# Patient Record
Sex: FEMALE | Race: WHITE | Employment: FULL TIME | URBAN - METROPOLITAN AREA
[De-identification: names, ages, dates, MRNs, and addresses within clinical notes are randomized per-mention and may not be internally consistent; named-entity substitution may affect disease eponyms.]

---

## 2017-02-16 ENCOUNTER — TELEPHONE (OUTPATIENT)
Dept: ADMINISTRATIVE | Age: 67
End: 2017-02-16

## 2017-02-16 DIAGNOSIS — R73.9 HYPERGLYCEMIA: ICD-10-CM

## 2017-02-16 DIAGNOSIS — E78.5 HYPERLIPIDEMIA, UNSPECIFIED HYPERLIPIDEMIA TYPE: ICD-10-CM

## 2017-02-16 DIAGNOSIS — I10 ESSENTIAL HYPERTENSION: Primary | ICD-10-CM

## 2017-02-16 RX ORDER — LISINOPRIL 40 MG/1
40 TABLET ORAL DAILY
Qty: 30 TABLET | Refills: 0 | Status: SHIPPED | OUTPATIENT
Start: 2017-02-16 | End: 2017-03-08 | Stop reason: SDUPTHER

## 2017-02-16 RX ORDER — HYDROCHLOROTHIAZIDE 12.5 MG/1
12.5 TABLET ORAL DAILY
Qty: 30 TABLET | Refills: 0 | Status: SHIPPED | OUTPATIENT
Start: 2017-02-16 | End: 2017-03-08 | Stop reason: SDUPTHER

## 2017-03-08 ENCOUNTER — OFFICE VISIT (OUTPATIENT)
Dept: FAMILY MEDICINE CLINIC | Age: 67
End: 2017-03-08

## 2017-03-08 VITALS
HEART RATE: 102 BPM | WEIGHT: 175 LBS | DIASTOLIC BLOOD PRESSURE: 78 MMHG | OXYGEN SATURATION: 97 % | BODY MASS INDEX: 28.12 KG/M2 | SYSTOLIC BLOOD PRESSURE: 104 MMHG | HEIGHT: 66 IN

## 2017-03-08 DIAGNOSIS — R73.9 HYPERGLYCEMIA: ICD-10-CM

## 2017-03-08 DIAGNOSIS — I10 ESSENTIAL HYPERTENSION: Primary | ICD-10-CM

## 2017-03-08 DIAGNOSIS — L57.0 AK (ACTINIC KERATOSIS): ICD-10-CM

## 2017-03-08 PROCEDURE — G0009 ADMIN PNEUMOCOCCAL VACCINE: HCPCS | Performed by: FAMILY MEDICINE

## 2017-03-08 PROCEDURE — 17003 DESTRUCT PREMALG LES 2-14: CPT | Performed by: FAMILY MEDICINE

## 2017-03-08 PROCEDURE — G8420 CALC BMI NORM PARAMETERS: HCPCS | Performed by: FAMILY MEDICINE

## 2017-03-08 PROCEDURE — G8399 PT W/DXA RESULTS DOCUMENT: HCPCS | Performed by: FAMILY MEDICINE

## 2017-03-08 PROCEDURE — 90732 PPSV23 VACC 2 YRS+ SUBQ/IM: CPT | Performed by: FAMILY MEDICINE

## 2017-03-08 PROCEDURE — G8484 FLU IMMUNIZE NO ADMIN: HCPCS | Performed by: FAMILY MEDICINE

## 2017-03-08 PROCEDURE — 3014F SCREEN MAMMO DOC REV: CPT | Performed by: FAMILY MEDICINE

## 2017-03-08 PROCEDURE — 1090F PRES/ABSN URINE INCON ASSESS: CPT | Performed by: FAMILY MEDICINE

## 2017-03-08 PROCEDURE — 3017F COLORECTAL CA SCREEN DOC REV: CPT | Performed by: FAMILY MEDICINE

## 2017-03-08 PROCEDURE — 1123F ACP DISCUSS/DSCN MKR DOCD: CPT | Performed by: FAMILY MEDICINE

## 2017-03-08 PROCEDURE — 17000 DESTRUCT PREMALG LESION: CPT | Performed by: FAMILY MEDICINE

## 2017-03-08 PROCEDURE — 99213 OFFICE O/P EST LOW 20 MIN: CPT | Performed by: FAMILY MEDICINE

## 2017-03-08 PROCEDURE — 4040F PNEUMOC VAC/ADMIN/RCVD: CPT | Performed by: FAMILY MEDICINE

## 2017-03-08 PROCEDURE — 1036F TOBACCO NON-USER: CPT | Performed by: FAMILY MEDICINE

## 2017-03-08 PROCEDURE — G8427 DOCREV CUR MEDS BY ELIG CLIN: HCPCS | Performed by: FAMILY MEDICINE

## 2017-03-08 RX ORDER — LISINOPRIL 40 MG/1
40 TABLET ORAL DAILY
Qty: 90 TABLET | Refills: 3 | Status: SHIPPED | OUTPATIENT
Start: 2017-03-08 | End: 2018-02-22 | Stop reason: SDUPTHER

## 2017-03-08 RX ORDER — HYDROCHLOROTHIAZIDE 12.5 MG/1
12.5 TABLET ORAL DAILY
Qty: 90 TABLET | Refills: 3 | Status: SHIPPED | OUTPATIENT
Start: 2017-03-08 | End: 2018-02-22 | Stop reason: SDUPTHER

## 2017-03-14 ENCOUNTER — OFFICE VISIT (OUTPATIENT)
Dept: FAMILY MEDICINE CLINIC | Age: 67
End: 2017-03-14

## 2017-03-14 VITALS
SYSTOLIC BLOOD PRESSURE: 110 MMHG | DIASTOLIC BLOOD PRESSURE: 82 MMHG | BODY MASS INDEX: 28.4 KG/M2 | HEART RATE: 82 BPM | TEMPERATURE: 97.2 F | WEIGHT: 174.6 LBS | OXYGEN SATURATION: 97 %

## 2017-03-14 DIAGNOSIS — J20.9 ACUTE BRONCHITIS, UNSPECIFIED ORGANISM: Primary | ICD-10-CM

## 2017-03-14 PROCEDURE — 4040F PNEUMOC VAC/ADMIN/RCVD: CPT | Performed by: FAMILY MEDICINE

## 2017-03-14 PROCEDURE — G8399 PT W/DXA RESULTS DOCUMENT: HCPCS | Performed by: FAMILY MEDICINE

## 2017-03-14 PROCEDURE — 1090F PRES/ABSN URINE INCON ASSESS: CPT | Performed by: FAMILY MEDICINE

## 2017-03-14 PROCEDURE — 1036F TOBACCO NON-USER: CPT | Performed by: FAMILY MEDICINE

## 2017-03-14 PROCEDURE — 99213 OFFICE O/P EST LOW 20 MIN: CPT | Performed by: FAMILY MEDICINE

## 2017-03-14 PROCEDURE — 3014F SCREEN MAMMO DOC REV: CPT | Performed by: FAMILY MEDICINE

## 2017-03-14 PROCEDURE — G8484 FLU IMMUNIZE NO ADMIN: HCPCS | Performed by: FAMILY MEDICINE

## 2017-03-14 PROCEDURE — G8420 CALC BMI NORM PARAMETERS: HCPCS | Performed by: FAMILY MEDICINE

## 2017-03-14 PROCEDURE — 1123F ACP DISCUSS/DSCN MKR DOCD: CPT | Performed by: FAMILY MEDICINE

## 2017-03-14 PROCEDURE — G8428 CUR MEDS NOT DOCUMENT: HCPCS | Performed by: FAMILY MEDICINE

## 2017-03-14 PROCEDURE — 3017F COLORECTAL CA SCREEN DOC REV: CPT | Performed by: FAMILY MEDICINE

## 2017-03-14 RX ORDER — CEFDINIR 300 MG/1
300 CAPSULE ORAL 2 TIMES DAILY
Qty: 20 CAPSULE | Refills: 0 | Status: SHIPPED | OUTPATIENT
Start: 2017-03-14 | End: 2017-03-24

## 2017-03-14 RX ORDER — BENZONATATE 200 MG/1
200 CAPSULE ORAL 3 TIMES DAILY PRN
Qty: 20 CAPSULE | Refills: 0 | Status: SHIPPED | OUTPATIENT
Start: 2017-03-14 | End: 2017-03-21

## 2017-03-14 ASSESSMENT — ENCOUNTER SYMPTOMS: COUGH: 1

## 2017-03-30 RX ORDER — HYDROCHLOROTHIAZIDE 12.5 MG/1
TABLET ORAL
Qty: 30 TABLET | Refills: 0 | OUTPATIENT
Start: 2017-03-30

## 2017-03-30 RX ORDER — LISINOPRIL 40 MG/1
TABLET ORAL
Qty: 30 TABLET | Refills: 0 | OUTPATIENT
Start: 2017-03-30

## 2017-05-02 ENCOUNTER — OFFICE VISIT (OUTPATIENT)
Dept: FAMILY MEDICINE CLINIC | Age: 67
End: 2017-05-02

## 2017-05-02 VITALS
DIASTOLIC BLOOD PRESSURE: 80 MMHG | WEIGHT: 177 LBS | BODY MASS INDEX: 28.79 KG/M2 | TEMPERATURE: 99.4 F | SYSTOLIC BLOOD PRESSURE: 118 MMHG

## 2017-05-02 DIAGNOSIS — R10.30 LOWER ABDOMINAL PAIN: Primary | ICD-10-CM

## 2017-05-02 LAB
BACTERIA URINE, POC: 0
BILIRUBIN URINE: 0 MG/DL
BLOOD, URINE: POSITIVE
CASTS URINE, POC: 0
CLARITY: CLEAR
COLOR: YELLOW
CRYSTALS URINE, POC: 0
EPI CELLS URINE, POC: 0
GLUCOSE URINE: 0
KETONES, URINE: NEGATIVE
LEUKOCYTE EST, POC: NORMAL
NITRITE, URINE: NEGATIVE
PH UA: 5.5 (ref 4.5–8)
PROTEIN UA: NEGATIVE
RBC URINE, POC: 0
SPECIFIC GRAVITY UA: 1.02 (ref 1–1.03)
UROBILINOGEN, URINE: NORMAL
WBC URINE, POC: NORMAL
YEAST URINE, POC: 0

## 2017-05-02 PROCEDURE — G8427 DOCREV CUR MEDS BY ELIG CLIN: HCPCS | Performed by: PHYSICIAN ASSISTANT

## 2017-05-02 PROCEDURE — G8399 PT W/DXA RESULTS DOCUMENT: HCPCS | Performed by: PHYSICIAN ASSISTANT

## 2017-05-02 PROCEDURE — 1036F TOBACCO NON-USER: CPT | Performed by: PHYSICIAN ASSISTANT

## 2017-05-02 PROCEDURE — 99213 OFFICE O/P EST LOW 20 MIN: CPT | Performed by: PHYSICIAN ASSISTANT

## 2017-05-02 PROCEDURE — 1090F PRES/ABSN URINE INCON ASSESS: CPT | Performed by: PHYSICIAN ASSISTANT

## 2017-05-02 PROCEDURE — 3014F SCREEN MAMMO DOC REV: CPT | Performed by: PHYSICIAN ASSISTANT

## 2017-05-02 PROCEDURE — 4040F PNEUMOC VAC/ADMIN/RCVD: CPT | Performed by: PHYSICIAN ASSISTANT

## 2017-05-02 PROCEDURE — 81000 URINALYSIS NONAUTO W/SCOPE: CPT | Performed by: PHYSICIAN ASSISTANT

## 2017-05-02 PROCEDURE — 1123F ACP DISCUSS/DSCN MKR DOCD: CPT | Performed by: PHYSICIAN ASSISTANT

## 2017-05-02 PROCEDURE — 3017F COLORECTAL CA SCREEN DOC REV: CPT | Performed by: PHYSICIAN ASSISTANT

## 2017-05-02 PROCEDURE — G8420 CALC BMI NORM PARAMETERS: HCPCS | Performed by: PHYSICIAN ASSISTANT

## 2017-05-02 RX ORDER — CIPROFLOXACIN 500 MG/1
500 TABLET, FILM COATED ORAL 2 TIMES DAILY
Qty: 10 TABLET | Refills: 0 | Status: SHIPPED | OUTPATIENT
Start: 2017-05-02 | End: 2017-05-05 | Stop reason: SDUPTHER

## 2017-05-02 ASSESSMENT — ENCOUNTER SYMPTOMS
BACK PAIN: 0
CONSTIPATION: 0
NAUSEA: 0
VOMITING: 0
SHORTNESS OF BREATH: 0
DIARRHEA: 0
ABDOMINAL PAIN: 1

## 2017-05-05 DIAGNOSIS — R10.30 LOWER ABDOMINAL PAIN: ICD-10-CM

## 2017-05-05 LAB
ORGANISM: ABNORMAL
URINE CULTURE, ROUTINE: ABNORMAL

## 2017-05-05 RX ORDER — CIPROFLOXACIN 500 MG/1
500 TABLET, FILM COATED ORAL 2 TIMES DAILY
Qty: 10 TABLET | Refills: 0 | Status: SHIPPED | OUTPATIENT
Start: 2017-05-05 | End: 2017-05-10

## 2017-05-09 ENCOUNTER — TELEPHONE (OUTPATIENT)
Dept: FAMILY MEDICINE CLINIC | Age: 67
End: 2017-05-09

## 2017-05-11 ENCOUNTER — TELEPHONE (OUTPATIENT)
Dept: FAMILY MEDICINE CLINIC | Age: 67
End: 2017-05-11

## 2017-05-11 DIAGNOSIS — Z13.9 SCREENING: ICD-10-CM

## 2017-05-11 DIAGNOSIS — R10.30 LOWER ABDOMINAL PAIN: ICD-10-CM

## 2017-05-11 DIAGNOSIS — R50.81 FEVER IN OTHER DISEASES: Primary | ICD-10-CM

## 2017-05-11 DIAGNOSIS — R19.7 DIARRHEA, UNSPECIFIED TYPE: ICD-10-CM

## 2017-05-12 ENCOUNTER — HOSPITAL ENCOUNTER (OUTPATIENT)
Dept: CT IMAGING | Age: 67
Discharge: HOME | End: 2017-05-12
Attending: PHYSICIAN ASSISTANT | Admitting: PHYSICIAN ASSISTANT

## 2017-05-12 DIAGNOSIS — R19.7 DIARRHEA, UNSPECIFIED TYPE: ICD-10-CM

## 2017-05-12 DIAGNOSIS — R50.9 FEVER: ICD-10-CM

## 2017-05-12 DIAGNOSIS — R10.30 LOWER ABDOMINAL PAIN: ICD-10-CM

## 2017-05-12 DIAGNOSIS — R50.81 FEVER IN OTHER DISEASES: ICD-10-CM

## 2017-05-12 LAB
ANION GAP SERPL CALCULATED.3IONS-SCNC: 14 MMOL/L (ref 3–16)
BUN BLDV-MCNC: 22 MG/DL (ref 7–20)
CALCIUM SERPL-MCNC: 9.4 MG/DL (ref 8.3–10.6)
CHLORIDE BLD-SCNC: 100 MMOL/L (ref 99–110)
CO2: 25 MMOL/L (ref 21–32)
CREAT SERPL-MCNC: 1.2 MG/DL (ref 0.6–1.2)
GFR AFRICAN AMERICAN: 54
GFR NON-AFRICAN AMERICAN: 45
GLUCOSE BLD-MCNC: 104 MG/DL (ref 70–99)
POTASSIUM SERPL-SCNC: 4.4 MMOL/L (ref 3.5–5.1)
SODIUM BLD-SCNC: 139 MMOL/L (ref 136–145)

## 2017-05-22 ENCOUNTER — OFFICE VISIT (OUTPATIENT)
Dept: FAMILY MEDICINE CLINIC | Age: 67
End: 2017-05-22

## 2017-05-22 VITALS
HEART RATE: 93 BPM | WEIGHT: 172 LBS | DIASTOLIC BLOOD PRESSURE: 70 MMHG | SYSTOLIC BLOOD PRESSURE: 120 MMHG | BODY MASS INDEX: 27.76 KG/M2 | OXYGEN SATURATION: 99 %

## 2017-05-22 DIAGNOSIS — K57.20 DIVERTICULITIS OF LARGE INTESTINE WITH ABSCESS WITHOUT BLEEDING: Primary | ICD-10-CM

## 2017-05-22 DIAGNOSIS — L20.84 INTRINSIC ECZEMA: ICD-10-CM

## 2017-05-22 DIAGNOSIS — E78.5 HYPERLIPIDEMIA, UNSPECIFIED HYPERLIPIDEMIA TYPE: Primary | ICD-10-CM

## 2017-05-22 DIAGNOSIS — B37.31 MONILIAL VAGINITIS: ICD-10-CM

## 2017-05-22 DIAGNOSIS — E03.9 HYPOTHYROIDISM, UNSPECIFIED TYPE: ICD-10-CM

## 2017-05-22 DIAGNOSIS — I10 ESSENTIAL HYPERTENSION: ICD-10-CM

## 2017-05-22 PROCEDURE — 99213 OFFICE O/P EST LOW 20 MIN: CPT | Performed by: FAMILY MEDICINE

## 2017-05-22 RX ORDER — FLUCONAZOLE 150 MG/1
150 TABLET ORAL ONCE
Qty: 1 TABLET | Refills: 0 | Status: SHIPPED | OUTPATIENT
Start: 2017-05-22 | End: 2017-05-22

## 2017-06-22 ENCOUNTER — HOSPITAL ENCOUNTER (OUTPATIENT)
Dept: CT IMAGING | Age: 67
Discharge: HOME | End: 2017-06-22
Attending: FAMILY MEDICINE | Admitting: FAMILY MEDICINE

## 2017-06-22 DIAGNOSIS — K57.20 DIVERTICULITIS OF LARGE INTESTINE WITH ABSCESS WITHOUT BLEEDING: ICD-10-CM

## 2017-06-22 DIAGNOSIS — K57.20 DIVERTICULITIS OF LARGE INTESTINE WITH PERFORATION AND ABSCESS WITHOUT BLEEDING: ICD-10-CM

## 2017-06-26 ENCOUNTER — NURSE ONLY (OUTPATIENT)
Dept: FAMILY MEDICINE CLINIC | Age: 67
End: 2017-06-26

## 2017-06-26 DIAGNOSIS — N39.0 URINARY TRACT INFECTION WITHOUT HEMATURIA, SITE UNSPECIFIED: Primary | ICD-10-CM

## 2017-06-26 LAB
BACTERIA URINE, POC: NEGATIVE
BILIRUBIN URINE: 0 MG/DL
BLOOD, URINE: NEGATIVE
CASTS URINE, POC: NEGATIVE
CLARITY: CLEAR
COLOR: YELLOW
CRYSTALS URINE, POC: POSITIVE
EPI CELLS URINE, POC: NEGATIVE
GLUCOSE URINE: NEGATIVE
KETONES, URINE: NEGATIVE
LEUKOCYTE EST, POC: NEGATIVE
NITRITE, URINE: NEGATIVE
PH UA: 5 (ref 4.5–8)
PROTEIN UA: NEGATIVE
RBC URINE, POC: NEGATIVE
SPECIFIC GRAVITY UA: 1.02 (ref 1–1.03)
UROBILINOGEN, URINE: NORMAL
WBC URINE, POC: NEGATIVE
YEAST URINE, POC: NEGATIVE

## 2017-06-26 PROCEDURE — 81000 URINALYSIS NONAUTO W/SCOPE: CPT | Performed by: FAMILY MEDICINE

## 2017-09-11 ENCOUNTER — OFFICE VISIT (OUTPATIENT)
Dept: FAMILY MEDICINE CLINIC | Age: 67
End: 2017-09-11

## 2017-09-11 VITALS
SYSTOLIC BLOOD PRESSURE: 110 MMHG | DIASTOLIC BLOOD PRESSURE: 80 MMHG | WEIGHT: 173 LBS | BODY MASS INDEX: 27.92 KG/M2 | TEMPERATURE: 98 F

## 2017-09-11 DIAGNOSIS — L30.9 ECZEMA, UNSPECIFIED TYPE: Primary | ICD-10-CM

## 2017-09-11 PROCEDURE — 3017F COLORECTAL CA SCREEN DOC REV: CPT | Performed by: PHYSICIAN ASSISTANT

## 2017-09-11 PROCEDURE — 1090F PRES/ABSN URINE INCON ASSESS: CPT | Performed by: PHYSICIAN ASSISTANT

## 2017-09-11 PROCEDURE — 1036F TOBACCO NON-USER: CPT | Performed by: PHYSICIAN ASSISTANT

## 2017-09-11 PROCEDURE — G8419 CALC BMI OUT NRM PARAM NOF/U: HCPCS | Performed by: PHYSICIAN ASSISTANT

## 2017-09-11 PROCEDURE — 1123F ACP DISCUSS/DSCN MKR DOCD: CPT | Performed by: PHYSICIAN ASSISTANT

## 2017-09-11 PROCEDURE — 4040F PNEUMOC VAC/ADMIN/RCVD: CPT | Performed by: PHYSICIAN ASSISTANT

## 2017-09-11 PROCEDURE — G8399 PT W/DXA RESULTS DOCUMENT: HCPCS | Performed by: PHYSICIAN ASSISTANT

## 2017-09-11 PROCEDURE — G8427 DOCREV CUR MEDS BY ELIG CLIN: HCPCS | Performed by: PHYSICIAN ASSISTANT

## 2017-09-11 PROCEDURE — 99213 OFFICE O/P EST LOW 20 MIN: CPT | Performed by: PHYSICIAN ASSISTANT

## 2017-09-11 PROCEDURE — 3014F SCREEN MAMMO DOC REV: CPT | Performed by: PHYSICIAN ASSISTANT

## 2017-09-11 RX ORDER — PREDNISONE 10 MG/1
TABLET ORAL
Qty: 45 TABLET | Refills: 0 | Status: SHIPPED | OUTPATIENT
Start: 2017-09-11 | End: 2017-10-09

## 2017-09-11 ASSESSMENT — ENCOUNTER SYMPTOMS
TROUBLE SWALLOWING: 0
SORE THROAT: 0
WHEEZING: 0
SHORTNESS OF BREATH: 0

## 2017-09-13 ENCOUNTER — TELEPHONE (OUTPATIENT)
Dept: FAMILY MEDICINE CLINIC | Age: 67
End: 2017-09-13

## 2017-10-09 ENCOUNTER — OFFICE VISIT (OUTPATIENT)
Dept: FAMILY MEDICINE CLINIC | Age: 67
End: 2017-10-09

## 2017-10-09 VITALS
TEMPERATURE: 97.9 F | WEIGHT: 176 LBS | DIASTOLIC BLOOD PRESSURE: 80 MMHG | BODY MASS INDEX: 28.41 KG/M2 | SYSTOLIC BLOOD PRESSURE: 110 MMHG

## 2017-10-09 DIAGNOSIS — R31.9 URINARY TRACT INFECTION WITH HEMATURIA, SITE UNSPECIFIED: Primary | ICD-10-CM

## 2017-10-09 DIAGNOSIS — R10.32 LLQ ABDOMINAL PAIN: ICD-10-CM

## 2017-10-09 DIAGNOSIS — N39.0 URINARY TRACT INFECTION WITH HEMATURIA, SITE UNSPECIFIED: Primary | ICD-10-CM

## 2017-10-09 LAB
BACTERIA URINE, POC: 0
BILIRUBIN URINE: 0 MG/DL
BLOOD, URINE: POSITIVE
CASTS URINE, POC: 0
CLARITY: CLEAR
COLOR: YELLOW
CRYSTALS URINE, POC: 0
EPI CELLS URINE, POC: 0
GLUCOSE URINE: 0
KETONES, URINE: NEGATIVE
LEUKOCYTE EST, POC: ABNORMAL
NITRITE, URINE: NEGATIVE
PH UA: 5 (ref 4.5–8)
PROTEIN UA: ABNORMAL
RBC URINE, POC: 0
SPECIFIC GRAVITY UA: 1.02 (ref 1–1.03)
UROBILINOGEN, URINE: NORMAL
WBC URINE, POC: ABNORMAL
YEAST URINE, POC: 0

## 2017-10-09 PROCEDURE — G8427 DOCREV CUR MEDS BY ELIG CLIN: HCPCS | Performed by: PHYSICIAN ASSISTANT

## 2017-10-09 PROCEDURE — 3014F SCREEN MAMMO DOC REV: CPT | Performed by: PHYSICIAN ASSISTANT

## 2017-10-09 PROCEDURE — 1036F TOBACCO NON-USER: CPT | Performed by: PHYSICIAN ASSISTANT

## 2017-10-09 PROCEDURE — 4040F PNEUMOC VAC/ADMIN/RCVD: CPT | Performed by: PHYSICIAN ASSISTANT

## 2017-10-09 PROCEDURE — 1090F PRES/ABSN URINE INCON ASSESS: CPT | Performed by: PHYSICIAN ASSISTANT

## 2017-10-09 PROCEDURE — 3017F COLORECTAL CA SCREEN DOC REV: CPT | Performed by: PHYSICIAN ASSISTANT

## 2017-10-09 PROCEDURE — G8417 CALC BMI ABV UP PARAM F/U: HCPCS | Performed by: PHYSICIAN ASSISTANT

## 2017-10-09 PROCEDURE — 99213 OFFICE O/P EST LOW 20 MIN: CPT | Performed by: PHYSICIAN ASSISTANT

## 2017-10-09 PROCEDURE — 81000 URINALYSIS NONAUTO W/SCOPE: CPT | Performed by: PHYSICIAN ASSISTANT

## 2017-10-09 PROCEDURE — G8399 PT W/DXA RESULTS DOCUMENT: HCPCS | Performed by: PHYSICIAN ASSISTANT

## 2017-10-09 PROCEDURE — G8484 FLU IMMUNIZE NO ADMIN: HCPCS | Performed by: PHYSICIAN ASSISTANT

## 2017-10-09 PROCEDURE — 1123F ACP DISCUSS/DSCN MKR DOCD: CPT | Performed by: PHYSICIAN ASSISTANT

## 2017-10-09 RX ORDER — METRONIDAZOLE 500 MG/1
500 TABLET ORAL 3 TIMES DAILY
Qty: 30 TABLET | Refills: 0 | Status: SHIPPED | OUTPATIENT
Start: 2017-10-09 | End: 2017-10-19

## 2017-10-09 RX ORDER — CIPROFLOXACIN 500 MG/1
500 TABLET, FILM COATED ORAL 2 TIMES DAILY
Qty: 20 TABLET | Refills: 0 | Status: SHIPPED | OUTPATIENT
Start: 2017-10-09 | End: 2017-10-19

## 2017-10-09 ASSESSMENT — ENCOUNTER SYMPTOMS
DIARRHEA: 0
VOMITING: 0
CONSTIPATION: 0
ABDOMINAL PAIN: 1
BACK PAIN: 0
SHORTNESS OF BREATH: 0
NAUSEA: 0

## 2017-10-09 NOTE — PROGRESS NOTES
Subjective:      Patient ID: Miller Chandler is a 75 y.o. female. HPI  Patient is here today with a 1 week history of lower abdominal pain and urine odor. She is not sure if it is a UTI or her diverticulitis flaring up. She has had a low grade fever intermittently. She has had no n/v/d. No black or bloody stools. She had diverticulitis back in May and had and abscess, it did resolve on a repeat ct scan. Review of Systems   Constitutional: Negative for appetite change, chills and fever. Respiratory: Negative for shortness of breath. Cardiovascular: Negative for chest pain. Gastrointestinal: Positive for abdominal pain. Negative for constipation, diarrhea, nausea and vomiting. Genitourinary: Negative for difficulty urinating, dysuria, frequency, hematuria and urgency. Urine odor   Musculoskeletal: Negative for back pain. Objective:   Physical Exam   Constitutional: She is oriented to person, place, and time. Vital signs are normal. She appears well-developed and well-nourished. She is cooperative. Cardiovascular: Normal rate, regular rhythm and normal heart sounds. Pulmonary/Chest: Effort normal and breath sounds normal. No respiratory distress. She has no decreased breath sounds. Abdominal: Soft. Normal appearance and bowel sounds are normal. She exhibits no distension and no mass. There is no hepatosplenomegaly. There is tenderness in the suprapubic area and left lower quadrant. There is no rigidity, no rebound, no guarding and no CVA tenderness. No hernia. Neurological: She is alert and oriented to person, place, and time. Assessment:      Sadi Foss was seen today for urinary tract infection. Diagnoses and all orders for this visit:    Urinary tract infection with hematuria, site unspecified  -     POC URINE with Microscopic  -     URINE CULTURE  -     ciprofloxacin (CIPRO) 500 MG tablet;  Take 1 tablet by mouth 2 times daily for 10 days    LLQ abdominal pain  -

## 2017-10-11 LAB — URINE CULTURE, ROUTINE: NORMAL

## 2017-10-16 ENCOUNTER — TELEPHONE (OUTPATIENT)
Dept: FAMILY MEDICINE CLINIC | Age: 67
End: 2017-10-16

## 2017-11-20 ENCOUNTER — TELEPHONE (OUTPATIENT)
Dept: FAMILY MEDICINE CLINIC | Age: 67
End: 2017-11-20

## 2017-11-20 RX ORDER — PREDNISONE 10 MG/1
TABLET ORAL
Qty: 21 TABLET | Refills: 0 | Status: SHIPPED | OUTPATIENT
Start: 2017-11-20 | End: 2018-01-15 | Stop reason: SDUPTHER

## 2018-01-15 ENCOUNTER — TELEPHONE (OUTPATIENT)
Dept: FAMILY MEDICINE CLINIC | Age: 68
End: 2018-01-15

## 2018-01-16 RX ORDER — PREDNISONE 10 MG/1
TABLET ORAL
Qty: 21 TABLET | Refills: 0 | Status: SHIPPED | OUTPATIENT
Start: 2018-01-16 | End: 2018-08-27

## 2018-02-14 ENCOUNTER — TELEPHONE (OUTPATIENT)
Dept: DERMATOLOGY | Age: 68
End: 2018-02-14

## 2018-02-22 RX ORDER — LISINOPRIL 40 MG/1
40 TABLET ORAL DAILY
Qty: 90 TABLET | Refills: 0 | Status: SHIPPED | OUTPATIENT
Start: 2018-02-22 | End: 2018-04-17 | Stop reason: SDUPTHER

## 2018-02-22 RX ORDER — HYDROCHLOROTHIAZIDE 12.5 MG/1
12.5 TABLET ORAL DAILY
Qty: 90 TABLET | Refills: 0 | Status: SHIPPED | OUTPATIENT
Start: 2018-02-22 | End: 2018-04-17 | Stop reason: SDUPTHER

## 2018-03-06 ENCOUNTER — OFFICE VISIT (OUTPATIENT)
Dept: DERMATOLOGY | Age: 68
End: 2018-03-06

## 2018-03-06 DIAGNOSIS — D22.9 MULTIPLE NEVI: Primary | ICD-10-CM

## 2018-03-06 DIAGNOSIS — R21 RASH: ICD-10-CM

## 2018-03-06 DIAGNOSIS — L82.1 SEBORRHEIC KERATOSIS: ICD-10-CM

## 2018-03-06 PROCEDURE — 11100 PR BIOPSY OF SKIN LESION: CPT | Performed by: DERMATOLOGY

## 2018-03-06 PROCEDURE — G8427 DOCREV CUR MEDS BY ELIG CLIN: HCPCS | Performed by: DERMATOLOGY

## 2018-03-06 PROCEDURE — 3014F SCREEN MAMMO DOC REV: CPT | Performed by: DERMATOLOGY

## 2018-03-06 PROCEDURE — G8484 FLU IMMUNIZE NO ADMIN: HCPCS | Performed by: DERMATOLOGY

## 2018-03-06 PROCEDURE — 1123F ACP DISCUSS/DSCN MKR DOCD: CPT | Performed by: DERMATOLOGY

## 2018-03-06 PROCEDURE — 3017F COLORECTAL CA SCREEN DOC REV: CPT | Performed by: DERMATOLOGY

## 2018-03-06 PROCEDURE — 4040F PNEUMOC VAC/ADMIN/RCVD: CPT | Performed by: DERMATOLOGY

## 2018-03-06 PROCEDURE — G8399 PT W/DXA RESULTS DOCUMENT: HCPCS | Performed by: DERMATOLOGY

## 2018-03-06 PROCEDURE — 99203 OFFICE O/P NEW LOW 30 MIN: CPT | Performed by: DERMATOLOGY

## 2018-03-06 PROCEDURE — 1036F TOBACCO NON-USER: CPT | Performed by: DERMATOLOGY

## 2018-03-06 PROCEDURE — 1090F PRES/ABSN URINE INCON ASSESS: CPT | Performed by: DERMATOLOGY

## 2018-03-06 PROCEDURE — G8417 CALC BMI ABV UP PARAM F/U: HCPCS | Performed by: DERMATOLOGY

## 2018-03-06 NOTE — PROGRESS NOTES
veins     Family history of malignant neoplasm of gastrointestinal tract     Hypertension      Past Surgical History:   Procedure Laterality Date    FINGER AMPUTATION      left middle    HYSTERECTOMY      OTHER SURGICAL HISTORY      uterine prolapse       Physical Examination     Gen, well-appearing  The following were examined and determined to be normal: Psych/Neuro, Scalp/hair, Gums/teeth/lips, Neck, Breast/axilla/chest, Abdomen, Back, RUE, LUE, RLE, LLE, Nails/digits and buttocks. The following were examined and determined to be abnormal: Head/face and Conjunctivae/eyelids. Cheeks with well-defined erythematous warm patches with minimal scale (has aquaphor on)  FH and eyelids with ill-defined erythematous patches  Scalp, hands/periungal area normal    trunk and extremities with scattered brown macules and papules   R medial canthal area clear    Has photos of more scaly erythematous edematous patches on the face                Assessment and Plan     1. Benign-appearing nevi, SK's  2. Hx of NMSC, no signs recurrence  - educ re ABCD's of MM   educ sun protection   encouraged skin check yearly (sooner if indicated), self checks    3. R/o dermatitis vs lupus/DM  - Punch biopsy performed after verbal consent obtained. Patient educated regarding risk of bleeding, infection, scar and educated on wound care. Skin cleansed with alcohol pad and site anesthetized with lido + epi. Aluminum chloride applied to site for hemostasis if necessary and sutures placed. Petrolatum ointment and bandage applied. Specimen bottle labeled with patient information and site and specimen sent to [facility].    - TAC oint for now bid to affected areas; ed s/emisuse and risk cataracts/glaucoma with prolonged use around the eyes

## 2018-03-06 NOTE — PATIENT INSTRUCTIONS

## 2018-03-13 ENCOUNTER — NURSE ONLY (OUTPATIENT)
Dept: DERMATOLOGY | Age: 68
End: 2018-03-13

## 2018-03-13 DIAGNOSIS — Z48.02 VISIT FOR SUTURE REMOVAL: Primary | ICD-10-CM

## 2018-03-13 PROCEDURE — 99024 POSTOP FOLLOW-UP VISIT: CPT | Performed by: DERMATOLOGY

## 2018-03-20 ENCOUNTER — TELEPHONE (OUTPATIENT)
Dept: DERMATOLOGY | Age: 68
End: 2018-03-20

## 2018-03-20 NOTE — TELEPHONE ENCOUNTER
Pt calling to get test results from her biopsy with some questions pls call pt back to discuss @ [phone] thank you

## 2018-04-17 RX ORDER — HYDROCHLOROTHIAZIDE 12.5 MG/1
12.5 TABLET ORAL DAILY
Qty: 90 TABLET | Refills: 0 | Status: SHIPPED | OUTPATIENT
Start: 2018-04-17 | End: 2018-06-05 | Stop reason: SDUPTHER

## 2018-04-17 RX ORDER — LISINOPRIL 40 MG/1
40 TABLET ORAL DAILY
Qty: 90 TABLET | Refills: 0 | Status: SHIPPED | OUTPATIENT
Start: 2018-04-17 | End: 2018-06-05 | Stop reason: SDUPTHER

## 2018-06-05 RX ORDER — HYDROCHLOROTHIAZIDE 12.5 MG/1
12.5 TABLET ORAL DAILY
Qty: 90 TABLET | Refills: 0 | Status: SHIPPED | OUTPATIENT
Start: 2018-06-05 | End: 2018-07-25 | Stop reason: SDUPTHER

## 2018-06-05 RX ORDER — LISINOPRIL 40 MG/1
40 TABLET ORAL DAILY
Qty: 90 TABLET | Refills: 0 | Status: SHIPPED | OUTPATIENT
Start: 2018-06-05 | End: 2018-07-25 | Stop reason: SDUPTHER

## 2018-07-25 ENCOUNTER — TELEPHONE (OUTPATIENT)
Dept: FAMILY MEDICINE CLINIC | Age: 68
End: 2018-07-25

## 2018-07-25 RX ORDER — LISINOPRIL 40 MG/1
40 TABLET ORAL DAILY
Qty: 30 TABLET | Refills: 0 | Status: SHIPPED | OUTPATIENT
Start: 2018-07-25 | End: 2018-08-21 | Stop reason: SDUPTHER

## 2018-07-25 RX ORDER — HYDROCHLOROTHIAZIDE 12.5 MG/1
12.5 TABLET ORAL DAILY
Qty: 30 TABLET | Refills: 0 | Status: SHIPPED | OUTPATIENT
Start: 2018-07-25 | End: 2018-08-21 | Stop reason: SDUPTHER

## 2018-07-25 NOTE — TELEPHONE ENCOUNTER
lisinopril (PRINIVIL;ZESTRIL) 40 MG tablet 90 tablet 0 6/5/2018     Sig - Route: TAKE 1 TABLET BY MOUTH  DAILY - Oral      hydrochlorothiazide (HYDRODIURIL) 12.5 MG tablet 90 tablet 0 6/5/2018     Sig - Route: TAKE 1 TABLET BY MOUTH  DAILY - Oral      [facility] in chart if giving 90 day supply    [facility] on state route if sending enough until appt    Pt has appt 8/27    Please call and advise pt which pharmacy we are sending to

## 2018-08-21 RX ORDER — LISINOPRIL 40 MG/1
TABLET ORAL
Qty: 30 TABLET | Refills: 1 | Status: SHIPPED | OUTPATIENT
Start: 2018-08-21 | End: 2018-08-27 | Stop reason: SDUPTHER

## 2018-08-21 RX ORDER — HYDROCHLOROTHIAZIDE 12.5 MG/1
TABLET ORAL
Qty: 30 TABLET | Refills: 1 | Status: SHIPPED | OUTPATIENT
Start: 2018-08-21 | End: 2018-08-27 | Stop reason: SDUPTHER

## 2018-08-27 ENCOUNTER — OFFICE VISIT (OUTPATIENT)
Dept: FAMILY MEDICINE CLINIC | Age: 68
End: 2018-08-27

## 2018-08-27 VITALS
OXYGEN SATURATION: 98 % | HEART RATE: 104 BPM | DIASTOLIC BLOOD PRESSURE: 78 MMHG | WEIGHT: 176 LBS | HEIGHT: 66 IN | SYSTOLIC BLOOD PRESSURE: 108 MMHG | BODY MASS INDEX: 28.28 KG/M2

## 2018-08-27 DIAGNOSIS — Z89.029: ICD-10-CM

## 2018-08-27 DIAGNOSIS — I83.93 VARICOSE VEINS OF BOTH LOWER EXTREMITIES: ICD-10-CM

## 2018-08-27 DIAGNOSIS — R73.9 HYPERGLYCEMIA: ICD-10-CM

## 2018-08-27 DIAGNOSIS — I10 ESSENTIAL HYPERTENSION: Primary | ICD-10-CM

## 2018-08-27 PROCEDURE — 3017F COLORECTAL CA SCREEN DOC REV: CPT | Performed by: FAMILY MEDICINE

## 2018-08-27 PROCEDURE — 1036F TOBACCO NON-USER: CPT | Performed by: FAMILY MEDICINE

## 2018-08-27 PROCEDURE — 1090F PRES/ABSN URINE INCON ASSESS: CPT | Performed by: FAMILY MEDICINE

## 2018-08-27 PROCEDURE — 1101F PT FALLS ASSESS-DOCD LE1/YR: CPT | Performed by: FAMILY MEDICINE

## 2018-08-27 PROCEDURE — G8427 DOCREV CUR MEDS BY ELIG CLIN: HCPCS | Performed by: FAMILY MEDICINE

## 2018-08-27 PROCEDURE — 4040F PNEUMOC VAC/ADMIN/RCVD: CPT | Performed by: FAMILY MEDICINE

## 2018-08-27 PROCEDURE — 1123F ACP DISCUSS/DSCN MKR DOCD: CPT | Performed by: FAMILY MEDICINE

## 2018-08-27 PROCEDURE — 99213 OFFICE O/P EST LOW 20 MIN: CPT | Performed by: FAMILY MEDICINE

## 2018-08-27 PROCEDURE — G8417 CALC BMI ABV UP PARAM F/U: HCPCS | Performed by: FAMILY MEDICINE

## 2018-08-27 PROCEDURE — G8399 PT W/DXA RESULTS DOCUMENT: HCPCS | Performed by: FAMILY MEDICINE

## 2018-08-27 RX ORDER — HYDROCHLOROTHIAZIDE 12.5 MG/1
12.5 TABLET ORAL DAILY
Qty: 90 TABLET | Refills: 3 | Status: SHIPPED | OUTPATIENT
Start: 2018-08-27 | End: 2018-10-31 | Stop reason: SINTOL

## 2018-08-27 RX ORDER — LISINOPRIL 40 MG/1
40 TABLET ORAL DAILY
Qty: 90 TABLET | Refills: 3 | Status: SHIPPED | OUTPATIENT
Start: 2018-08-27 | End: 2019-08-19 | Stop reason: SDUPTHER

## 2018-08-27 ASSESSMENT — PATIENT HEALTH QUESTIONNAIRE - PHQ9
2. FEELING DOWN, DEPRESSED OR HOPELESS: 0
1. LITTLE INTEREST OR PLEASURE IN DOING THINGS: 0
SUM OF ALL RESPONSES TO PHQ QUESTIONS 1-9: 0
SUM OF ALL RESPONSES TO PHQ QUESTIONS 1-9: 0
SUM OF ALL RESPONSES TO PHQ9 QUESTIONS 1 & 2: 0

## 2018-08-27 ASSESSMENT — ENCOUNTER SYMPTOMS
COUGH: 0
SHORTNESS OF BREATH: 0
ABDOMINAL PAIN: 0
BACK PAIN: 0

## 2018-08-27 NOTE — PROGRESS NOTES
Subjective:      Patient ID: Karri Sosa is a 75 y.o. female. HPI   PATIENT HERE FOR F/U OF HYPERTENSION, also has varicose veins    Patient has a strong family history of colon cancer but is getting colonoscopies on a regular basis. She has varicose veins in her lower extremities as does her brother. Patient denies any pain or bleeding. Place tennis couple times a week. She is a nonsmoker  She has had a previous distal finger amputation in the past and has had no issues with this. Patient has a history of hypertension  Current Outpatient Prescriptions on File Prior to Visit   Medication Sig Dispense Refill    triamcinolone (KENALOG) 0.1 % ointment Apply to affected area BID PRN flares 30 g 1     No current facility-administered medications on file prior to visit. Review of Systems   Constitutional: Negative for fatigue and unexpected weight change. HENT: Negative for hearing loss and nosebleeds. Eyes: Negative for visual disturbance. Respiratory: Negative for cough and shortness of breath. Cardiovascular: Negative for chest pain, palpitations and leg swelling. Gastrointestinal: Negative for abdominal pain. Musculoskeletal: Negative for back pain. Neurological: Negative for dizziness and headaches.        Patient Active Problem List   Diagnosis    Essential hypertension    Varicose veins    Family history of malignant neoplasm of gastrointestinal tract    Asymptomatic varicose veins    Hyperlipidemia    Accessory skin tags    Hyperglycemia    Sigmoid diverticulitis    Intrinsic eczema       Outpatient Prescriptions Marked as Taking for the 8/27/18 encounter (Office Visit) with Radha Painter MD   Medication Sig Dispense Refill    Calcium Carbonate-Vitamin D (CALCIUM-D) 600-400 MG-UNIT TABS Take by mouth 2 times daily      zoster recombinant adjuvanted vaccine (SHINGRIX) 50 MCG SUSR injection Inject 0.5 mLs into the muscle once for 1 dose 0.5 mL 1    Sarah Russell MD   3. Hyperglycemia  HEMOGLOBIN A1C   4. History of amputation of finger, unspecified laterality         Plan:     Patient's hypertension is controlled. , She has had a mildly elevated blood sugar in the past. I suggested with her large varicose veins and some evidence of venous insufficiency that she get a consult from a vascular surgeon. He worked get a flu vaccine this fall. Suggested that she have the and basic metabolic panel performed. Lipids normal the past. She is getting her mammograms at least every 2 years. In colonoscopies are up-to-date.

## 2018-09-04 ENCOUNTER — OFFICE VISIT (OUTPATIENT)
Dept: FAMILY MEDICINE CLINIC | Age: 68
End: 2018-09-04

## 2018-09-04 VITALS — WEIGHT: 177 LBS | HEIGHT: 66 IN | OXYGEN SATURATION: 98 % | HEART RATE: 96 BPM | BODY MASS INDEX: 28.45 KG/M2

## 2018-09-04 DIAGNOSIS — I80.01 THROMBOPHLEBITIS OF SUPERFICIAL VEINS OF RIGHT LOWER EXTREMITY: Primary | ICD-10-CM

## 2018-09-04 DIAGNOSIS — I83.93 VARICOSE VEINS OF BOTH LOWER EXTREMITIES: ICD-10-CM

## 2018-09-04 PROCEDURE — 4040F PNEUMOC VAC/ADMIN/RCVD: CPT | Performed by: FAMILY MEDICINE

## 2018-09-04 PROCEDURE — G8399 PT W/DXA RESULTS DOCUMENT: HCPCS | Performed by: FAMILY MEDICINE

## 2018-09-04 PROCEDURE — G8417 CALC BMI ABV UP PARAM F/U: HCPCS | Performed by: FAMILY MEDICINE

## 2018-09-04 PROCEDURE — 99213 OFFICE O/P EST LOW 20 MIN: CPT | Performed by: FAMILY MEDICINE

## 2018-09-04 PROCEDURE — 1101F PT FALLS ASSESS-DOCD LE1/YR: CPT | Performed by: FAMILY MEDICINE

## 2018-09-04 PROCEDURE — G8427 DOCREV CUR MEDS BY ELIG CLIN: HCPCS | Performed by: FAMILY MEDICINE

## 2018-09-04 PROCEDURE — 3017F COLORECTAL CA SCREEN DOC REV: CPT | Performed by: FAMILY MEDICINE

## 2018-09-04 PROCEDURE — 1036F TOBACCO NON-USER: CPT | Performed by: FAMILY MEDICINE

## 2018-09-04 PROCEDURE — 1123F ACP DISCUSS/DSCN MKR DOCD: CPT | Performed by: FAMILY MEDICINE

## 2018-09-04 PROCEDURE — 1090F PRES/ABSN URINE INCON ASSESS: CPT | Performed by: FAMILY MEDICINE

## 2018-09-04 RX ORDER — RANITIDINE 150 MG/1
150 TABLET ORAL 2 TIMES DAILY
Qty: 60 TABLET | Refills: 3 | Status: ON HOLD | OUTPATIENT
Start: 2018-09-04 | End: 2018-10-15

## 2018-09-04 RX ORDER — CEFDINIR 300 MG/1
300 CAPSULE ORAL 2 TIMES DAILY
Qty: 20 CAPSULE | Refills: 0 | Status: SHIPPED | OUTPATIENT
Start: 2018-09-04 | End: 2018-09-14

## 2018-09-04 RX ORDER — NAPROXEN 250 MG/1
250 TABLET ORAL 2 TIMES DAILY WITH MEALS
Qty: 60 TABLET | Refills: 3 | Status: SHIPPED | OUTPATIENT
Start: 2018-09-04 | End: 2018-10-15

## 2018-09-04 NOTE — PROGRESS NOTES
SUBJECTIVE:    Brandon Nelson is a 75 y.o. female who presents for a follow up visit. Chief Complaint   Patient presents with    Leg Swelling     patient here today for leg swelling, was in office on 8/27/18, referred for vericose veins and swelling, was advised to come in office today to have them looked at         Leg Pain    The incident occurred 12 to 24 hours ago. The incident occurred at home. There was no injury mechanism. The pain is present in the right leg. The quality of the pain is described as burning. The pain is moderate. The pain has been constant since onset. Pertinent negatives include no inability to bear weight, loss of motion, loss of sensation, numbness or tingling. The symptoms are aggravated by palpation and weight bearing. She has tried NSAIDs for the symptoms. The treatment provided no relief. Patient was last seen on 8/27/18. At that time large varicose veins were noted in both lower extremities right greater than left. She was referred for further evaluation and treatment. She has not been to that appointment yet. She was concerned that she may have a clot. Patient's medications, allergies, past medical, surgical, social and family histories were reviewed and updated as appropriate. Past Medical History:   Diagnosis Date    Asymptomatic varicose veins     Family history of malignant neoplasm of gastrointestinal tract     Hypertension      Past Surgical History:   Procedure Laterality Date    FINGER AMPUTATION      left middle    HYSTERECTOMY      OTHER SURGICAL HISTORY      uterine prolapse     Family History   Problem Relation Age of Onset    Cancer Mother         lung.  colon, skin    High Blood Pressure Father         cerebral aneurysm    Cancer Maternal Grandmother     Cancer Maternal Grandfather     High Cholesterol Paternal Grandmother     Stroke Paternal Grandmother     High Cholesterol Paternal Zenaida Dinning Stroke Paternal Grandfather Social History     Social History    Marital status:      Spouse name: N/A    Number of children: N/A    Years of education: N/A     Occupational History    Not on file. Social History Main Topics    Smoking status: Never Smoker    Smokeless tobacco: Never Used    Alcohol use 0.0 oz/week      Comment: occ    Drug use: No    Sexual activity: Yes     Partners: Male     Other Topics Concern    Not on file     Social History Narrative    No narrative on file     Allergies   Allergen Reactions    Bactrim Rash     Current Outpatient Prescriptions   Medication Sig Dispense Refill    naproxen (NAPROSYN) 250 MG tablet Take 1 tablet by mouth 2 times daily (with meals) 60 tablet 3    cefdinir (OMNICEF) 300 MG capsule Take 1 capsule by mouth 2 times daily for 10 days 20 capsule 0    ranitidine (ZANTAC) 150 MG tablet Take 1 tablet by mouth 2 times daily 60 tablet 3    Compression Stockings MISC by Does not apply route 1 each 0    Calcium Carbonate-Vitamin D (CALCIUM-D) 600-400 MG-UNIT TABS Take by mouth 2 times daily      hydrochlorothiazide (HYDRODIURIL) 12.5 MG tablet Take 1 tablet by mouth daily 90 tablet 3    lisinopril (PRINIVIL;ZESTRIL) 40 MG tablet Take 1 tablet by mouth daily 90 tablet 3    triamcinolone (KENALOG) 0.1 % ointment Apply to affected area BID PRN flares 30 g 1     No current facility-administered medications for this visit. Review of Systems   Constitutional: Negative. HENT: Negative. Cardiovascular: Positive for leg swelling (with associated varicose veins. ). Genitourinary: Negative. Musculoskeletal: Positive for myalgias. Neurological: Negative. Negative for tingling and numbness. Psychiatric/Behavioral: Negative. OBJECTIVE:    Pulse 96   Ht 5' 5.75\" (1.67 m)   Wt 177 lb (80.3 kg)   SpO2 98%   BMI 28.79 kg/m²    Physical Exam   Constitutional: She is oriented to person, place, and time. She appears well-developed and well-nourished. HENT:   Head: Normocephalic and atraumatic. Right Ear: External ear normal.   Left Ear: External ear normal.   Mouth/Throat: Oropharynx is clear and moist.   Eyes: Pupils are equal, round, and reactive to light. Conjunctivae are normal.   Neck: Normal range of motion. Neck supple. No tracheal deviation present. No thyromegaly present. Cardiovascular: Normal rate, regular rhythm, normal heart sounds and intact distal pulses. Exam reveals no gallop and no friction rub. No murmur heard. Pulmonary/Chest: Effort normal and breath sounds normal. No respiratory distress. She has no wheezes. She has no rales. She exhibits no tenderness. Abdominal: Soft. Bowel sounds are normal. She exhibits no distension and no mass. There is no tenderness. There is no rebound and no guarding. Musculoskeletal: Normal range of motion. She exhibits no deformity. Right lower leg: She exhibits tenderness and edema. Legs:  Lymphadenopathy:     She has no cervical adenopathy. Neurological: She is alert and oriented to person, place, and time. She has normal reflexes. No cranial nerve deficit. Coordination normal.   Skin: Skin is warm and dry. No rash noted. No erythema. Psychiatric: She has a normal mood and affect. Her behavior is normal. Judgment and thought content normal.       ASSESSMENT/PLAN:    Elmo Jacob was seen today for leg swelling. Diagnoses and all orders for this visit:    Thrombophlebitis of superficial veins of right lower extremity  -     naproxen (NAPROSYN) 250 MG tablet; Take 1 tablet by mouth 2 times daily (with meals)  -     cefdinir (OMNICEF) 300 MG capsule; Take 1 capsule by mouth 2 times daily for 10 days  -     ranitidine (ZANTAC) 150 MG tablet; Take 1 tablet by mouth 2 times daily    Varicose veins of both lower extremities  -     Compression Stockings MISC; by Does not apply route    Encouraged to keep appt with vascular surgeon.     Return in about 2 weeks (around 9/18/2018) for with

## 2018-09-14 ENCOUNTER — OFFICE VISIT (OUTPATIENT)
Dept: VASCULAR SURGERY | Age: 68
End: 2018-09-14

## 2018-09-14 ENCOUNTER — PROCEDURE VISIT (OUTPATIENT)
Dept: VASCULAR SURGERY | Age: 68
End: 2018-09-14

## 2018-09-14 VITALS
HEIGHT: 66 IN | WEIGHT: 177 LBS | HEART RATE: 87 BPM | BODY MASS INDEX: 28.45 KG/M2 | SYSTOLIC BLOOD PRESSURE: 126 MMHG | DIASTOLIC BLOOD PRESSURE: 97 MMHG

## 2018-09-14 DIAGNOSIS — M79.604 PAIN OF RIGHT LOWER EXTREMITY: Primary | ICD-10-CM

## 2018-09-14 DIAGNOSIS — I83.93 VARICOSE VEINS OF BOTH LOWER EXTREMITIES: Primary | ICD-10-CM

## 2018-09-14 DIAGNOSIS — I80.01 THROMBOPHLEBITIS OF SUPERFICIAL VEINS OF RIGHT LOWER EXTREMITY: ICD-10-CM

## 2018-09-14 PROCEDURE — G8428 CUR MEDS NOT DOCUMENT: HCPCS | Performed by: SURGERY

## 2018-09-14 PROCEDURE — 99204 OFFICE O/P NEW MOD 45 MIN: CPT | Performed by: SURGERY

## 2018-09-14 PROCEDURE — 1101F PT FALLS ASSESS-DOCD LE1/YR: CPT | Performed by: SURGERY

## 2018-09-14 PROCEDURE — 1090F PRES/ABSN URINE INCON ASSESS: CPT | Performed by: SURGERY

## 2018-09-14 PROCEDURE — 1123F ACP DISCUSS/DSCN MKR DOCD: CPT | Performed by: SURGERY

## 2018-09-14 PROCEDURE — G8399 PT W/DXA RESULTS DOCUMENT: HCPCS | Performed by: SURGERY

## 2018-09-14 PROCEDURE — 4040F PNEUMOC VAC/ADMIN/RCVD: CPT | Performed by: SURGERY

## 2018-09-14 PROCEDURE — 93971 EXTREMITY STUDY: CPT | Performed by: SURGERY

## 2018-09-14 PROCEDURE — 3017F COLORECTAL CA SCREEN DOC REV: CPT | Performed by: SURGERY

## 2018-09-14 PROCEDURE — G8417 CALC BMI ABV UP PARAM F/U: HCPCS | Performed by: SURGERY

## 2018-09-14 PROCEDURE — 1036F TOBACCO NON-USER: CPT | Performed by: SURGERY

## 2018-09-14 ASSESSMENT — ENCOUNTER SYMPTOMS
GASTROINTESTINAL NEGATIVE: 1
EYES NEGATIVE: 1
RESPIRATORY NEGATIVE: 1
ALLERGIC/IMMUNOLOGIC NEGATIVE: 1

## 2018-09-14 NOTE — PROGRESS NOTES
Coordination normal.   Skin: Skin is warm and dry. Hemosiderin discoloration B gaitor regions   Nursing note and vitals reviewed. Hard mass - mild tenderness without erythema medial R calf    R size  L size   +++  Spider  telangiectasias +++      Reticular veins     Calf/thigh >10 mm Varicose   veins Calf/thigh >10 mm     Venous scan today + SVT - no axial or deep extension  Assessment:      1) Symptomatic varicose veins R leg with SVT  2) Asymptomatic varicose veins L leg      Plan:      Begin TH 20/30 mmHg compression stocking. Warm compresses R calf BID. Activity unrestricted. F/U 4-8 weeks. Will plan further w/u (VRS) and treatment after SVT clinically resolved. Pt and  understand and agree to this approach.         Molina Patton MA

## 2018-10-01 ENCOUNTER — OFFICE VISIT (OUTPATIENT)
Dept: FAMILY MEDICINE CLINIC | Age: 68
End: 2018-10-01
Payer: MEDICARE

## 2018-10-01 VITALS
BODY MASS INDEX: 28.57 KG/M2 | TEMPERATURE: 99 F | HEART RATE: 89 BPM | SYSTOLIC BLOOD PRESSURE: 112 MMHG | OXYGEN SATURATION: 97 % | DIASTOLIC BLOOD PRESSURE: 70 MMHG | WEIGHT: 177 LBS

## 2018-10-01 DIAGNOSIS — R31.9 HEMATURIA, UNSPECIFIED TYPE: ICD-10-CM

## 2018-10-01 DIAGNOSIS — K57.92 DIVERTICULITIS: Primary | ICD-10-CM

## 2018-10-01 DIAGNOSIS — R10.9 ABDOMINAL PAIN, UNSPECIFIED ABDOMINAL LOCATION: Primary | ICD-10-CM

## 2018-10-01 LAB
BACTERIA URINE, POC: 0
BILIRUBIN URINE: 0 MG/DL
BLOOD, URINE: POSITIVE
CASTS URINE, POC: 0
CLARITY: ABNORMAL
COLOR: YELLOW
CRYSTALS URINE, POC: 0
EPI CELLS URINE, POC: 0
GLUCOSE URINE: NEGATIVE
KETONES, URINE: NEGATIVE
LEUKOCYTE EST, POC: ABNORMAL
NITRITE, URINE: NEGATIVE
PH UA: 5.5 (ref 4.5–8)
PROTEIN UA: NEGATIVE
RBC URINE, POC: ABNORMAL
SPECIFIC GRAVITY UA: 1.02 (ref 1–1.03)
UROBILINOGEN, URINE: NORMAL
WBC URINE, POC: 0
YEAST URINE, POC: 0

## 2018-10-01 PROCEDURE — 3017F COLORECTAL CA SCREEN DOC REV: CPT | Performed by: PHYSICIAN ASSISTANT

## 2018-10-01 PROCEDURE — 99213 OFFICE O/P EST LOW 20 MIN: CPT | Performed by: PHYSICIAN ASSISTANT

## 2018-10-01 PROCEDURE — 81000 URINALYSIS NONAUTO W/SCOPE: CPT | Performed by: PHYSICIAN ASSISTANT

## 2018-10-01 PROCEDURE — G8484 FLU IMMUNIZE NO ADMIN: HCPCS | Performed by: PHYSICIAN ASSISTANT

## 2018-10-01 PROCEDURE — 1101F PT FALLS ASSESS-DOCD LE1/YR: CPT | Performed by: PHYSICIAN ASSISTANT

## 2018-10-01 PROCEDURE — 1036F TOBACCO NON-USER: CPT | Performed by: PHYSICIAN ASSISTANT

## 2018-10-01 PROCEDURE — 1123F ACP DISCUSS/DSCN MKR DOCD: CPT | Performed by: PHYSICIAN ASSISTANT

## 2018-10-01 PROCEDURE — G8427 DOCREV CUR MEDS BY ELIG CLIN: HCPCS | Performed by: PHYSICIAN ASSISTANT

## 2018-10-01 PROCEDURE — G8399 PT W/DXA RESULTS DOCUMENT: HCPCS | Performed by: PHYSICIAN ASSISTANT

## 2018-10-01 PROCEDURE — 4040F PNEUMOC VAC/ADMIN/RCVD: CPT | Performed by: PHYSICIAN ASSISTANT

## 2018-10-01 PROCEDURE — 1090F PRES/ABSN URINE INCON ASSESS: CPT | Performed by: PHYSICIAN ASSISTANT

## 2018-10-01 PROCEDURE — G8417 CALC BMI ABV UP PARAM F/U: HCPCS | Performed by: PHYSICIAN ASSISTANT

## 2018-10-01 RX ORDER — CIPROFLOXACIN 500 MG/1
500 TABLET, FILM COATED ORAL 2 TIMES DAILY
Qty: 20 TABLET | Refills: 0 | Status: SHIPPED | OUTPATIENT
Start: 2018-10-01 | End: 2018-10-11

## 2018-10-01 RX ORDER — METRONIDAZOLE 500 MG/1
500 TABLET ORAL 3 TIMES DAILY
Qty: 30 TABLET | Refills: 0 | Status: SHIPPED | OUTPATIENT
Start: 2018-10-01 | End: 2018-10-11

## 2018-10-01 ASSESSMENT — ENCOUNTER SYMPTOMS
BACK PAIN: 0
VOMITING: 0
CONSTIPATION: 0
ABDOMINAL PAIN: 1
DIARRHEA: 0
NAUSEA: 0
SHORTNESS OF BREATH: 0

## 2018-10-01 NOTE — PROGRESS NOTES
Subjective:      Patient ID: Renetta Sung is a 70 y.o. female. HPI  Patient is here today with a 2-3 history of lower abdominal pain. She started with a fever yesterday, up to 100. No bowel/bladder complaints. Last time she had this, it was a diverticulitis flare up. No chills. Appetite is ok but not completely normal. Pain level is 6/10 on average. No radiating pain. Review of Systems   Constitutional: Negative for appetite change, chills and fever. Respiratory: Negative for shortness of breath. Cardiovascular: Negative for chest pain. Gastrointestinal: Positive for abdominal pain. Negative for constipation, diarrhea, nausea and vomiting. Genitourinary: Negative for difficulty urinating, dysuria, frequency, hematuria and urgency. Musculoskeletal: Negative for back pain. Objective:   Physical Exam   Constitutional: She is oriented to person, place, and time. Vital signs are normal. She appears well-developed and well-nourished. She is cooperative. Cardiovascular: Normal rate, regular rhythm and normal heart sounds. Pulmonary/Chest: Effort normal and breath sounds normal. No respiratory distress. She has no decreased breath sounds. Abdominal: Soft. Normal appearance and bowel sounds are normal. She exhibits no distension and no mass. There is no hepatosplenomegaly. There is tenderness in the right lower quadrant, suprapubic area and left lower quadrant. There is no rigidity, no rebound, no guarding and no CVA tenderness. No hernia. Neurological: She is alert and oriented to person, place, and time. Assessment:       Isabella Montes was seen today for abdominal pain. Diagnoses and all orders for this visit:    Diverticulitis  -     ciprofloxacin (CIPRO) 500 MG tablet; Take 1 tablet by mouth 2 times daily for 10 days  -     metroNIDAZOLE (FLAGYL) 500 MG tablet;  Take 1 tablet by mouth 3 times daily for 10 days    Hematuria, unspecified type               Plan:          UA had

## 2018-10-15 ENCOUNTER — HOSPITAL ENCOUNTER (INPATIENT)
Age: 68
LOS: 1 days | Discharge: HOME | DRG: 694 | End: 2018-10-16
Attending: EMERGENCY MEDICINE | Admitting: INTERNAL MEDICINE
Payer: MEDICARE

## 2018-10-15 ENCOUNTER — APPOINTMENT (OUTPATIENT)
Dept: CT IMAGING | Age: 68
DRG: 694 | End: 2018-10-15
Payer: MEDICARE

## 2018-10-15 DIAGNOSIS — N20.0 NEPHROLITHIASIS: ICD-10-CM

## 2018-10-15 DIAGNOSIS — N17.9 AKI (ACUTE KIDNEY INJURY) (HCC): Primary | ICD-10-CM

## 2018-10-15 DIAGNOSIS — Q62.11 HYDRONEPHROSIS WITH URETEROPELVIC JUNCTION (UPJ) OBSTRUCTION: ICD-10-CM

## 2018-10-15 PROBLEM — R01.1 HEART MURMUR: Status: ACTIVE | Noted: 2018-10-15

## 2018-10-15 PROBLEM — N13.2 HYDRONEPHROSIS WITH RENAL AND URETERAL CALCULOUS OBSTRUCTION: Status: ACTIVE | Noted: 2018-10-15

## 2018-10-15 LAB
A/G RATIO: 1.7 (ref 1.1–2.2)
ALBUMIN SERPL-MCNC: 4.5 G/DL (ref 3.4–5)
ALP BLD-CCNC: 68 U/L (ref 40–129)
ALT SERPL-CCNC: 21 U/L (ref 10–40)
ANION GAP SERPL CALCULATED.3IONS-SCNC: 14 MMOL/L (ref 3–16)
ANION GAP SERPL CALCULATED.3IONS-SCNC: 17 MMOL/L (ref 3–16)
AST SERPL-CCNC: 25 U/L (ref 15–37)
BACTERIA: ABNORMAL /HPF
BASOPHILS ABSOLUTE: 0.2 K/UL (ref 0–0.2)
BASOPHILS RELATIVE PERCENT: 2.1 %
BILIRUB SERPL-MCNC: 0.4 MG/DL (ref 0–1)
BILIRUBIN URINE: NEGATIVE
BLOOD, URINE: ABNORMAL
BUN BLDV-MCNC: 20 MG/DL (ref 7–20)
BUN BLDV-MCNC: 23 MG/DL (ref 7–20)
CALCIUM SERPL-MCNC: 8.5 MG/DL (ref 8.3–10.6)
CALCIUM SERPL-MCNC: 8.9 MG/DL (ref 8.3–10.6)
CHLORIDE BLD-SCNC: 104 MMOL/L (ref 99–110)
CHLORIDE BLD-SCNC: 106 MMOL/L (ref 99–110)
CLARITY: CLEAR
CO2: 20 MMOL/L (ref 21–32)
CO2: 22 MMOL/L (ref 21–32)
COLOR: YELLOW
CREAT SERPL-MCNC: 1.4 MG/DL (ref 0.6–1.2)
CREAT SERPL-MCNC: 1.4 MG/DL (ref 0.6–1.2)
EOSINOPHILS ABSOLUTE: 0.1 K/UL (ref 0–0.6)
EOSINOPHILS RELATIVE PERCENT: 0.9 %
EPITHELIAL CELLS, UA: ABNORMAL /HPF
GFR AFRICAN AMERICAN: 45
GFR AFRICAN AMERICAN: 45
GFR NON-AFRICAN AMERICAN: 37
GFR NON-AFRICAN AMERICAN: 37
GLOBULIN: 2.7 G/DL
GLUCOSE BLD-MCNC: 119 MG/DL (ref 70–99)
GLUCOSE BLD-MCNC: 128 MG/DL (ref 70–99)
GLUCOSE URINE: NEGATIVE MG/DL
HCT VFR BLD CALC: 37.5 % (ref 36–48)
HCT VFR BLD CALC: 41.2 % (ref 36–48)
HEMOGLOBIN: 12.6 G/DL (ref 12–16)
HEMOGLOBIN: 13.9 G/DL (ref 12–16)
KETONES, URINE: NEGATIVE MG/DL
LEUKOCYTE ESTERASE, URINE: NEGATIVE
LIPASE: 46 U/L (ref 13–60)
LV EF: 63 %
LVEF MODALITY: NORMAL
LYMPHOCYTES ABSOLUTE: 2.1 K/UL (ref 1–5.1)
LYMPHOCYTES RELATIVE PERCENT: 18.1 %
MAGNESIUM: 2.1 MG/DL (ref 1.8–2.4)
MCH RBC QN AUTO: 30.7 PG (ref 26–34)
MCH RBC QN AUTO: 31.1 PG (ref 26–34)
MCHC RBC AUTO-ENTMCNC: 33.7 G/DL (ref 31–36)
MCHC RBC AUTO-ENTMCNC: 33.7 G/DL (ref 31–36)
MCV RBC AUTO: 91 FL (ref 80–100)
MCV RBC AUTO: 92.4 FL (ref 80–100)
MICROSCOPIC EXAMINATION: YES
MONOCYTES ABSOLUTE: 0.4 K/UL (ref 0–1.3)
MONOCYTES RELATIVE PERCENT: 3.5 %
NEUTROPHILS ABSOLUTE: 8.6 K/UL (ref 1.7–7.7)
NEUTROPHILS RELATIVE PERCENT: 75.4 %
NITRITE, URINE: NEGATIVE
PDW BLD-RTO: 12.9 % (ref 12.4–15.4)
PDW BLD-RTO: 13.1 % (ref 12.4–15.4)
PH UA: 5.5
PLATELET # BLD: 199 K/UL (ref 135–450)
PLATELET # BLD: 221 K/UL (ref 135–450)
PMV BLD AUTO: 9.4 FL (ref 5–10.5)
PMV BLD AUTO: 9.5 FL (ref 5–10.5)
POTASSIUM REFLEX MAGNESIUM: 3.5 MMOL/L (ref 3.5–5.1)
POTASSIUM REFLEX MAGNESIUM: 3.9 MMOL/L (ref 3.5–5.1)
PROTEIN UA: 30 MG/DL
RBC # BLD: 4.06 M/UL (ref 4–5.2)
RBC # BLD: 4.53 M/UL (ref 4–5.2)
RBC UA: ABNORMAL /HPF (ref 0–2)
RENAL EPITHELIAL, UA: ABNORMAL /HPF
SODIUM BLD-SCNC: 141 MMOL/L (ref 136–145)
SODIUM BLD-SCNC: 142 MMOL/L (ref 136–145)
SPECIFIC GRAVITY UA: >=1.03
TOTAL PROTEIN: 7.2 G/DL (ref 6.4–8.2)
URINE TYPE: ABNORMAL
UROBILINOGEN, URINE: 0.2 E.U./DL
WBC # BLD: 11.5 K/UL (ref 4–11)
WBC # BLD: 9.5 K/UL (ref 4–11)
WBC UA: ABNORMAL /HPF (ref 0–5)

## 2018-10-15 PROCEDURE — 83690 ASSAY OF LIPASE: CPT

## 2018-10-15 PROCEDURE — 90686 IIV4 VACC NO PRSV 0.5 ML IM: CPT | Performed by: FAMILY MEDICINE

## 2018-10-15 PROCEDURE — 6360000002 HC RX W HCPCS: Performed by: FAMILY MEDICINE

## 2018-10-15 PROCEDURE — 6360000002 HC RX W HCPCS: Performed by: EMERGENCY MEDICINE

## 2018-10-15 PROCEDURE — 93306 TTE W/DOPPLER COMPLETE: CPT

## 2018-10-15 PROCEDURE — 81001 URINALYSIS AUTO W/SCOPE: CPT

## 2018-10-15 PROCEDURE — 80053 COMPREHEN METABOLIC PANEL: CPT

## 2018-10-15 PROCEDURE — 85025 COMPLETE CBC W/AUTO DIFF WBC: CPT

## 2018-10-15 PROCEDURE — 36415 COLL VENOUS BLD VENIPUNCTURE: CPT

## 2018-10-15 PROCEDURE — 99285 EMERGENCY DEPT VISIT HI MDM: CPT

## 2018-10-15 PROCEDURE — 2580000003 HC RX 258: Performed by: FAMILY MEDICINE

## 2018-10-15 PROCEDURE — 82365 CALCULUS SPECTROSCOPY: CPT

## 2018-10-15 PROCEDURE — G0008 ADMIN INFLUENZA VIRUS VAC: HCPCS | Performed by: FAMILY MEDICINE

## 2018-10-15 PROCEDURE — 85027 COMPLETE CBC AUTOMATED: CPT

## 2018-10-15 PROCEDURE — 1200000000 HC SEMI PRIVATE

## 2018-10-15 PROCEDURE — 96375 TX/PRO/DX INJ NEW DRUG ADDON: CPT

## 2018-10-15 PROCEDURE — 6370000000 HC RX 637 (ALT 250 FOR IP): Performed by: FAMILY MEDICINE

## 2018-10-15 PROCEDURE — 96374 THER/PROPH/DIAG INJ IV PUSH: CPT

## 2018-10-15 PROCEDURE — 74176 CT ABD & PELVIS W/O CONTRAST: CPT

## 2018-10-15 PROCEDURE — 87086 URINE CULTURE/COLONY COUNT: CPT

## 2018-10-15 PROCEDURE — 83735 ASSAY OF MAGNESIUM: CPT

## 2018-10-15 RX ORDER — FAMOTIDINE 20 MG/1
20 TABLET, FILM COATED ORAL DAILY
Status: DISCONTINUED | OUTPATIENT
Start: 2018-10-15 | End: 2018-10-16 | Stop reason: HOSPADM

## 2018-10-15 RX ORDER — MAGNESIUM SULFATE 1 G/100ML
1 INJECTION INTRAVENOUS PRN
Status: DISCONTINUED | OUTPATIENT
Start: 2018-10-15 | End: 2018-10-16 | Stop reason: HOSPADM

## 2018-10-15 RX ORDER — SODIUM CHLORIDE 0.9 % (FLUSH) 0.9 %
10 SYRINGE (ML) INJECTION PRN
Status: DISCONTINUED | OUTPATIENT
Start: 2018-10-15 | End: 2018-10-16 | Stop reason: HOSPADM

## 2018-10-15 RX ORDER — POTASSIUM CHLORIDE 20 MEQ/1
40 TABLET, EXTENDED RELEASE ORAL PRN
Status: DISCONTINUED | OUTPATIENT
Start: 2018-10-15 | End: 2018-10-16 | Stop reason: HOSPADM

## 2018-10-15 RX ORDER — ONDANSETRON 2 MG/ML
8 INJECTION INTRAMUSCULAR; INTRAVENOUS ONCE
Status: COMPLETED | OUTPATIENT
Start: 2018-10-15 | End: 2018-10-15

## 2018-10-15 RX ORDER — ACETAMINOPHEN 325 MG/1
650 TABLET ORAL EVERY 4 HOURS PRN
Status: DISCONTINUED | OUTPATIENT
Start: 2018-10-15 | End: 2018-10-16 | Stop reason: HOSPADM

## 2018-10-15 RX ORDER — POTASSIUM CHLORIDE 7.45 MG/ML
10 INJECTION INTRAVENOUS PRN
Status: DISCONTINUED | OUTPATIENT
Start: 2018-10-15 | End: 2018-10-16 | Stop reason: HOSPADM

## 2018-10-15 RX ORDER — SODIUM CHLORIDE 9 MG/ML
INJECTION, SOLUTION INTRAVENOUS CONTINUOUS
Status: DISCONTINUED | OUTPATIENT
Start: 2018-10-15 | End: 2018-10-16 | Stop reason: HOSPADM

## 2018-10-15 RX ORDER — HYDRALAZINE HYDROCHLORIDE 20 MG/ML
10 INJECTION INTRAMUSCULAR; INTRAVENOUS EVERY 6 HOURS PRN
Status: DISCONTINUED | OUTPATIENT
Start: 2018-10-15 | End: 2018-10-16 | Stop reason: HOSPADM

## 2018-10-15 RX ORDER — SODIUM CHLORIDE 0.9 % (FLUSH) 0.9 %
10 SYRINGE (ML) INJECTION EVERY 12 HOURS SCHEDULED
Status: DISCONTINUED | OUTPATIENT
Start: 2018-10-15 | End: 2018-10-16 | Stop reason: HOSPADM

## 2018-10-15 RX ORDER — KETOROLAC TROMETHAMINE 30 MG/ML
30 INJECTION, SOLUTION INTRAMUSCULAR; INTRAVENOUS ONCE
Status: COMPLETED | OUTPATIENT
Start: 2018-10-15 | End: 2018-10-15

## 2018-10-15 RX ORDER — ONDANSETRON 2 MG/ML
4 INJECTION INTRAMUSCULAR; INTRAVENOUS EVERY 6 HOURS PRN
Status: DISCONTINUED | OUTPATIENT
Start: 2018-10-15 | End: 2018-10-16 | Stop reason: HOSPADM

## 2018-10-15 RX ADMIN — KETOROLAC TROMETHAMINE 30 MG: 30 INJECTION, SOLUTION INTRAMUSCULAR at 00:36

## 2018-10-15 RX ADMIN — SODIUM CHLORIDE: 900 INJECTION, SOLUTION INTRAVENOUS at 03:36

## 2018-10-15 RX ADMIN — ONDANSETRON 8 MG: 2 INJECTION INTRAMUSCULAR; INTRAVENOUS at 00:36

## 2018-10-15 RX ADMIN — SODIUM CHLORIDE: 900 INJECTION, SOLUTION INTRAVENOUS at 19:19

## 2018-10-15 RX ADMIN — INFLUENZA A VIRUS A/MICHIGAN/45/2015 X-275 (H1N1) ANTIGEN (FORMALDEHYDE INACTIVATED), INFLUENZA A VIRUS A/SINGAPORE/INFIMH-16-0019/2016 IVR-186 (H3N2) ANTIGEN (FORMALDEHYDE INACTIVATED), INFLUENZA B VIRUS B/PHUKET/3073/2013 ANTIGEN (FORMALDEHYDE INACTIVATED), AND INFLUENZA B VIRUS B/MARYLAND/15/2016 BX-69A ANTIGEN (FORMALDEHYDE INACTIVATED) 0.5 ML: 15; 15; 15; 15 INJECTION, SUSPENSION INTRAMUSCULAR at 08:26

## 2018-10-15 RX ADMIN — FAMOTIDINE 20 MG: 20 TABLET, FILM COATED ORAL at 08:26

## 2018-10-15 ASSESSMENT — PAIN SCALES - GENERAL
PAINLEVEL_OUTOF10: 0
PAINLEVEL_OUTOF10: 0
PAINLEVEL_OUTOF10: 8
PAINLEVEL_OUTOF10: 8
PAINLEVEL_OUTOF10: 0

## 2018-10-15 ASSESSMENT — ENCOUNTER SYMPTOMS
DIARRHEA: 0
EYES NEGATIVE: 1
COUGH: 0
NAUSEA: 1
ABDOMINAL PAIN: 1
BACK PAIN: 1
CONSTIPATION: 0
SHORTNESS OF BREATH: 0

## 2018-10-15 ASSESSMENT — PAIN DESCRIPTION - DESCRIPTORS: DESCRIPTORS: ACHING

## 2018-10-15 ASSESSMENT — PAIN DESCRIPTION - ORIENTATION: ORIENTATION: RIGHT

## 2018-10-15 ASSESSMENT — PAIN DESCRIPTION - LOCATION: LOCATION: ABDOMEN;FLANK

## 2018-10-15 ASSESSMENT — PAIN DESCRIPTION - PAIN TYPE: TYPE: ACUTE PAIN

## 2018-10-15 NOTE — CONSULTS
Urology Attending Consult Note      Reason for Consultation: Rt distal stone     History: 71 yo F with no prior h/o stones, presents with abd pain radiating wot Rt flank with associated gross hematuria and N/V. CT shows a Rt 5mm UVJ stone with moderate hydro. Cr @ 1.4 and WBC @ 11.5 on admission. UA with large blood, 6-10 WBC. Culture pending. Afebrile     Family History, Social History, Review of Systems:  Reviewed and agreed to as per chart    Vitals:  /75   Pulse 71   Temp 98.5 °F (36.9 °C) (Oral)   Resp 20   Ht 5' 6.14\" (1.68 m)   Wt 177 lb 0.5 oz (80.3 kg)   SpO2 96%   BMI 28.45 kg/m²   Temp  Av.1 °F (36.7 °C)  Min: 97.5 °F (36.4 °C)  Max: 98.5 °F (36.9 °C)  No intake or output data in the 24 hours ending 10/15/18 0919      Physical:   Well developed, well nourished in no acute distress   Mood indicates no abnormalities. Pt doesnt appear depressed   Orientated to time and place   Neck is supple, trachea is midline   Respiratory effort is normal   Cardiovascular show no extremity swelling   Abdomen no masses or hernias are palpated, there is no tenderness. Liver and Spleen appear normal.   Skin show no abnormal lesions   Lymph nodes are not palpated in the inguinal, neck, or axillary area.     Female :    External Genitalia show no irritation or erythema   Urethral Meatus appears to be normal in size and location   Urethra is normal with no tenderness or masses   Bladder is non tender   Voiding clear urine       Labs:  WBC:    Lab Results   Component Value Date    WBC 9.5 10/15/2018     Hemoglobin/Hematocrit:    Lab Results   Component Value Date    HGB 12.6 10/15/2018    HCT 37.5 10/15/2018     BMP:    Lab Results   Component Value Date     10/15/2018    K 3.9 10/15/2018     10/15/2018    CO2 22 10/15/2018    BUN 23 10/15/2018    LABALBU 4.5 10/15/2018    CREATININE 1.4 10/15/2018    CALCIUM 8.5 10/15/2018    GFRAA 45 10/15/2018    LABGLOM 37 10/15/2018

## 2018-10-15 NOTE — ED PROVIDER NOTES
ordered, CT scan imaging ordered, IV fluids, Toradol, and Zofran 8 mg. Labs returned notable for creatinine of 1.4 (baseline 1), WBC 11.5, UA with large blood and negative nitrites/leukoesterase. CT with 5 mm right UVJ stone with moderate hydronephrosis. On reassessment we discussed the results of her labs and CT imaging. At that time she reported her nausea and pain had significantly improved. Discussed patient with urology who asked patient be made n.p.o. for potential intervention and stated they would round on her in the morning. Discussed patient with the hospitalist who accepted her for admission. Clinical Impression     1. Hydronephrosis with ureteropelvic junction (UPJ) obstruction    2. Right flank pain    3. Nausea    4. Nephrolithiasis    5. IBRAHIMA (acute kidney injury) ([address]      Disposition/Plan     DISPOSITION    At this time the patient has been admitted to hospitalist medicine with urology consult for further evaluation and management. Thepatient will continue to be monitored here in the emergency department until which time she is moved to her new treatment location. Gloria Leahy MD, PGY-4   Emergency Medicine        Maria Manriquez MD  Resident  10/15/18 2509

## 2018-10-15 NOTE — H&P
[facility]    10/15/2018 3:17 AM    Patient Information:  Abdullahi Crain is a 70 y.o. female [id number]  PCP:  Ton Toure MD (Tel: [phone] )    Chief complaint:    Chief Complaint   Patient presents with    Abdominal Pain    Hematuria        History of Present Illness:  Semaj Lui is a 70 y.o. female who presents with abdominal pain and hematuria. Patient reports it started earlier this evening. It has progressed and continued to worsen. She is finally got some relief after pain medicine in the emergency room. She had some nausea and some vomiting as well. The pain seems to be centered along the right abdomen and flank. She denies fever or chills. REVIEW OF SYSTEMS:   Constitutional: Negative for fever,chills or night sweats  ENT: Negative for rhinorrhea, epistaxis, hoarseness, sore throat. Respiratory: Negative for shortness of breath,wheezing  Cardiovascular: Negative for chest pain, palpitations   Gastrointestinal: Per HPI  Genitourinary: Negative for polyuria, dysuria: Positive hematuria  Hematologic/Lymphatic: Negative for bleeding tendency, easy bruising  Musculoskeletal: Negative for myalgias and arthralgias  Neurologic: Negative for confusion,dysarthria. Skin: Negative for itching,rash  Psychiatric: Negative for depression,anxiety, agitation. Endocrine: Negative for polydipsia,polyuria,heat /cold intolerance. Past Medical History:   has a past medical history of Asymptomatic varicose veins; Family history of malignant neoplasm of gastrointestinal tract; and Hypertension. Past Surgical History:   has a past surgical history that includes Hysterectomy; Finger amputation; and other surgical history. Medications:  No current facility-administered medications on file prior to encounter.       Current Outpatient Prescriptions on File Prior to Encounter   Medication Sig 10/15/2018    MPV 9.4 10/15/2018     BMP:    Lab Results   Component Value Date     10/15/2018    K 3.5 10/15/2018     10/15/2018    CO2 20 10/15/2018    BUN 20 10/15/2018    CREATININE 1.4 10/15/2018    CALCIUM 8.9 10/15/2018    GFRAA 45 10/15/2018    LABGLOM 37 10/15/2018    GLUCOSE 128 10/15/2018         Imaging: CT showed 5 mm right UVJ stone with moderate hydronephrosis         Assessment/Plan:   Principal Problem:    Hydronephrosis with renal and ureteral calculous obstruction--patient has an obstructing stone. She has never had kidney stones before however she does take HydroChlorothiazide as one of her blood pressure medications. This is an old medicine and not a new addition. I will hold that in addition to holding her ACE inhibitor because of her acute kidney injury. We'll give her IV hydration. We will plan to strain her urine. Consult to urology for evaluation as she may require stone extraction with stenting. Active Problems:    Essential hypertension--hold her ACE inhibitor and hydrochlorothiazide at this time. We'll cover her with p.r.n. IV hydralazine at the moment. Once her renal function returns to normal as it is likely related to obstruction she will be able to resume her blood pressure medications. We may consider stopping her hydrochlorothiazide in light of the kidney stone. IBRAHIMA (acute kidney injury) (HCC)--hold hydrochlorothiazide and ACE inhibitor. We'll give IV hydration. The acute kidney injury is likely related to ureteral obstruction    Heart murmur--patient's denies that she has ever been told she had a murmur. Patient denies any chest pain shortness of breath or palpitations. In light of her likely needing surgical intervention we'll plan to check an echo to rule out significant aortic stenosis preoperatively. Admit as inpatient.  I anticipate hospitalization spanning more than two midnights for investigation and treatment of the above medically

## 2018-10-16 VITALS
WEIGHT: 177.03 LBS | RESPIRATION RATE: 16 BRPM | DIASTOLIC BLOOD PRESSURE: 80 MMHG | OXYGEN SATURATION: 93 % | HEIGHT: 66 IN | TEMPERATURE: 98.1 F | BODY MASS INDEX: 28.45 KG/M2 | HEART RATE: 75 BPM | SYSTOLIC BLOOD PRESSURE: 137 MMHG

## 2018-10-16 LAB
ANION GAP SERPL CALCULATED.3IONS-SCNC: 14 MMOL/L (ref 3–16)
BUN BLDV-MCNC: 13 MG/DL (ref 7–20)
CALCIUM SERPL-MCNC: 7.9 MG/DL (ref 8.3–10.6)
CHLORIDE BLD-SCNC: 108 MMOL/L (ref 99–110)
CO2: 18 MMOL/L (ref 21–32)
CREAT SERPL-MCNC: 0.8 MG/DL (ref 0.6–1.2)
GFR AFRICAN AMERICAN: >60
GFR NON-AFRICAN AMERICAN: >60
GLUCOSE BLD-MCNC: 135 MG/DL (ref 70–99)
POTASSIUM REFLEX MAGNESIUM: 3.7 MMOL/L (ref 3.5–5.1)
SODIUM BLD-SCNC: 140 MMOL/L (ref 136–145)
URINE CULTURE, ROUTINE: NORMAL

## 2018-10-16 PROCEDURE — 36415 COLL VENOUS BLD VENIPUNCTURE: CPT

## 2018-10-16 PROCEDURE — 2580000003 HC RX 258: Performed by: FAMILY MEDICINE

## 2018-10-16 PROCEDURE — 6370000000 HC RX 637 (ALT 250 FOR IP): Performed by: FAMILY MEDICINE

## 2018-10-16 PROCEDURE — 80048 BASIC METABOLIC PNL TOTAL CA: CPT

## 2018-10-16 RX ADMIN — SODIUM CHLORIDE: 900 INJECTION, SOLUTION INTRAVENOUS at 02:49

## 2018-10-16 RX ADMIN — Medication 10 ML: at 09:48

## 2018-10-16 RX ADMIN — SODIUM CHLORIDE: 900 INJECTION, SOLUTION INTRAVENOUS at 10:58

## 2018-10-16 RX ADMIN — FAMOTIDINE 20 MG: 20 TABLET, FILM COATED ORAL at 09:48

## 2018-10-16 ASSESSMENT — PAIN SCALES - GENERAL
PAINLEVEL_OUTOF10: 0
PAINLEVEL_OUTOF10: 0

## 2018-10-17 ENCOUNTER — TELEPHONE (OUTPATIENT)
Dept: FAMILY MEDICINE CLINIC | Age: 68
End: 2018-10-17

## 2018-10-17 LAB
CALCULI COMPOSITION: NORMAL
MASS: 31 MG
STONE DESCRIPTION: NORMAL
STONE NUMBER: 1
STONE SIZE: NORMAL MM

## 2018-10-23 ENCOUNTER — OFFICE VISIT (OUTPATIENT)
Dept: FAMILY MEDICINE CLINIC | Age: 68
End: 2018-10-23
Payer: MEDICARE

## 2018-10-23 VITALS
SYSTOLIC BLOOD PRESSURE: 130 MMHG | WEIGHT: 177 LBS | OXYGEN SATURATION: 98 % | DIASTOLIC BLOOD PRESSURE: 80 MMHG | HEART RATE: 71 BPM | BODY MASS INDEX: 28.45 KG/M2

## 2018-10-23 DIAGNOSIS — N13.30 HYDRONEPHROSIS OF RIGHT KIDNEY: ICD-10-CM

## 2018-10-23 DIAGNOSIS — N20.0 KIDNEY STONE: Primary | ICD-10-CM

## 2018-10-23 PROCEDURE — G8417 CALC BMI ABV UP PARAM F/U: HCPCS | Performed by: PHYSICIAN ASSISTANT

## 2018-10-23 PROCEDURE — G8482 FLU IMMUNIZE ORDER/ADMIN: HCPCS | Performed by: PHYSICIAN ASSISTANT

## 2018-10-23 PROCEDURE — 1123F ACP DISCUSS/DSCN MKR DOCD: CPT | Performed by: PHYSICIAN ASSISTANT

## 2018-10-23 PROCEDURE — 4040F PNEUMOC VAC/ADMIN/RCVD: CPT | Performed by: PHYSICIAN ASSISTANT

## 2018-10-23 PROCEDURE — G8399 PT W/DXA RESULTS DOCUMENT: HCPCS | Performed by: PHYSICIAN ASSISTANT

## 2018-10-23 PROCEDURE — 1036F TOBACCO NON-USER: CPT | Performed by: PHYSICIAN ASSISTANT

## 2018-10-23 PROCEDURE — 1090F PRES/ABSN URINE INCON ASSESS: CPT | Performed by: PHYSICIAN ASSISTANT

## 2018-10-23 PROCEDURE — 1101F PT FALLS ASSESS-DOCD LE1/YR: CPT | Performed by: PHYSICIAN ASSISTANT

## 2018-10-23 PROCEDURE — G8427 DOCREV CUR MEDS BY ELIG CLIN: HCPCS | Performed by: PHYSICIAN ASSISTANT

## 2018-10-23 PROCEDURE — 1111F DSCHRG MED/CURRENT MED MERGE: CPT | Performed by: PHYSICIAN ASSISTANT

## 2018-10-23 PROCEDURE — 99213 OFFICE O/P EST LOW 20 MIN: CPT | Performed by: PHYSICIAN ASSISTANT

## 2018-10-23 PROCEDURE — 3017F COLORECTAL CA SCREEN DOC REV: CPT | Performed by: PHYSICIAN ASSISTANT

## 2018-10-23 ASSESSMENT — ENCOUNTER SYMPTOMS
NAUSEA: 0
ABDOMINAL PAIN: 0
CONSTIPATION: 0
DIARRHEA: 0
SHORTNESS OF BREATH: 0
BACK PAIN: 0
VOMITING: 0

## 2018-10-23 NOTE — PATIENT INSTRUCTIONS
Billdarlene Franklin was seen today for follow-up from hospital.    Diagnoses and all orders for this visit:    Kidney stone  -     DC DISCHARGE MEDS RECONCILED W/ CURRENT OUTPATIENT MED LIST    Hydronephrosis of right kidney  -     DC DISCHARGE MEDS RECONCILED W/ CURRENT OUTPATIENT MED LIST       Call with any concerns, f/u with urology as directed.

## 2018-10-25 ENCOUNTER — HOSPITAL ENCOUNTER (OUTPATIENT)
Dept: ULTRASOUND IMAGING | Age: 68
Discharge: HOME | End: 2018-10-25
Payer: MEDICARE

## 2018-10-25 DIAGNOSIS — N17.9 AKI (ACUTE KIDNEY INJURY) (HCC): Primary | ICD-10-CM

## 2018-10-25 DIAGNOSIS — N20.1 CALCULUS OF URETER: ICD-10-CM

## 2018-10-25 PROCEDURE — 76770 US EXAM ABDO BACK WALL COMP: CPT

## 2018-10-26 ENCOUNTER — OFFICE VISIT (OUTPATIENT)
Dept: VASCULAR SURGERY | Age: 68
End: 2018-10-26
Payer: MEDICARE

## 2018-10-26 VITALS
DIASTOLIC BLOOD PRESSURE: 86 MMHG | SYSTOLIC BLOOD PRESSURE: 130 MMHG | BODY MASS INDEX: 28.45 KG/M2 | HEIGHT: 66 IN | HEART RATE: 76 BPM | WEIGHT: 177 LBS

## 2018-10-26 DIAGNOSIS — I80.01 THROMBOPHLEBITIS OF SUPERFICIAL VEINS OF RIGHT LOWER EXTREMITY: Primary | ICD-10-CM

## 2018-10-26 DIAGNOSIS — I83.93 ASYMPTOMATIC VARICOSE VEINS OF BOTH LOWER EXTREMITIES: ICD-10-CM

## 2018-10-26 PROCEDURE — 1123F ACP DISCUSS/DSCN MKR DOCD: CPT | Performed by: SURGERY

## 2018-10-26 PROCEDURE — G8427 DOCREV CUR MEDS BY ELIG CLIN: HCPCS | Performed by: SURGERY

## 2018-10-26 PROCEDURE — 1090F PRES/ABSN URINE INCON ASSESS: CPT | Performed by: SURGERY

## 2018-10-26 PROCEDURE — 1111F DSCHRG MED/CURRENT MED MERGE: CPT | Performed by: SURGERY

## 2018-10-26 PROCEDURE — G8482 FLU IMMUNIZE ORDER/ADMIN: HCPCS | Performed by: SURGERY

## 2018-10-26 PROCEDURE — 1101F PT FALLS ASSESS-DOCD LE1/YR: CPT | Performed by: SURGERY

## 2018-10-26 PROCEDURE — 1036F TOBACCO NON-USER: CPT | Performed by: SURGERY

## 2018-10-26 PROCEDURE — G8399 PT W/DXA RESULTS DOCUMENT: HCPCS | Performed by: SURGERY

## 2018-10-26 PROCEDURE — 4040F PNEUMOC VAC/ADMIN/RCVD: CPT | Performed by: SURGERY

## 2018-10-26 PROCEDURE — 99212 OFFICE O/P EST SF 10 MIN: CPT | Performed by: SURGERY

## 2018-10-26 PROCEDURE — G8417 CALC BMI ABV UP PARAM F/U: HCPCS | Performed by: SURGERY

## 2018-10-26 PROCEDURE — 3017F COLORECTAL CA SCREEN DOC REV: CPT | Performed by: SURGERY

## 2018-10-30 DIAGNOSIS — N17.9 AKI (ACUTE KIDNEY INJURY) (HCC): ICD-10-CM

## 2018-10-30 DIAGNOSIS — R73.9 HYPERGLYCEMIA: ICD-10-CM

## 2018-10-30 LAB — URIC ACID, SERUM: 6.8 MG/DL (ref 2.6–6)

## 2018-10-31 DIAGNOSIS — E79.0 ELEVATED BLOOD URIC ACID LEVEL: Primary | ICD-10-CM

## 2018-10-31 LAB
ESTIMATED AVERAGE GLUCOSE: 128.4 MG/DL
HBA1C MFR BLD: 6.1 %

## 2018-11-01 ENCOUNTER — NURSE ONLY (OUTPATIENT)
Dept: FAMILY MEDICINE CLINIC | Age: 68
End: 2018-11-01
Payer: MEDICARE

## 2018-11-01 DIAGNOSIS — R31.9 HEMATURIA, UNSPECIFIED TYPE: Primary | ICD-10-CM

## 2018-11-01 LAB
BACTERIA URINE, POC: 0
BILIRUBIN URINE: 0 MG/DL
BLOOD, URINE: POSITIVE
CASTS URINE, POC: 0
CLARITY: CLEAR
COLOR: YELLOW
CRYSTALS URINE, POC: 0
EPI CELLS URINE, POC: 0
GLUCOSE URINE: 0
KETONES, URINE: NEGATIVE
LEUKOCYTE EST, POC: ABNORMAL
NITRITE, URINE: NEGATIVE
PH UA: 5 (ref 4.5–8)
PROTEIN UA: POSITIVE
RBC URINE, POC: ABNORMAL
SPECIFIC GRAVITY UA: 1.02 (ref 1–1.03)
UROBILINOGEN, URINE: NORMAL
WBC URINE, POC: ABNORMAL
YEAST URINE, POC: 0

## 2018-11-01 PROCEDURE — 81000 URINALYSIS NONAUTO W/SCOPE: CPT | Performed by: PHYSICIAN ASSISTANT

## 2019-03-11 ENCOUNTER — OFFICE VISIT (OUTPATIENT)
Dept: DERMATOLOGY | Age: 69
End: 2019-03-11
Payer: MEDICARE

## 2019-03-11 DIAGNOSIS — D22.9 MULTIPLE NEVI: Primary | ICD-10-CM

## 2019-03-11 DIAGNOSIS — Z85.828 HISTORY OF NONMELANOMA SKIN CANCER: ICD-10-CM

## 2019-03-11 DIAGNOSIS — L82.1 SEBORRHEIC KERATOSIS: ICD-10-CM

## 2019-03-11 DIAGNOSIS — L30.9 DERMATITIS: ICD-10-CM

## 2019-03-11 PROCEDURE — 99214 OFFICE O/P EST MOD 30 MIN: CPT | Performed by: DERMATOLOGY

## 2019-03-11 PROCEDURE — G8399 PT W/DXA RESULTS DOCUMENT: HCPCS | Performed by: DERMATOLOGY

## 2019-03-11 PROCEDURE — 1123F ACP DISCUSS/DSCN MKR DOCD: CPT | Performed by: DERMATOLOGY

## 2019-03-11 PROCEDURE — 4040F PNEUMOC VAC/ADMIN/RCVD: CPT | Performed by: DERMATOLOGY

## 2019-03-11 PROCEDURE — 1036F TOBACCO NON-USER: CPT | Performed by: DERMATOLOGY

## 2019-03-11 PROCEDURE — 1090F PRES/ABSN URINE INCON ASSESS: CPT | Performed by: DERMATOLOGY

## 2019-03-11 PROCEDURE — G8427 DOCREV CUR MEDS BY ELIG CLIN: HCPCS | Performed by: DERMATOLOGY

## 2019-03-11 PROCEDURE — 3017F COLORECTAL CA SCREEN DOC REV: CPT | Performed by: DERMATOLOGY

## 2019-03-11 PROCEDURE — 1101F PT FALLS ASSESS-DOCD LE1/YR: CPT | Performed by: DERMATOLOGY

## 2019-03-11 PROCEDURE — G8482 FLU IMMUNIZE ORDER/ADMIN: HCPCS | Performed by: DERMATOLOGY

## 2019-03-11 PROCEDURE — G8417 CALC BMI ABV UP PARAM F/U: HCPCS | Performed by: DERMATOLOGY

## 2019-04-26 ENCOUNTER — TELEPHONE (OUTPATIENT)
Dept: DERMATOLOGY | Age: 69
End: 2019-04-26

## 2019-05-06 ENCOUNTER — NURSE ONLY (OUTPATIENT)
Dept: DERMATOLOGY | Age: 69
End: 2019-05-06

## 2019-05-06 DIAGNOSIS — E66.9 OBESITY (BMI 30-39.9): Primary | ICD-10-CM

## 2019-05-06 NOTE — PROGRESS NOTES
Patient here today for application of 36 Trutest Allergy Patch Testing. Patient advise no showering until next appointment on Wednesday 05/08/19.

## 2019-05-08 ENCOUNTER — NURSE ONLY (OUTPATIENT)
Dept: DERMATOLOGY | Age: 69
End: 2019-05-08

## 2019-05-08 DIAGNOSIS — Z01.82 ENCOUNTER FOR ALLERGY TESTING: Primary | ICD-10-CM

## 2019-05-08 PROCEDURE — 99024 POSTOP FOLLOW-UP VISIT: CPT | Performed by: DERMATOLOGY

## 2019-05-08 NOTE — PROGRESS NOTES
Patch testing was performed on [facility] using standard technique. Tests were read after 48 hours. Interpretation: 14-Epoxy resin +1, 18-Quartermiun-15 +1, 20-Formaldehyde +1, 27-Tixocortol +1 and 28-Gold Sodium thiosulfate +1? Test Results       Test Administration Information        Patient advised to limit showering and exercise until appointment on Monday 5/13/2019 with Dr. Rosa Elena Prescott. Patient given handouts on all positive reactions.

## 2019-05-13 ENCOUNTER — OFFICE VISIT (OUTPATIENT)
Dept: DERMATOLOGY | Age: 69
End: 2019-05-13
Payer: MEDICARE

## 2019-05-13 DIAGNOSIS — L30.9 DERMATITIS: Primary | ICD-10-CM

## 2019-05-13 PROCEDURE — 99213 OFFICE O/P EST LOW 20 MIN: CPT | Performed by: DERMATOLOGY

## 2019-05-13 PROCEDURE — G8417 CALC BMI ABV UP PARAM F/U: HCPCS | Performed by: DERMATOLOGY

## 2019-05-13 PROCEDURE — 3017F COLORECTAL CA SCREEN DOC REV: CPT | Performed by: DERMATOLOGY

## 2019-05-13 PROCEDURE — G8399 PT W/DXA RESULTS DOCUMENT: HCPCS | Performed by: DERMATOLOGY

## 2019-05-13 PROCEDURE — 4040F PNEUMOC VAC/ADMIN/RCVD: CPT | Performed by: DERMATOLOGY

## 2019-05-13 PROCEDURE — 1123F ACP DISCUSS/DSCN MKR DOCD: CPT | Performed by: DERMATOLOGY

## 2019-05-13 PROCEDURE — G8427 DOCREV CUR MEDS BY ELIG CLIN: HCPCS | Performed by: DERMATOLOGY

## 2019-05-13 PROCEDURE — 1036F TOBACCO NON-USER: CPT | Performed by: DERMATOLOGY

## 2019-05-13 PROCEDURE — 1090F PRES/ABSN URINE INCON ASSESS: CPT | Performed by: DERMATOLOGY

## 2019-05-13 RX ORDER — DESOXIMETASONE 0.5 MG/G
CREAM TOPICAL
Qty: 60 G | Refills: 1 | Status: SHIPPED | OUTPATIENT
Start: 2019-05-13 | End: 2019-05-28

## 2019-05-15 ENCOUNTER — TELEPHONE (OUTPATIENT)
Dept: DERMATOLOGY | Age: 69
End: 2019-05-15

## 2019-05-15 NOTE — TELEPHONE ENCOUNTER
Pt c/b [phone]  Pt states:   - medication desoximetasone 0.05% cream cost after insurance $110.00   - is asking for an alternate  Wes Sewell0 [phone]  Please call to discuss thanks

## 2019-05-28 ENCOUNTER — OFFICE VISIT (OUTPATIENT)
Dept: FAMILY MEDICINE CLINIC | Age: 69
End: 2019-05-28
Payer: MEDICARE

## 2019-05-28 VITALS
TEMPERATURE: 97.8 F | OXYGEN SATURATION: 98 % | BODY MASS INDEX: 28.08 KG/M2 | HEART RATE: 72 BPM | SYSTOLIC BLOOD PRESSURE: 148 MMHG | WEIGHT: 174 LBS | DIASTOLIC BLOOD PRESSURE: 104 MMHG

## 2019-05-28 DIAGNOSIS — I10 ESSENTIAL HYPERTENSION: Primary | ICD-10-CM

## 2019-05-28 DIAGNOSIS — R73.9 HYPERGLYCEMIA: ICD-10-CM

## 2019-05-28 DIAGNOSIS — E78.5 HYPERLIPIDEMIA, UNSPECIFIED HYPERLIPIDEMIA TYPE: ICD-10-CM

## 2019-05-28 PROCEDURE — G8427 DOCREV CUR MEDS BY ELIG CLIN: HCPCS | Performed by: FAMILY MEDICINE

## 2019-05-28 PROCEDURE — G8399 PT W/DXA RESULTS DOCUMENT: HCPCS | Performed by: FAMILY MEDICINE

## 2019-05-28 PROCEDURE — 4040F PNEUMOC VAC/ADMIN/RCVD: CPT | Performed by: FAMILY MEDICINE

## 2019-05-28 PROCEDURE — 1036F TOBACCO NON-USER: CPT | Performed by: FAMILY MEDICINE

## 2019-05-28 PROCEDURE — 1090F PRES/ABSN URINE INCON ASSESS: CPT | Performed by: FAMILY MEDICINE

## 2019-05-28 PROCEDURE — 3017F COLORECTAL CA SCREEN DOC REV: CPT | Performed by: FAMILY MEDICINE

## 2019-05-28 PROCEDURE — 1123F ACP DISCUSS/DSCN MKR DOCD: CPT | Performed by: FAMILY MEDICINE

## 2019-05-28 PROCEDURE — 99213 OFFICE O/P EST LOW 20 MIN: CPT | Performed by: FAMILY MEDICINE

## 2019-05-28 PROCEDURE — G8417 CALC BMI ABV UP PARAM F/U: HCPCS | Performed by: FAMILY MEDICINE

## 2019-05-28 RX ORDER — AMLODIPINE BESYLATE 5 MG/1
5 TABLET ORAL DAILY
Qty: 30 TABLET | Refills: 5 | Status: SHIPPED | OUTPATIENT
Start: 2019-05-28 | End: 2019-08-19 | Stop reason: SDUPTHER

## 2019-05-28 ASSESSMENT — ENCOUNTER SYMPTOMS
SHORTNESS OF BREATH: 0
FOREIGN BODY SENSATION: 0
DOUBLE VISION: 0
EYE ITCHING: 0
PHOTOPHOBIA: 0
NAUSEA: 0
EYE REDNESS: 1

## 2019-05-28 NOTE — PROGRESS NOTES
SUBJECTIVE:    Danika Smith is a 70 y.o. female who presents for a follow up visit. Chief Complaint   Patient presents with    Hypertension     B/P has been elevated, highest has been around 157/103. Has had a headache in the back of her head for the past 3 weeks. Also, has blood shot eye, right. Hypertension   This is a chronic problem. The current episode started more than 1 year ago. The problem has been gradually worsening since onset. The problem is controlled. Associated symptoms include headaches and malaise/fatigue. Pertinent negatives include no chest pain, neck pain, palpitations, peripheral edema or shortness of breath. Risk factors for coronary artery disease include sedentary lifestyle. Past treatments include ACE inhibitors. The current treatment provides moderate improvement. Compliance problems include exercise. Eye Problem    The right eye is affected. This is a new problem. The current episode started in the past 7 days. The problem occurs constantly. The problem has been unchanged. The injury mechanism was contact lenses. The patient is experiencing no pain. There is no known exposure to pink eye. She wears contacts. Associated symptoms include eye redness. Pertinent negatives include no double vision, fever, foreign body sensation, itching, nausea or photophobia. She has tried nothing for the symptoms. Patient's medications, allergies, past medical,surgical, social and family histories were reviewed and updated as appropriate. Past Medical History:   Diagnosis Date    Asymptomatic varicose veins     Family history of malignant neoplasm of gastrointestinal tract     Hypertension      Past Surgical History:   Procedure Laterality Date    FINGER AMPUTATION      left middle    HYSTERECTOMY      OTHER SURGICAL HISTORY      uterine prolapse     Family History   Problem Relation Age of Onset    Cancer Mother         lung.  colon, skin    High Blood Pressure Father cerebral aneurysm    Cancer Maternal Grandmother     Cancer Maternal Grandfather     High Cholesterol Paternal Grandmother     Stroke Paternal Grandmother     High Cholesterol Paternal Grandfather     Stroke Paternal Grandfather      Social History     Socioeconomic History    Marital status:      Spouse name: Not on file    Number of children: Not on file    Years of education: Not on file    Highest education level: Not on file   Occupational History    Not on file   Social Needs    Financial resource strain: Not on file    Food insecurity:     Worry: Not on file     Inability: Not on file    Transportation needs:     Medical: Not on file     Non-medical: Not on file   Tobacco Use    Smoking status: Never Smoker    Smokeless tobacco: Never Used   Substance and Sexual Activity    Alcohol use:  Yes     Alcohol/week: 0.0 oz     Comment: occ    Drug use: No    Sexual activity: Yes     Partners: Male   Lifestyle    Physical activity:     Days per week: Not on file     Minutes per session: Not on file    Stress: Not on file   Relationships    Social connections:     Talks on phone: Not on file     Gets together: Not on file     Attends Rastafari service: Not on file     Active member of club or organization: Not on file     Attends meetings of clubs or organizations: Not on file     Relationship status: Not on file    Intimate partner violence:     Fear of current or ex partner: Not on file     Emotionally abused: Not on file     Physically abused: Not on file     Forced sexual activity: Not on file   Other Topics Concern    Not on file   Social History Narrative    Not on file     Allergies   Allergen Reactions    Bactrim Rash     Current Outpatient Medications   Medication Sig Dispense Refill    amLODIPine (NORVASC) 5 MG tablet Take 1 tablet by mouth daily 30 tablet 5    Compression Stockings MISC by Does not apply route 1 each 0    Calcium Carbonate-Vitamin D (CALCIUM-D) 600-400 MG-UNIT TABS Take by mouth 2 times daily      lisinopril (PRINIVIL;ZESTRIL) 40 MG tablet Take 1 tablet by mouth daily 90 tablet 3    triamcinolone (KENALOG) 0.1 % ointment Apply to affected area BID PRN flares 30 g 1     No current facility-administered medications for this visit. Review of Systems   Constitutional: Positive for malaise/fatigue. Negative for fever. Eyes: Positive for redness. Negative for double vision, photophobia and itching. Respiratory: Negative for shortness of breath. Cardiovascular: Negative for chest pain and palpitations. Gastrointestinal: Negative for nausea. Musculoskeletal: Negative for neck pain. Neurological: Positive for headaches. OBJECTIVE:    BP (!) 148/104   Pulse 72   Temp 97.8 °F (36.6 °C)   Wt 174 lb (78.9 kg)   SpO2 98%   BMI 28.08 kg/m²    Physical Exam   Constitutional: She is oriented to person, place, and time. She appears well-developed and well-nourished. HENT:   Head: Normocephalic and atraumatic. Right Ear: External ear normal.   Left Ear: External ear normal.   Nose: Nose normal.   Mouth/Throat: Oropharynx is clear and moist.   Eyes: Conjunctivae and EOM are normal. Right eye exhibits no discharge. Neck: Normal range of motion. Neck supple. No JVD present. No tracheal deviation present. No thyromegaly present. Cardiovascular: Normal rate, regular rhythm and normal heart sounds. Pulmonary/Chest: Effort normal and breath sounds normal. No respiratory distress. She has no rales. Lymphadenopathy:     She has no cervical adenopathy. Neurological: She is alert and oriented to person, place, and time. Skin: Skin is warm and dry. Psychiatric: She has a normal mood and affect. ASSESSMENT/PLAN:    Glenys Quesada was seen today for hypertension. Diagnoses and all orders for this visit:    Essential hypertension  -     amLODIPine (NORVASC) 5 MG tablet;  Take 1 tablet by mouth daily    Hyperlipidemia, unspecified hyperlipidemia type  -     Comprehensive Metabolic Panel, Fasting; Future  -     CBC Auto Differential; Future  -     Lipid, Fasting; Future  -     TSH with Reflex; Future    Hyperglycemia  -     Hemoglobin A1C; Future        Return in about 3 weeks (around 6/18/2019). Please note portions of this note were completed with a voicerecognition program.  Efforts were made to edit the dictations but occasionally words are mis-transcribed.

## 2019-06-26 ENCOUNTER — OFFICE VISIT (OUTPATIENT)
Dept: FAMILY MEDICINE CLINIC | Age: 69
End: 2019-06-26
Payer: MEDICARE

## 2019-06-26 VITALS
HEART RATE: 96 BPM | SYSTOLIC BLOOD PRESSURE: 120 MMHG | WEIGHT: 175 LBS | OXYGEN SATURATION: 98 % | DIASTOLIC BLOOD PRESSURE: 80 MMHG | BODY MASS INDEX: 28.25 KG/M2

## 2019-06-26 DIAGNOSIS — E78.2 MIXED HYPERLIPIDEMIA: Primary | ICD-10-CM

## 2019-06-26 DIAGNOSIS — R73.9 HYPERGLYCEMIA: ICD-10-CM

## 2019-06-26 DIAGNOSIS — I10 ESSENTIAL HYPERTENSION: ICD-10-CM

## 2019-06-26 PROCEDURE — G8417 CALC BMI ABV UP PARAM F/U: HCPCS | Performed by: FAMILY MEDICINE

## 2019-06-26 PROCEDURE — G8427 DOCREV CUR MEDS BY ELIG CLIN: HCPCS | Performed by: FAMILY MEDICINE

## 2019-06-26 PROCEDURE — G8399 PT W/DXA RESULTS DOCUMENT: HCPCS | Performed by: FAMILY MEDICINE

## 2019-06-26 PROCEDURE — 99214 OFFICE O/P EST MOD 30 MIN: CPT | Performed by: FAMILY MEDICINE

## 2019-06-26 PROCEDURE — 1036F TOBACCO NON-USER: CPT | Performed by: FAMILY MEDICINE

## 2019-06-26 PROCEDURE — 3017F COLORECTAL CA SCREEN DOC REV: CPT | Performed by: FAMILY MEDICINE

## 2019-06-26 PROCEDURE — 1123F ACP DISCUSS/DSCN MKR DOCD: CPT | Performed by: FAMILY MEDICINE

## 2019-06-26 PROCEDURE — 4040F PNEUMOC VAC/ADMIN/RCVD: CPT | Performed by: FAMILY MEDICINE

## 2019-06-26 PROCEDURE — 1090F PRES/ABSN URINE INCON ASSESS: CPT | Performed by: FAMILY MEDICINE

## 2019-06-26 ASSESSMENT — ENCOUNTER SYMPTOMS
CONSTIPATION: 0
DIARRHEA: 0
RHINORRHEA: 0
SHORTNESS OF BREATH: 0
CHEST TIGHTNESS: 0

## 2019-06-26 NOTE — PROGRESS NOTES
OTHER SURGICAL HISTORY      uterine prolapse     Family History   Problem Relation Age of Onset    Cancer Mother         lung. colon, skin    High Blood Pressure Father         cerebral aneurysm    Cancer Maternal Grandmother     Cancer Maternal Grandfather     High Cholesterol Paternal Grandmother     Stroke Paternal Grandmother     High Cholesterol Paternal Grandfather     Stroke Paternal Grandfather      Social History     Socioeconomic History    Marital status:      Spouse name: Not on file    Number of children: Not on file    Years of education: Not on file    Highest education level: Not on file   Occupational History    Not on file   Social Needs    Financial resource strain: Not on file    Food insecurity:     Worry: Not on file     Inability: Not on file    Transportation needs:     Medical: Not on file     Non-medical: Not on file   Tobacco Use    Smoking status: Never Smoker    Smokeless tobacco: Never Used   Substance and Sexual Activity    Alcohol use:  Yes     Alcohol/week: 0.0 oz     Comment: occ    Drug use: No    Sexual activity: Yes     Partners: Male   Lifestyle    Physical activity:     Days per week: Not on file     Minutes per session: Not on file    Stress: Not on file   Relationships    Social connections:     Talks on phone: Not on file     Gets together: Not on file     Attends Taoist service: Not on file     Active member of club or organization: Not on file     Attends meetings of clubs or organizations: Not on file     Relationship status: Not on file    Intimate partner violence:     Fear of current or ex partner: Not on file     Emotionally abused: Not on file     Physically abused: Not on file     Forced sexual activity: Not on file   Other Topics Concern    Not on file   Social History Narrative    Not on file     Allergies   Allergen Reactions    Bactrim Rash     Current Outpatient Medications   Medication Sig Dispense Refill    amLODIPine (NORVASC) 5 MG tablet Take 1 tablet by mouth daily 30 tablet 5    Compression Stockings MISC by Does not apply route 1 each 0    Calcium Carbonate-Vitamin D (CALCIUM-D) 600-400 MG-UNIT TABS Take by mouth 2 times daily      lisinopril (PRINIVIL;ZESTRIL) 40 MG tablet Take 1 tablet by mouth daily 90 tablet 3    triamcinolone (KENALOG) 0.1 % ointment Apply to affected area BID PRN flares 30 g 1     No current facility-administered medications for this visit. Review of Systems   Constitutional: Negative for activity change, fatigue and unexpected weight change. HENT: Negative for congestion, postnasal drip and rhinorrhea. Respiratory: Negative for chest tightness and shortness of breath. Cardiovascular: Negative for chest pain. Gastrointestinal: Negative for constipation and diarrhea. Genitourinary: Negative for difficulty urinating. Musculoskeletal: Negative for arthralgias. Neurological: Positive for headaches. Negative for dizziness. Psychiatric/Behavioral: Negative for dysphoric mood and sleep disturbance. OBJECTIVE:    /80   Pulse 96   Wt 175 lb (79.4 kg)   SpO2 98%   BMI 28.25 kg/m²    Physical Exam   Constitutional: She is oriented to person, place, and time. She appears well-developed and well-nourished. HENT:   Head: Normocephalic and atraumatic. Right Ear: External ear normal.   Left Ear: External ear normal.   Mouth/Throat: Oropharynx is clear and moist.   Eyes: Conjunctivae and EOM are normal.   Neck: Normal range of motion. Neck supple. No JVD present. No tracheal deviation present. No thyromegaly present. Cardiovascular: Normal rate, regular rhythm and normal heart sounds. Pulmonary/Chest: Effort normal and breath sounds normal. No respiratory distress. She has no rales. Lymphadenopathy:     She has no cervical adenopathy. Neurological: She is alert and oriented to person, place, and time. Skin: Skin is warm and dry.    Psychiatric: She has a normal mood and affect. ASSESSMENT/PLAN:    Noah García was seen today for follow-up. Diagnoses and all orders for this visit:    Mixed hyperlipidemia  -     Comprehensive Metabolic Panel, Fasting; Future  -     CBC Auto Differential; Future  -     Lipid, Fasting; Future    Hyperglycemia    Essential hypertension    Encouraged exercise and low carbohydrate diet. Recommended omega-3 fish oil 2 capsules once daily. Return in about 6 months (around 12/26/2019). Please note portions of this note were completed with a voicerecognition program.  Efforts were made to edit the dictations but occasionally words are mis-transcribed.

## 2019-08-19 DIAGNOSIS — I10 ESSENTIAL HYPERTENSION: ICD-10-CM

## 2019-08-19 RX ORDER — AMLODIPINE BESYLATE 5 MG/1
5 TABLET ORAL DAILY
Qty: 90 TABLET | Refills: 1 | Status: SHIPPED | OUTPATIENT
Start: 2019-08-19 | End: 2019-11-05 | Stop reason: SDUPTHER

## 2019-08-19 RX ORDER — LISINOPRIL 40 MG/1
40 TABLET ORAL DAILY
Qty: 90 TABLET | Refills: 1 | Status: SHIPPED | OUTPATIENT
Start: 2019-08-19 | End: 2019-11-05 | Stop reason: SDUPTHER

## 2019-11-05 DIAGNOSIS — I10 ESSENTIAL HYPERTENSION: ICD-10-CM

## 2019-11-05 RX ORDER — AMLODIPINE BESYLATE 5 MG/1
5 TABLET ORAL DAILY
Qty: 90 TABLET | Refills: 1 | Status: SHIPPED | OUTPATIENT
Start: 2019-11-05 | End: 2019-12-31 | Stop reason: SDUPTHER

## 2019-11-05 RX ORDER — LISINOPRIL 40 MG/1
40 TABLET ORAL DAILY
Qty: 90 TABLET | Refills: 1 | Status: SHIPPED | OUTPATIENT
Start: 2019-11-05 | End: 2019-12-31 | Stop reason: SDUPTHER

## 2019-12-31 ENCOUNTER — OFFICE VISIT (OUTPATIENT)
Dept: FAMILY MEDICINE CLINIC | Age: 69
End: 2019-12-31
Payer: MEDICARE

## 2019-12-31 VITALS
HEART RATE: 73 BPM | WEIGHT: 172 LBS | BODY MASS INDEX: 27.76 KG/M2 | SYSTOLIC BLOOD PRESSURE: 128 MMHG | DIASTOLIC BLOOD PRESSURE: 80 MMHG | OXYGEN SATURATION: 98 %

## 2019-12-31 DIAGNOSIS — E78.2 MIXED HYPERLIPIDEMIA: ICD-10-CM

## 2019-12-31 DIAGNOSIS — I10 ESSENTIAL HYPERTENSION: ICD-10-CM

## 2019-12-31 DIAGNOSIS — G44.86 CERVICOGENIC HEADACHE: Primary | ICD-10-CM

## 2019-12-31 PROCEDURE — G8399 PT W/DXA RESULTS DOCUMENT: HCPCS | Performed by: FAMILY MEDICINE

## 2019-12-31 PROCEDURE — G8417 CALC BMI ABV UP PARAM F/U: HCPCS | Performed by: FAMILY MEDICINE

## 2019-12-31 PROCEDURE — 1090F PRES/ABSN URINE INCON ASSESS: CPT | Performed by: FAMILY MEDICINE

## 2019-12-31 PROCEDURE — 3017F COLORECTAL CA SCREEN DOC REV: CPT | Performed by: FAMILY MEDICINE

## 2019-12-31 PROCEDURE — 99214 OFFICE O/P EST MOD 30 MIN: CPT | Performed by: FAMILY MEDICINE

## 2019-12-31 PROCEDURE — G8482 FLU IMMUNIZE ORDER/ADMIN: HCPCS | Performed by: FAMILY MEDICINE

## 2019-12-31 PROCEDURE — 1123F ACP DISCUSS/DSCN MKR DOCD: CPT | Performed by: FAMILY MEDICINE

## 2019-12-31 PROCEDURE — G8427 DOCREV CUR MEDS BY ELIG CLIN: HCPCS | Performed by: FAMILY MEDICINE

## 2019-12-31 PROCEDURE — 1036F TOBACCO NON-USER: CPT | Performed by: FAMILY MEDICINE

## 2019-12-31 PROCEDURE — 4040F PNEUMOC VAC/ADMIN/RCVD: CPT | Performed by: FAMILY MEDICINE

## 2019-12-31 RX ORDER — AMLODIPINE BESYLATE 5 MG/1
5 TABLET ORAL DAILY
Qty: 90 TABLET | Refills: 1 | Status: SHIPPED | OUTPATIENT
Start: 2019-12-31 | End: 2020-05-26

## 2019-12-31 RX ORDER — LISINOPRIL 40 MG/1
40 TABLET ORAL DAILY
Qty: 90 TABLET | Refills: 1 | Status: SHIPPED | OUTPATIENT
Start: 2019-12-31 | End: 2020-05-26

## 2019-12-31 RX ORDER — CHLORAL HYDRATE 500 MG
1000 CAPSULE ORAL 2 TIMES DAILY
COMMUNITY

## 2020-01-14 ENCOUNTER — HOSPITAL ENCOUNTER (OUTPATIENT)
Dept: MRI IMAGING | Age: 70
Discharge: HOME | End: 2020-01-14
Payer: MEDICARE

## 2020-01-14 PROCEDURE — A9579 GAD-BASE MR CONTRAST NOS,1ML: HCPCS | Performed by: FAMILY MEDICINE

## 2020-01-14 PROCEDURE — 70553 MRI BRAIN STEM W/O & W/DYE: CPT

## 2020-01-14 PROCEDURE — 6360000004 HC RX CONTRAST MEDICATION: Performed by: FAMILY MEDICINE

## 2020-01-14 RX ADMIN — GADOTERIDOL 16 ML: 279.3 INJECTION, SOLUTION INTRAVENOUS at 13:42

## 2020-01-17 ENCOUNTER — TELEPHONE (OUTPATIENT)
Dept: FAMILY MEDICINE CLINIC | Age: 70
End: 2020-01-17

## 2020-01-17 NOTE — TELEPHONE ENCOUNTER
Pt. Is calling to speak with Amilcar Quintero regarding a referral for [facility] , they need a diagnosis before seeing her and she doesn't have one.  Please advise

## 2020-01-21 ENCOUNTER — TELEPHONE (OUTPATIENT)
Dept: ADMINISTRATIVE | Age: 70
End: 2020-01-21

## 2020-02-06 ENCOUNTER — HOSPITAL ENCOUNTER (OUTPATIENT)
Dept: CT IMAGING | Age: 70
Discharge: HOME | End: 2020-02-06
Payer: MEDICARE

## 2020-02-06 PROCEDURE — 70450 CT HEAD/BRAIN W/O DYE: CPT

## 2020-03-02 ENCOUNTER — PATIENT MESSAGE (OUTPATIENT)
Dept: FAMILY MEDICINE CLINIC | Age: 70
End: 2020-03-02

## 2020-03-02 NOTE — TELEPHONE ENCOUNTER
From: Lan Meza  To: Mary Kate Bennett MD  Sent: 3/2/2020 11:28 AM EST  Subject: Non-Urgent Medical Question    On 2020, I received the first of two shots for the Shingles vaccine, ([facility] (1-pk) kit, at [facility], [address]. I will get the second shot in two months. Please update my medical records. If you need proof of this, please let me know what you need. Thanks,  Gunjan Juarez.  Starla Sierra  : [phone]  [address] [place] [address]

## 2020-03-12 ENCOUNTER — OFFICE VISIT (OUTPATIENT)
Dept: DERMATOLOGY | Age: 70
End: 2020-03-12
Payer: MEDICARE

## 2020-03-12 PROCEDURE — 4040F PNEUMOC VAC/ADMIN/RCVD: CPT | Performed by: DERMATOLOGY

## 2020-03-12 PROCEDURE — 1036F TOBACCO NON-USER: CPT | Performed by: DERMATOLOGY

## 2020-03-12 PROCEDURE — G8482 FLU IMMUNIZE ORDER/ADMIN: HCPCS | Performed by: DERMATOLOGY

## 2020-03-12 PROCEDURE — 99214 OFFICE O/P EST MOD 30 MIN: CPT | Performed by: DERMATOLOGY

## 2020-03-12 PROCEDURE — 11102 TANGNTL BX SKIN SINGLE LES: CPT | Performed by: DERMATOLOGY

## 2020-03-12 PROCEDURE — G8417 CALC BMI ABV UP PARAM F/U: HCPCS | Performed by: DERMATOLOGY

## 2020-03-12 PROCEDURE — G8399 PT W/DXA RESULTS DOCUMENT: HCPCS | Performed by: DERMATOLOGY

## 2020-03-12 PROCEDURE — 1123F ACP DISCUSS/DSCN MKR DOCD: CPT | Performed by: DERMATOLOGY

## 2020-03-12 PROCEDURE — G8427 DOCREV CUR MEDS BY ELIG CLIN: HCPCS | Performed by: DERMATOLOGY

## 2020-03-12 PROCEDURE — 3017F COLORECTAL CA SCREEN DOC REV: CPT | Performed by: DERMATOLOGY

## 2020-03-12 PROCEDURE — 1090F PRES/ABSN URINE INCON ASSESS: CPT | Performed by: DERMATOLOGY

## 2020-03-12 NOTE — PROGRESS NOTES
care.   Skin cleansed with alcohol pad and site anesthetized with lido + epi. Aluminum chloride applied to site for hemostasis. Petrolatum ointment and bandage applied. Specimen bottle labeled with patient information and site and specimen sent to [facility]. 4. Facial dermatitis - unclear etiology, better currently, ?  Contact component but TRUE test +'s don't necessarily clinically correlate since TAC has helped  - OK to resume TAC oint intermittently prn worsening flares; ed se/misuse  - consider elidel/protopic/eucrisa if needing more consistent steroid use

## 2020-03-18 LAB — DERMATOLOGY PATHOLOGY REPORT: NORMAL

## 2020-05-04 ENCOUNTER — HOSPITAL ENCOUNTER (OUTPATIENT)
Dept: MRI IMAGING | Age: 70
Discharge: HOME | End: 2020-05-04
Payer: MEDICARE

## 2020-05-04 PROCEDURE — 70553 MRI BRAIN STEM W/O & W/DYE: CPT

## 2020-05-04 PROCEDURE — 6360000004 HC RX CONTRAST MEDICATION: Performed by: NEUROLOGICAL SURGERY

## 2020-05-04 PROCEDURE — A9579 GAD-BASE MR CONTRAST NOS,1ML: HCPCS | Performed by: NEUROLOGICAL SURGERY

## 2020-05-04 RX ADMIN — GADOTERIDOL 15 ML: 279.3 INJECTION, SOLUTION INTRAVENOUS at 13:59

## 2020-05-26 RX ORDER — AMLODIPINE BESYLATE 5 MG/1
5 TABLET ORAL DAILY
Qty: 90 TABLET | Refills: 1 | Status: SHIPPED | OUTPATIENT
Start: 2020-05-26 | End: 2020-06-30 | Stop reason: SDUPTHER

## 2020-05-26 RX ORDER — LISINOPRIL 40 MG/1
40 TABLET ORAL DAILY
Qty: 90 TABLET | Refills: 1 | Status: SHIPPED | OUTPATIENT
Start: 2020-05-26 | End: 2020-06-30 | Stop reason: SDUPTHER

## 2020-06-30 ENCOUNTER — OFFICE VISIT (OUTPATIENT)
Dept: FAMILY MEDICINE CLINIC | Age: 70
End: 2020-06-30
Payer: MEDICARE

## 2020-06-30 VITALS
DIASTOLIC BLOOD PRESSURE: 80 MMHG | BODY MASS INDEX: 27.76 KG/M2 | SYSTOLIC BLOOD PRESSURE: 130 MMHG | TEMPERATURE: 98.4 F | WEIGHT: 172 LBS

## 2020-06-30 PROCEDURE — 1090F PRES/ABSN URINE INCON ASSESS: CPT | Performed by: FAMILY MEDICINE

## 2020-06-30 PROCEDURE — 1036F TOBACCO NON-USER: CPT | Performed by: FAMILY MEDICINE

## 2020-06-30 PROCEDURE — G8417 CALC BMI ABV UP PARAM F/U: HCPCS | Performed by: FAMILY MEDICINE

## 2020-06-30 PROCEDURE — G8399 PT W/DXA RESULTS DOCUMENT: HCPCS | Performed by: FAMILY MEDICINE

## 2020-06-30 PROCEDURE — 3017F COLORECTAL CA SCREEN DOC REV: CPT | Performed by: FAMILY MEDICINE

## 2020-06-30 PROCEDURE — 4040F PNEUMOC VAC/ADMIN/RCVD: CPT | Performed by: FAMILY MEDICINE

## 2020-06-30 PROCEDURE — 99214 OFFICE O/P EST MOD 30 MIN: CPT | Performed by: FAMILY MEDICINE

## 2020-06-30 PROCEDURE — 1123F ACP DISCUSS/DSCN MKR DOCD: CPT | Performed by: FAMILY MEDICINE

## 2020-06-30 PROCEDURE — G8427 DOCREV CUR MEDS BY ELIG CLIN: HCPCS | Performed by: FAMILY MEDICINE

## 2020-06-30 RX ORDER — LISINOPRIL 40 MG/1
40 TABLET ORAL DAILY
Qty: 90 TABLET | Refills: 3 | Status: SHIPPED | OUTPATIENT
Start: 2020-06-30 | End: 2021-07-01 | Stop reason: SDUPTHER

## 2020-06-30 RX ORDER — AMLODIPINE BESYLATE 5 MG/1
5 TABLET ORAL DAILY
Qty: 90 TABLET | Refills: 3 | Status: SHIPPED | OUTPATIENT
Start: 2020-06-30 | End: 2021-07-01 | Stop reason: SDUPTHER

## 2020-06-30 ASSESSMENT — ENCOUNTER SYMPTOMS
BLURRED VISION: 0
CHEST TIGHTNESS: 0
DIARRHEA: 0
WHEEZING: 0
CONSTIPATION: 0
BACK PAIN: 0
SHORTNESS OF BREATH: 0
ABDOMINAL PAIN: 0

## 2020-06-30 NOTE — PROGRESS NOTES
updated as appropriate. Past Medical History:   Diagnosis Date    Asymptomatic varicose veins     Family history of malignant neoplasm of gastrointestinal tract     Hyperlipidemia     Hypertension      Past Surgical History:   Procedure Laterality Date    FINGER AMPUTATION      left middle    HYSTERECTOMY      OTHER SURGICAL HISTORY      uterine prolapse     Family History   Problem Relation Age of Onset    Cancer Mother         lung. colon, skin    High Blood Pressure Father         cerebral aneurysm    Cancer Maternal Grandmother     Cancer Maternal Grandfather     High Cholesterol Paternal Grandmother     Stroke Paternal Grandmother     High Cholesterol Paternal Grandfather     Stroke Paternal Grandfather      Social History     Tobacco Use    Smoking status: Never Smoker    Smokeless tobacco: Never Used   Substance Use Topics    Alcohol use: Yes     Alcohol/week: 0.0 standard drinks     Comment: occ      Allergies   Allergen Reactions    Bactrim Rash     Current Outpatient Medications on File Prior to Visit   Medication Sig Dispense Refill    triamcinolone (KENALOG) 0.1 % ointment Apply to affected area BID PRN flares 30 g 1    Omega-3 Fatty Acids (FISH OIL) 1000 MG CAPS Take 1,000 mg by mouth 2 times daily      Compression Stockings MISC by Does not apply route 1 each 0    Calcium Carbonate-Vitamin D (CALCIUM-D) 600-400 MG-UNIT TABS Take by mouth 2 times daily       No current facility-administered medications on file prior to visit. Review of Systems   Constitutional: Negative for activity change and unexpected weight change. Eyes: Negative for blurred vision. Respiratory: Negative for chest tightness, shortness of breath and wheezing. Cardiovascular: Negative for chest pain. Gastrointestinal: Negative for abdominal pain, constipation and diarrhea. Musculoskeletal: Negative for arthralgias and back pain. Neurological: Positive for headaches.

## 2020-12-07 ENCOUNTER — HOSPITAL ENCOUNTER (OUTPATIENT)
Dept: MRI IMAGING | Age: 70
Discharge: HOME | End: 2020-12-07
Payer: MEDICARE

## 2020-12-07 PROCEDURE — 70553 MRI BRAIN STEM W/O & W/DYE: CPT

## 2020-12-07 PROCEDURE — 6360000004 HC RX CONTRAST MEDICATION: Performed by: NEUROLOGICAL SURGERY

## 2020-12-07 PROCEDURE — A9579 GAD-BASE MR CONTRAST NOS,1ML: HCPCS | Performed by: NEUROLOGICAL SURGERY

## 2020-12-07 RX ADMIN — GADOTERIDOL 16 ML: 279.3 INJECTION, SOLUTION INTRAVENOUS at 16:50

## 2020-12-31 ENCOUNTER — OFFICE VISIT (OUTPATIENT)
Dept: FAMILY MEDICINE CLINIC | Age: 70
End: 2020-12-31
Payer: MEDICARE

## 2020-12-31 VITALS
TEMPERATURE: 96.9 F | DIASTOLIC BLOOD PRESSURE: 80 MMHG | BODY MASS INDEX: 27.92 KG/M2 | WEIGHT: 173 LBS | SYSTOLIC BLOOD PRESSURE: 136 MMHG

## 2020-12-31 PROCEDURE — G8417 CALC BMI ABV UP PARAM F/U: HCPCS | Performed by: FAMILY MEDICINE

## 2020-12-31 PROCEDURE — G8510 SCR DEP NEG, NO PLAN REQD: HCPCS | Performed by: FAMILY MEDICINE

## 2020-12-31 PROCEDURE — 3017F COLORECTAL CA SCREEN DOC REV: CPT | Performed by: FAMILY MEDICINE

## 2020-12-31 PROCEDURE — 1036F TOBACCO NON-USER: CPT | Performed by: FAMILY MEDICINE

## 2020-12-31 PROCEDURE — 1123F ACP DISCUSS/DSCN MKR DOCD: CPT | Performed by: FAMILY MEDICINE

## 2020-12-31 PROCEDURE — G8399 PT W/DXA RESULTS DOCUMENT: HCPCS | Performed by: FAMILY MEDICINE

## 2020-12-31 PROCEDURE — 4040F PNEUMOC VAC/ADMIN/RCVD: CPT | Performed by: FAMILY MEDICINE

## 2020-12-31 PROCEDURE — 1090F PRES/ABSN URINE INCON ASSESS: CPT | Performed by: FAMILY MEDICINE

## 2020-12-31 PROCEDURE — G8427 DOCREV CUR MEDS BY ELIG CLIN: HCPCS | Performed by: FAMILY MEDICINE

## 2020-12-31 PROCEDURE — 3288F FALL RISK ASSESSMENT DOCD: CPT | Performed by: FAMILY MEDICINE

## 2020-12-31 PROCEDURE — 99214 OFFICE O/P EST MOD 30 MIN: CPT | Performed by: FAMILY MEDICINE

## 2020-12-31 PROCEDURE — G8484 FLU IMMUNIZE NO ADMIN: HCPCS | Performed by: FAMILY MEDICINE

## 2020-12-31 ASSESSMENT — PATIENT HEALTH QUESTIONNAIRE - PHQ9
2. FEELING DOWN, DEPRESSED OR HOPELESS: 0
SUM OF ALL RESPONSES TO PHQ QUESTIONS 1-9: 0
SUM OF ALL RESPONSES TO PHQ9 QUESTIONS 1 & 2: 0
1. LITTLE INTEREST OR PLEASURE IN DOING THINGS: 0

## 2020-12-31 NOTE — PROGRESS NOTES
This is a chronic problem. The current episode started more than 1 year ago. The problem is controlled. Associated symptoms include headaches. Pertinent negatives include no blurred vision, chest pain, neck pain or shortness of breath. Risk factors for coronary artery disease include dyslipidemia, sedentary lifestyle and post-menopausal state. Past treatments include ACE inhibitors and calcium channel blockers. The current treatment provides significant improvement. Compliance problems include exercise and diet. Patient's medications, allergies, past medical,surgical, social and family histories were reviewed and updated as appropriate. Past Medical History:   Diagnosis Date    Asymptomatic varicose veins     Family history of malignant neoplasm of gastrointestinal tract     Hyperlipidemia     Hypertension      Past Surgical History:   Procedure Laterality Date    FINGER AMPUTATION      left middle    HYSTERECTOMY      OTHER SURGICAL HISTORY      uterine prolapse     Family History   Problem Relation Age of Onset    Cancer Mother         lung. colon, skin    High Blood Pressure Father         cerebral aneurysm    Cancer Maternal Grandmother     Cancer Maternal Grandfather     High Cholesterol Paternal Grandmother     Stroke Paternal Grandmother     High Cholesterol Paternal Grandfather     Stroke Paternal Grandfather      Social History     Tobacco Use    Smoking status: Never Smoker    Smokeless tobacco: Never Used   Substance Use Topics    Alcohol use:  Yes     Alcohol/week: 0.0 standard drinks     Comment: occ      Allergies   Allergen Reactions    Bactrim Rash     Current Outpatient Medications on File Prior to Visit   Medication Sig Dispense Refill    amLODIPine (NORVASC) 5 MG tablet Take 1 tablet by mouth daily 90 tablet 3    lisinopril (PRINIVIL;ZESTRIL) 40 MG tablet Take 1 tablet by mouth daily 90 tablet 3  triamcinolone (KENALOG) 0.1 % ointment Apply to affected area BID PRN flares 30 g 1    Omega-3 Fatty Acids (FISH OIL) 1000 MG CAPS Take 1,000 mg by mouth 2 times daily      Compression Stockings MISC by Does not apply route 1 each 0    Calcium Carbonate-Vitamin D (CALCIUM-D) 600-400 MG-UNIT TABS Take by mouth 2 times daily       No current facility-administered medications on file prior to visit. Review of Systems   Constitutional: Negative for activity change, fatigue and unexpected weight change. HENT: Negative for congestion, postnasal drip and rhinorrhea. Eyes: Negative for blurred vision. Respiratory: Negative for chest tightness and shortness of breath. Cardiovascular: Negative for chest pain. Gastrointestinal: Negative for constipation, diarrhea and nausea. Genitourinary: Negative for difficulty urinating. Musculoskeletal: Negative for neck pain. Neurological: Positive for headaches. Negative for dizziness and loss of balance. Psychiatric/Behavioral: Negative for dysphoric mood and sleep disturbance. The patient is not nervous/anxious. OBJECTIVE:    /80   Temp 96.9 °F (36.1 °C)   Wt 173 lb (78.5 kg)   BMI 27.92 kg/m²    Physical Exam  Constitutional:       Appearance: She is well-developed. HENT:      Head: Normocephalic and atraumatic. Right Ear: Tympanic membrane and external ear normal.      Left Ear: Tympanic membrane and external ear normal.      Nose: Nose normal.      Mouth/Throat:      Mouth: Mucous membranes are moist.      Pharynx: No posterior oropharyngeal erythema. Eyes:      General:         Right eye: No discharge. Conjunctiva/sclera: Conjunctivae normal.   Neck:      Musculoskeletal: Normal range of motion and neck supple. Thyroid: No thyromegaly. Vascular: No JVD. Trachea: No tracheal deviation. Cardiovascular:      Rate and Rhythm: Normal rate and regular rhythm. Heart sounds: Normal heart sounds. Pulmonary:      Effort: Pulmonary effort is normal. No respiratory distress. Breath sounds: Normal breath sounds. No rales. Lymphadenopathy:      Cervical: No cervical adenopathy. Skin:     General: Skin is warm and dry. Neurological:      Mental Status: She is alert and oriented to person, place, and time. Psychiatric:         Mood and Affect: Mood normal.         Behavior: Behavior normal.         ASSESSMENT/PLAN:    Lexus Bagley was seen today for follow-up. Diagnoses and all orders for this visit:    Essential hypertension  In good control on current medication    Mixed hyperlipidemia  With the elevated triglycerides patient will try to be more compliant with the omega-3 fish oil. -     Comprehensive Metabolic Panel, Fasting; Future  -     CBC Auto Differential; Future  -     Lipid, Fasting; Future    Hyperglycemia  Most recent glucose was 103      Return in about 6 months (around 6/30/2021). Please note portions of this note were completed with a voicerecognition program.  Efforts were made to edit the dictations but occasionally words are mis-transcribed.

## 2021-02-12 RX ORDER — CIPROFLOXACIN 250 MG/1
250 TABLET, FILM COATED ORAL 2 TIMES DAILY
Qty: 14 TABLET | Refills: 0 | Status: SHIPPED | OUTPATIENT
Start: 2021-02-12 | End: 2021-02-19

## 2021-02-12 NOTE — TELEPHONE ENCOUNTER
Patient called with concerns that she has a UTI and would like to know if you can call in an anti-biotic for her. She has frequent urination and burning.     She recently tested positive for COVID and can't come in      Please give her a callback    [phone]

## 2021-03-04 ENCOUNTER — NURSE TRIAGE (OUTPATIENT)
Dept: OTHER | Facility: CLINIC | Age: 71
End: 2021-03-04

## 2021-03-04 NOTE — TELEPHONE ENCOUNTER
Answer Assessment - Initial Assessment Questions  1. REASON FOR CALL or QUESTION: \"What is your reason for calling today? \" or \"How can I best help you? \" or \"What question do you have that I can help answer? \"      Pt and spouse had covid in feb wanted to know when the could schedule their covid vaccine. Info given from [facility]: If you were treated for COVID-19 with monoclonal antibodies or convalescent plasma, you should wait 90 days before getting a COVID-19 vaccine. Pt also told if they were no longer in quarantine or having symptoms they could schedule their vaccination (cdc checklist info:deferred until the person has recovered from the acute  illness (if the person had symptoms) and criteria have been met for them to discontinue isolation) . Protocols used: INFORMATION ONLY CALL - NO TRIAGE-ADULT-    Call received on [address]. No triage, info only    Care advice provided. Recommended using local department of health website for testing locations and up to date information. Caller verbalizes understanding, denies any other questions or concerns; instructed to call back for any new or worsening symptoms.

## 2021-03-09 ENCOUNTER — NURSE TRIAGE (OUTPATIENT)
Dept: OTHER | Facility: CLINIC | Age: 71
End: 2021-03-09

## 2021-03-23 ENCOUNTER — OFFICE VISIT (OUTPATIENT)
Dept: DERMATOLOGY | Age: 71
End: 2021-03-23
Payer: MEDICARE

## 2021-03-23 VITALS — TEMPERATURE: 97.9 F

## 2021-03-23 DIAGNOSIS — L82.1 SEBORRHEIC KERATOSIS: ICD-10-CM

## 2021-03-23 DIAGNOSIS — D48.5 NEOPLASM OF UNCERTAIN BEHAVIOR OF SKIN: ICD-10-CM

## 2021-03-23 DIAGNOSIS — Z85.828 HISTORY OF NONMELANOMA SKIN CANCER: ICD-10-CM

## 2021-03-23 DIAGNOSIS — L30.9 HAND DERMATITIS: ICD-10-CM

## 2021-03-23 DIAGNOSIS — L30.9 DERMATITIS: ICD-10-CM

## 2021-03-23 DIAGNOSIS — D22.9 MULTIPLE NEVI: Primary | ICD-10-CM

## 2021-03-23 DIAGNOSIS — L57.0 AK (ACTINIC KERATOSIS): ICD-10-CM

## 2021-03-23 PROCEDURE — 11102 TANGNTL BX SKIN SINGLE LES: CPT | Performed by: DERMATOLOGY

## 2021-03-23 PROCEDURE — G8417 CALC BMI ABV UP PARAM F/U: HCPCS | Performed by: DERMATOLOGY

## 2021-03-23 PROCEDURE — 4040F PNEUMOC VAC/ADMIN/RCVD: CPT | Performed by: DERMATOLOGY

## 2021-03-23 PROCEDURE — G8484 FLU IMMUNIZE NO ADMIN: HCPCS | Performed by: DERMATOLOGY

## 2021-03-23 PROCEDURE — 1036F TOBACCO NON-USER: CPT | Performed by: DERMATOLOGY

## 2021-03-23 PROCEDURE — G8427 DOCREV CUR MEDS BY ELIG CLIN: HCPCS | Performed by: DERMATOLOGY

## 2021-03-23 PROCEDURE — 1090F PRES/ABSN URINE INCON ASSESS: CPT | Performed by: DERMATOLOGY

## 2021-03-23 PROCEDURE — 99214 OFFICE O/P EST MOD 30 MIN: CPT | Performed by: DERMATOLOGY

## 2021-03-23 PROCEDURE — 3017F COLORECTAL CA SCREEN DOC REV: CPT | Performed by: DERMATOLOGY

## 2021-03-23 PROCEDURE — G8399 PT W/DXA RESULTS DOCUMENT: HCPCS | Performed by: DERMATOLOGY

## 2021-03-23 PROCEDURE — 17000 DESTRUCT PREMALG LESION: CPT | Performed by: DERMATOLOGY

## 2021-03-23 PROCEDURE — 1123F ACP DISCUSS/DSCN MKR DOCD: CPT | Performed by: DERMATOLOGY

## 2021-03-23 RX ORDER — NORTRIPTYLINE HYDROCHLORIDE 10 MG/1
CAPSULE ORAL
COMMUNITY
Start: 2020-12-31 | End: 2021-07-01

## 2021-03-23 NOTE — PATIENT INSTRUCTIONS
on top of your wound. WOUND CARE:    You may shower or bathe as usual, but avoid scrubbing the areas that have been frozen.  Cleanse the site twice a day with mild soapy water, and then apply a thin film of white petrolatum (Vaseline©).  You do not need to cover the area, but can if you prefer.  Do NOT allow the site to become dry or crusted, or attempt to dry it out with rubbing alcohol or hydrogen peroxide.  Continue this regimen until the area is pink and healed. Depending on the size and location of your cryosurgery site, healing may take 2 to 4 weeks.  The area may continue to be pink for several weeks, and over the next few months may become darker or lighter than the surrounding skin. This may be a permanent change. Protecting Yourself From the Sun    · Apply an over-the-counter broad spectrum water resistant sunscreen with an SPF of at least 30 to exposed areas of the skin. Dont forget the ears and lips! Remember to reapply sunscreen about every 2 hours and after swimming or sweating. · Wear sun protective clothing. Swim shirts (aka. rash guards) are a great idea and negates the need to reapply sunscreen in those areas.      · Seek the shade whenever possible especially between the hours of 10 am and 4 pm when the suns rays are the strongest.     · Avoid tanning beds  ·

## 2021-03-23 NOTE — PROGRESS NOTES
Former[facility]  Radha Williamson MD  [phone]      August Dave  1950    69 y.o. female     Date of Visit: 3/23/2021    Last seen: 3-2020    Chief Complaint: eczema, moles  Chief Complaint   Patient presents with    Skin Exam     FSe       HX: multi nevi     History of Present Illness:    1. Here for evaluation of multiple asx pigmented lesions on the trunk and extremities, present for many years; no change in size/shape/color of any lesions; no bleeding lesions. 2. Concerning brown lesion on the L shoulder.       3. Hx of NMSC - R medial canthal area - BCC many years ago. No probs with this site and no other new concerns except as noted below. 4. F/u AK - central upper chest - bx 3-2020. No probs since. New lesion on the R temple. Asx.     5, 6. F/u for facial dermatitis - better from baseline but flaring recently on the FH and cheeks - she attributes to mask use. Hands dry and inflamed recently too with washing more. Changed cleansers after patch testing and minimizing makeup. s/p TRUE test patch testing in 2019    Panel 18 (quaternium-15) with +  Panel 21 (formaldehye) with +  Panel 27 (tixocortol-21-pivalate) with ++    *Pruritic erythematous eruption on mainly face + upper chest and upper back - flared initially over summer 2017 and required prednisone in Sept 2017, cleared. Improved but then flared again  Improved bt didn't clear with subsequent prednisone. Doesn't use soap on the face. Uses [employer] products - changed to [employer] products ~ 1 year ago and hasn't used recently. *Bx;d 3-2018 - read as subacute dermatitis w/ eos  *cleared with TAC after bx's and has had intermittent flares since last seen but much milder and clear with TAC  *still unsure of trigger    + family hx of skin cancer - NMSC    Review of Systems:  Gen: Feels well, good sense of health. Skin: No changing moles or lesions. No new rashes.   HEENT: no mouth sores    Past Medical History, Family History, Surgical History, Medications and Allergies reviewed. Outpatient Medications Marked as Taking for the 3/23/21 encounter (Office Visit) with Kg Larson MD   Medication Sig Dispense Refill    nortriptyline (PAMELOR) 10 MG capsule       amLODIPine (NORVASC) 5 MG tablet Take 1 tablet by mouth daily 90 tablet 3    lisinopril (PRINIVIL;ZESTRIL) 40 MG tablet Take 1 tablet by mouth daily 90 tablet 3    triamcinolone (KENALOG) 0.1 % ointment Apply to affected area BID PRN flares 30 g 1    Omega-3 Fatty Acids (FISH OIL) 1000 MG CAPS Take 1,000 mg by mouth 2 times daily      Calcium Carbonate-Vitamin D (CALCIUM-D) 600-400 MG-UNIT TABS Take by mouth 2 times daily       Allergies   Allergen Reactions    Bactrim Rash       Past Medical History:   Diagnosis Date    Asymptomatic varicose veins     Family history of malignant neoplasm of gastrointestinal tract     Hyperlipidemia     Hypertension      Past Surgical History:   Procedure Laterality Date    FINGER AMPUTATION      left middle    HYSTERECTOMY      OTHER SURGICAL HISTORY      uterine prolapse       Physical Examination     Gen, well-appearing  The following were examined and determined to be normal: Psych/Neuro, Scalp/hair, chest, Conjunctivae/eyelids, Gums/teeth/lips, Neck, Abdomen, Back, RUE, LUE, RLE, LLE, Nails/digits and  buttocks. The following were examined and determined to be abnormal:  face    Face clear  Scalp, hands/periungal area normal    trunk and extremities with scattered brown macules and papules   R medial canthal area clear    Central upper chest with scar - clear  R temple with erythematous roughly scaled macule    FH, near brows, cheeks with erythematous edmatous patches/thin plaques  Hands with dry erythematous skin on the dorsum and webspaces            Has photos of more scaly erythematous edematous patches on the face with flares. Assessment and Plan     1.  Benign-appearing nevi, SK's  - educ re si/sx/ABCD's of MM   educ sun protection - OTC sunscreen with SPF 30-50+ recommended and reviewed usage  encouraged skin check yearly (sooner if indicated), self checks    2. L shoulder - r/o lentigo vs LM  - Shave biopsy performed after verbal consent obtained. Patient educated regarding risk of bleeding, infection, scar and educated on wound care. Skin cleansed with alcohol pad and site anesthetized with lido + epi. Aluminum chloride applied to site for hemostasis. Petrolatum ointment and bandage applied. Specimen bottle labeled with patient information and site and specimen sent to dermpath. 3. Hx of NMSC, no signs recurrence  - educ re ABCD's of MM   educ sun protection as above  encouraged skin check yearly (sooner if indicated), self checks    4. AK - central upper chest - bx 3-2020 - clear  *1 new on the R upper temple  - lesion(s)  treated with liquid nitrogen x 2 cycles. Patient educated on risk of blister, hypopigmentation/scar and wound care. 5. Facial dermatitis - unclear etiology, better previously but worsening with recent mask use  - ? Contact component but TRUE test +'s don't necessarily clinically correlate since TAC has helped  - resume TAC oint intermittently prn worsening flares; ed se/misuse and limited use  - consider elidel/protopic/eucrisa if needing more consistent steroid use  - re-eval if no improvement    6.  Hand dermatitis - from washing  - TAC oint bid until clear and prn flares; ed se/misuse

## 2021-03-25 LAB — DERMATOLOGY PATHOLOGY REPORT: NORMAL

## 2021-07-01 ENCOUNTER — OFFICE VISIT (OUTPATIENT)
Dept: FAMILY MEDICINE CLINIC | Age: 71
End: 2021-07-01
Payer: MEDICARE

## 2021-07-01 VITALS
DIASTOLIC BLOOD PRESSURE: 80 MMHG | SYSTOLIC BLOOD PRESSURE: 118 MMHG | TEMPERATURE: 96 F | HEIGHT: 66 IN | BODY MASS INDEX: 27.64 KG/M2 | WEIGHT: 172 LBS

## 2021-07-01 DIAGNOSIS — I10 ESSENTIAL HYPERTENSION: ICD-10-CM

## 2021-07-01 DIAGNOSIS — E78.2 MIXED HYPERLIPIDEMIA: Primary | ICD-10-CM

## 2021-07-01 PROCEDURE — G8417 CALC BMI ABV UP PARAM F/U: HCPCS | Performed by: FAMILY MEDICINE

## 2021-07-01 PROCEDURE — 1090F PRES/ABSN URINE INCON ASSESS: CPT | Performed by: FAMILY MEDICINE

## 2021-07-01 PROCEDURE — 3017F COLORECTAL CA SCREEN DOC REV: CPT | Performed by: FAMILY MEDICINE

## 2021-07-01 PROCEDURE — G8427 DOCREV CUR MEDS BY ELIG CLIN: HCPCS | Performed by: FAMILY MEDICINE

## 2021-07-01 PROCEDURE — 1123F ACP DISCUSS/DSCN MKR DOCD: CPT | Performed by: FAMILY MEDICINE

## 2021-07-01 PROCEDURE — 99214 OFFICE O/P EST MOD 30 MIN: CPT | Performed by: FAMILY MEDICINE

## 2021-07-01 PROCEDURE — 4040F PNEUMOC VAC/ADMIN/RCVD: CPT | Performed by: FAMILY MEDICINE

## 2021-07-01 PROCEDURE — G8399 PT W/DXA RESULTS DOCUMENT: HCPCS | Performed by: FAMILY MEDICINE

## 2021-07-01 PROCEDURE — 1036F TOBACCO NON-USER: CPT | Performed by: FAMILY MEDICINE

## 2021-07-01 RX ORDER — AMLODIPINE BESYLATE 5 MG/1
5 TABLET ORAL DAILY
Qty: 90 TABLET | Refills: 3 | Status: SHIPPED | OUTPATIENT
Start: 2021-07-01 | End: 2022-07-07 | Stop reason: SDUPTHER

## 2021-07-01 RX ORDER — LISINOPRIL 40 MG/1
40 TABLET ORAL DAILY
Qty: 90 TABLET | Refills: 3 | Status: SHIPPED | OUTPATIENT
Start: 2021-07-01 | End: 2022-07-07 | Stop reason: SDUPTHER

## 2021-07-01 ASSESSMENT — ENCOUNTER SYMPTOMS
CONSTIPATION: 0
CHEST TIGHTNESS: 0
WHEEZING: 0
BACK PAIN: 0
DIARRHEA: 0
SHORTNESS OF BREATH: 0
RHINORRHEA: 0

## 2021-07-01 NOTE — PROGRESS NOTES
SUBJECTIVE:    Alwin Cogan is a 69 y.o. female who presents for a follow up visit. Chief Complaint   Patient presents with    Follow-up     Patient is her for a follow up. Discuss lab. C/o right knee pain, \"tweaked\" it playing tennis a couple weeks ago. Hyperlipidemia  This is a chronic problem. The current episode started more than 1 year ago. Lipid results: Total cholesterol 197, triglycerides 147, HDL 42, . Pertinent negatives include no chest pain or shortness of breath. Treatments tried: Omega-3 fish oil. Compliance problems include adherence to diet and adherence to exercise. Risk factors for coronary artery disease include hypertension, a sedentary lifestyle and post-menopausal.   Hypertension  This is a chronic problem. The current episode started more than 1 year ago. The problem is controlled. Associated symptoms include headaches (chronic). Pertinent negatives include no chest pain, palpitations or shortness of breath. Risk factors for coronary artery disease include dyslipidemia, obesity, sedentary lifestyle and post-menopausal state. Past treatments include calcium channel blockers and ACE inhibitors. The current treatment provides significant improvement. Compliance problems include exercise and diet. Knee Pain   The incident occurred more than 1 week ago. Incident location: Playing tennis. Felt slight pain but continued to play. The pain is present in the right knee. The pain has been fluctuating since onset. She has tried nothing for the symptoms. Patient's medications, allergies, past medical,surgical, social and family histories were reviewed and updated as appropriate.      Past Medical History:   Diagnosis Date    Asymptomatic varicose veins     Family history of malignant neoplasm of gastrointestinal tract     Hyperlipidemia     Hypertension      Past Surgical History:   Procedure Laterality Date    FINGER AMPUTATION      left middle    HYSTERECTOMY  OTHER SURGICAL HISTORY      uterine prolapse     Family History   Problem Relation Age of Onset    Cancer Mother         lung. colon, skin    High Blood Pressure Father         cerebral aneurysm    Cancer Maternal Grandmother     Cancer Maternal Grandfather     High Cholesterol Paternal Grandmother     Stroke Paternal Grandmother     High Cholesterol Paternal Grandfather     Stroke Paternal Grandfather      Social History     Tobacco Use    Smoking status: Never Smoker    Smokeless tobacco: Never Used   Substance Use Topics    Alcohol use: Yes     Alcohol/week: 0.0 standard drinks     Comment: occ      Allergies   Allergen Reactions    Bactrim Rash     Current Outpatient Medications on File Prior to Visit   Medication Sig Dispense Refill    triamcinolone (KENALOG) 0.1 % ointment Apply to affected area BID PRN flares 30 g 1    Omega-3 Fatty Acids (FISH OIL) 1000 MG CAPS Take 1,000 mg by mouth 2 times daily      Calcium Carbonate-Vitamin D (CALCIUM-D) 600-400 MG-UNIT TABS Take by mouth 2 times daily       No current facility-administered medications on file prior to visit. Review of Systems   Constitutional: Negative for activity change, fatigue and unexpected weight change. HENT: Negative for congestion, postnasal drip and rhinorrhea. Respiratory: Negative for chest tightness, shortness of breath and wheezing. Cardiovascular: Negative for chest pain and palpitations. Gastrointestinal: Negative for constipation and diarrhea. Genitourinary: Negative for difficulty urinating. Musculoskeletal: Positive for arthralgias ( Right knee). Negative for back pain. Neurological: Positive for headaches (chronic). Negative for dizziness and light-headedness. Psychiatric/Behavioral: Negative for dysphoric mood and sleep disturbance. The patient is not nervous/anxious.         OBJECTIVE:    /80   Temp 96 °F (35.6 °C)   Ht 5' 6\" (1.676 m)   Wt 172 lb (78 kg)   BMI 27.76 kg/m² Physical Exam  Constitutional:       Appearance: Normal appearance. She is well-developed. HENT:      Head: Normocephalic and atraumatic. Right Ear: Tympanic membrane and external ear normal.      Left Ear: Tympanic membrane and external ear normal.      Nose: Nose normal.      Mouth/Throat:      Mouth: Mucous membranes are moist.      Pharynx: No posterior oropharyngeal erythema. Eyes:      General:         Right eye: No discharge. Conjunctiva/sclera: Conjunctivae normal.   Neck:      Thyroid: No thyromegaly. Vascular: No JVD. Trachea: No tracheal deviation. Cardiovascular:      Rate and Rhythm: Normal rate and regular rhythm. Heart sounds: Normal heart sounds. Pulmonary:      Effort: Pulmonary effort is normal. No respiratory distress. Breath sounds: Normal breath sounds. No rales. Musculoskeletal:      Cervical back: Normal range of motion and neck supple. Right lower leg: No edema. Left lower leg: No edema. Lymphadenopathy:      Cervical: No cervical adenopathy. Skin:     General: Skin is warm and dry. Neurological:      Mental Status: She is alert and oriented to person, place, and time. Psychiatric:         Mood and Affect: Mood normal.         Behavior: Behavior normal.         ASSESSMENT/PLAN:    Debbi Juarez was seen today for follow-up. Diagnoses and all orders for this visit:    Mixed hyperlipidemia  LDL now 129. Patient on no statin. She does have risk factors of hypertension as well. She is given information on a coronary calcium score. This will help determine if she needs to start a statin. -     Comprehensive Metabolic Panel, Fasting; Future  -     Lipid, Fasting; Future  -     TSH with Reflex; Future    Essential hypertension  -     amLODIPine (NORVASC) 5 MG tablet; Take 1 tablet by mouth daily  -     lisinopril (PRINIVIL;ZESTRIL) 40 MG tablet; Take 1 tablet by mouth daily        Return in about 6 months (around 1/1/2022).     Please note portions of this note were completed with a voicerecognition program.  Efforts were made to edit the dictations but occasionally words are mis-transcribed.

## 2021-12-27 ENCOUNTER — HOSPITAL ENCOUNTER (OUTPATIENT)
Dept: MRI IMAGING | Age: 71
Discharge: HOME | End: 2021-12-27
Payer: MEDICARE

## 2021-12-27 DIAGNOSIS — D32.9 MENINGIOMA (HCC): ICD-10-CM

## 2021-12-27 PROCEDURE — 70553 MRI BRAIN STEM W/O & W/DYE: CPT

## 2021-12-27 PROCEDURE — A9579 GAD-BASE MR CONTRAST NOS,1ML: HCPCS | Performed by: NEUROLOGICAL SURGERY

## 2021-12-27 PROCEDURE — 6360000004 HC RX CONTRAST MEDICATION: Performed by: NEUROLOGICAL SURGERY

## 2021-12-27 RX ADMIN — GADOTERIDOL 16 ML: 279.3 INJECTION, SOLUTION INTRAVENOUS at 12:45

## 2022-01-04 ENCOUNTER — TELEPHONE (OUTPATIENT)
Dept: FAMILY MEDICINE CLINIC | Age: 72
End: 2022-01-04

## 2022-01-04 NOTE — TELEPHONE ENCOUNTER
----- Message from Sirisha Stallings sent at 1/3/2022  4:12 PM EST -----  Subject: Appointment Request    Reason for Call: Semi-Routine Skin Problem    QUESTIONS  Type of Appointment? Established Patient  Reason for appointment request? No appointments available during search  Additional Information for Provider? Pt is calling to r/s her appt from   1/4 for rtc/6months/htn. Pt now has a sore on left leg for last few weeks. Pt came in contact with someone with COVID 1 week ago and has to r/s that   appt. No appts available, please contact pt to set up an appt with PCP. I   did cancel appt on 1/4 for pt.  ---------------------------------------------------------------------------  --------------  CALL BACK INFO  What is the best way for the office to contact you? OK to leave message on   voicemail  Preferred Call Back Phone Number? [phone]  ---------------------------------------------------------------------------  --------------  SCRIPT ANSWERS  Relationship to Patient? Self  Have your symptoms changed? Yes  Are you having swelling in your throat or face? No  Are you having difficulty breathing? No  Have the symptoms worsened or spread in the last day? No  Are you having fevers (100.4), chills or sweats? No  Have you recently (14 days) seen a provider for this issue? No  Have you been diagnosed with, awaiting test results for, or told that you   are suspected of having COVID-19 (Coronavirus)? (If patient has tested   negative or was tested as a requirement for work, school, or travel and   not based on symptoms, answer no)? No  Within the past two weeks have you developed any of the following symptoms   (answer no if symptoms have been present longer than 2 weeks or began   more than 2 weeks ago)?  Fever or Chills, Cough, Shortness of breath or   difficulty breathing, Loss of taste or smell, Sore throat, Nasal   congestion, Sneezing or runny nose, Fatigue or generalized body aches   (answer no if pain is specific to a body part e.g. back pain), Diarrhea,   Headache? No  Have you had close contact with someone with COVID-19 in the last 14 days?    Yes

## 2022-01-06 NOTE — PROGRESS NOTES
SUBJECTIVE:    Marky Bernstein is a 66 y.o. female who presents for a follow up visit. Chief Complaint   Patient presents with    Follow-up     Patient is here for a follow up. Discuss lab. Has a red spot on front of  left calf wont heal, x 1 month. Hyperlipidemia  This is a chronic problem. The current episode started more than 1 year ago. Lipid results: Total cholesterol 200, triglycerides 129, HDL 47, . Pertinent negatives include no chest pain or shortness of breath. Treatments tried: Omega-3 fish oil. The current treatment provides mild improvement of lipids. Compliance problems include adherence to exercise and adherence to diet. Risk factors for coronary artery disease include dyslipidemia, hypertension, a sedentary lifestyle and post-menopausal.   Hypertension  This is a chronic problem. The current episode started more than 1 year ago. The problem is controlled. Pertinent negatives include no chest pain, headaches or shortness of breath. Risk factors for coronary artery disease include sedentary lifestyle, post-menopausal state and dyslipidemia. Past treatments include ACE inhibitors and calcium channel blockers. The current treatment provides significant improvement. Compliance problems include exercise and diet. Patient's medications, allergies, past medical,surgical, social and family histories were reviewed and updated as appropriate. Past Medical History:   Diagnosis Date    Asymptomatic varicose veins     Family history of malignant neoplasm of gastrointestinal tract     Hyperlipidemia     Hypertension      Past Surgical History:   Procedure Laterality Date    FINGER AMPUTATION      left middle    HYSTERECTOMY      OTHER SURGICAL HISTORY      uterine prolapse     Family History   Problem Relation Age of Onset    Cancer Mother         lung.  colon, skin    High Blood Pressure Father         cerebral aneurysm    Cancer Maternal Grandmother     Cancer Maternal Grandfather     High Cholesterol Paternal Grandmother     Stroke Paternal Grandmother     High Cholesterol Paternal Grandfather     Stroke Paternal Grandfather      Social History     Tobacco Use    Smoking status: Never Smoker    Smokeless tobacco: Never Used   Substance Use Topics    Alcohol use: Yes     Alcohol/week: 0.0 standard drinks     Comment: occ      Allergies   Allergen Reactions    Bactrim Rash     Current Outpatient Medications on File Prior to Visit   Medication Sig Dispense Refill    amLODIPine (NORVASC) 5 MG tablet Take 1 tablet by mouth daily 90 tablet 3    lisinopril (PRINIVIL;ZESTRIL) 40 MG tablet Take 1 tablet by mouth daily 90 tablet 3    triamcinolone (KENALOG) 0.1 % ointment Apply to affected area BID PRN flares 30 g 1    Omega-3 Fatty Acids (FISH OIL) 1000 MG CAPS Take 1,000 mg by mouth 2 times daily      Calcium Carbonate-Vitamin D (CALCIUM-D) 600-400 MG-UNIT TABS Take by mouth 2 times daily       No current facility-administered medications on file prior to visit. Review of Systems   Constitutional: Negative for activity change, fatigue and unexpected weight change. HENT: Negative for congestion, postnasal drip, rhinorrhea and sinus pain. Respiratory: Negative for chest tightness and shortness of breath. Cardiovascular: Negative for chest pain and leg swelling. Gastrointestinal: Negative for constipation and diarrhea. Genitourinary: Negative for difficulty urinating. Musculoskeletal: Negative for arthralgias and back pain. Neurological: Negative for dizziness, light-headedness and headaches. Psychiatric/Behavioral: Negative for dysphoric mood and sleep disturbance. The patient is not nervous/anxious. OBJECTIVE:    /80   Temp 97.2 °F (36.2 °C)   Wt 170 lb (77.1 kg)   BMI 27.44 kg/m²    Physical Exam  Constitutional:       Appearance: She is well-developed. HENT:      Head: Normocephalic and atraumatic.       Right Ear: External ear normal.      Left Ear: External ear normal.      Nose: Nose normal.   Eyes:      General:         Right eye: No discharge. Conjunctiva/sclera: Conjunctivae normal.   Neck:      Thyroid: No thyromegaly. Vascular: No JVD. Trachea: No tracheal deviation. Cardiovascular:      Rate and Rhythm: Normal rate and regular rhythm. Heart sounds: Normal heart sounds. Pulmonary:      Effort: Pulmonary effort is normal. No respiratory distress. Breath sounds: Normal breath sounds. No rales. Musculoskeletal:      Cervical back: Normal range of motion and neck supple. Lymphadenopathy:      Cervical: No cervical adenopathy. Skin:     General: Skin is warm and dry. Findings: Lesion (raised crusted lesion about 7 to 8 mm) present. Neurological:      Mental Status: She is alert and oriented to person, place, and time. ASSESSMENT/PLAN:    Josh Martinez was seen today for follow-up. Diagnoses and all orders for this visit:    Encounter for screening mammogram for breast cancer  -     [facility] DIGITAL SCREEN W OR WO CAD BILATERAL; Future    Screen for colon cancer  -     [facility] - Иван Grossman MD, Gastroenterology, [facility]    Essential hypertension  Good control on current medications    Mixed hyperlipidemia  LDL is not at goal which should be at least less than 100  -     rosuvastatin (CRESTOR) 5 MG tablet; Take 1 tablet by mouth daily  -     Comprehensive Metabolic Panel, Fasting; Future  -     CBC Auto Differential; Future  -     Lipid, Fasting; Future    Skin lesion  Patient to contact her dermatologist for eval and tx soon    Return in about 6 months (around 7/7/2022). Please note portions of this note were completed with a voicerecognition program.  Efforts were made to edit the dictations but occasionally words are mis-transcribed.

## 2022-01-07 ENCOUNTER — OFFICE VISIT (OUTPATIENT)
Dept: FAMILY MEDICINE CLINIC | Age: 72
End: 2022-01-07
Payer: MEDICARE

## 2022-01-07 VITALS
WEIGHT: 170 LBS | BODY MASS INDEX: 27.44 KG/M2 | SYSTOLIC BLOOD PRESSURE: 118 MMHG | TEMPERATURE: 97.2 F | DIASTOLIC BLOOD PRESSURE: 80 MMHG

## 2022-01-07 DIAGNOSIS — I10 ESSENTIAL HYPERTENSION: ICD-10-CM

## 2022-01-07 DIAGNOSIS — L98.9 SKIN LESION: ICD-10-CM

## 2022-01-07 DIAGNOSIS — Z12.31 ENCOUNTER FOR SCREENING MAMMOGRAM FOR BREAST CANCER: Primary | ICD-10-CM

## 2022-01-07 DIAGNOSIS — E78.2 MIXED HYPERLIPIDEMIA: ICD-10-CM

## 2022-01-07 DIAGNOSIS — Z12.11 SCREEN FOR COLON CANCER: ICD-10-CM

## 2022-01-07 PROCEDURE — G8484 FLU IMMUNIZE NO ADMIN: HCPCS | Performed by: FAMILY MEDICINE

## 2022-01-07 PROCEDURE — G8417 CALC BMI ABV UP PARAM F/U: HCPCS | Performed by: FAMILY MEDICINE

## 2022-01-07 PROCEDURE — 3017F COLORECTAL CA SCREEN DOC REV: CPT | Performed by: FAMILY MEDICINE

## 2022-01-07 PROCEDURE — 1123F ACP DISCUSS/DSCN MKR DOCD: CPT | Performed by: FAMILY MEDICINE

## 2022-01-07 PROCEDURE — 1036F TOBACCO NON-USER: CPT | Performed by: FAMILY MEDICINE

## 2022-01-07 PROCEDURE — 99214 OFFICE O/P EST MOD 30 MIN: CPT | Performed by: FAMILY MEDICINE

## 2022-01-07 PROCEDURE — G8427 DOCREV CUR MEDS BY ELIG CLIN: HCPCS | Performed by: FAMILY MEDICINE

## 2022-01-07 PROCEDURE — 1090F PRES/ABSN URINE INCON ASSESS: CPT | Performed by: FAMILY MEDICINE

## 2022-01-07 PROCEDURE — G8399 PT W/DXA RESULTS DOCUMENT: HCPCS | Performed by: FAMILY MEDICINE

## 2022-01-07 PROCEDURE — 4040F PNEUMOC VAC/ADMIN/RCVD: CPT | Performed by: FAMILY MEDICINE

## 2022-01-07 RX ORDER — ROSUVASTATIN CALCIUM 5 MG/1
5 TABLET, COATED ORAL DAILY
Qty: 90 TABLET | Refills: 3 | Status: SHIPPED | OUTPATIENT
Start: 2022-01-07 | End: 2022-07-07 | Stop reason: SDUPTHER

## 2022-01-07 ASSESSMENT — PATIENT HEALTH QUESTIONNAIRE - PHQ9
SUM OF ALL RESPONSES TO PHQ QUESTIONS 1-9: 0
SUM OF ALL RESPONSES TO PHQ QUESTIONS 1-9: 0
SUM OF ALL RESPONSES TO PHQ9 QUESTIONS 1 & 2: 0
2. FEELING DOWN, DEPRESSED OR HOPELESS: 0
1. LITTLE INTEREST OR PLEASURE IN DOING THINGS: 0
SUM OF ALL RESPONSES TO PHQ QUESTIONS 1-9: 0
SUM OF ALL RESPONSES TO PHQ QUESTIONS 1-9: 0

## 2022-01-07 ASSESSMENT — ENCOUNTER SYMPTOMS
SINUS PAIN: 0
BACK PAIN: 0
RHINORRHEA: 0
CONSTIPATION: 0
SHORTNESS OF BREATH: 0
CHEST TIGHTNESS: 0
DIARRHEA: 0

## 2022-01-13 ENCOUNTER — TELEPHONE (OUTPATIENT)
Dept: DERMATOLOGY | Age: 72
End: 2022-01-13

## 2022-01-13 NOTE — TELEPHONE ENCOUNTER
Dr Demetris Cortez patient  Pt c/b #[phone]  Pt stated  Has a spot on the front of her left leg above her ankle that looks like a scab and the center of it is dark in color and red in color around it. Pt says the spot also is very sore to touch and it has been there for over a month now. Pt seen PCP today and says pcp stated she may need to have the spot cut out. Pt looking to get an appt soon to have area checked.   Please c/b to discuss

## 2022-01-18 ENCOUNTER — OFFICE VISIT (OUTPATIENT)
Dept: DERMATOLOGY | Age: 72
End: 2022-01-18
Payer: MEDICARE

## 2022-01-18 VITALS — TEMPERATURE: 96.9 F

## 2022-01-18 DIAGNOSIS — D48.5 NEOPLASM OF UNCERTAIN BEHAVIOR OF SKIN: ICD-10-CM

## 2022-01-18 DIAGNOSIS — L30.9 HAND DERMATITIS: ICD-10-CM

## 2022-01-18 DIAGNOSIS — Z85.828 HISTORY OF NONMELANOMA SKIN CANCER: ICD-10-CM

## 2022-01-18 DIAGNOSIS — L82.1 SEBORRHEIC KERATOSIS: ICD-10-CM

## 2022-01-18 DIAGNOSIS — L30.9 DERMATITIS: ICD-10-CM

## 2022-01-18 DIAGNOSIS — D22.9 MULTIPLE NEVI: Primary | ICD-10-CM

## 2022-01-18 DIAGNOSIS — L57.0 AK (ACTINIC KERATOSIS): ICD-10-CM

## 2022-01-18 PROCEDURE — 4040F PNEUMOC VAC/ADMIN/RCVD: CPT | Performed by: DERMATOLOGY

## 2022-01-18 PROCEDURE — 1123F ACP DISCUSS/DSCN MKR DOCD: CPT | Performed by: DERMATOLOGY

## 2022-01-18 PROCEDURE — 17000 DESTRUCT PREMALG LESION: CPT | Performed by: DERMATOLOGY

## 2022-01-18 PROCEDURE — 17003 DESTRUCT PREMALG LES 2-14: CPT | Performed by: DERMATOLOGY

## 2022-01-18 PROCEDURE — 99214 OFFICE O/P EST MOD 30 MIN: CPT | Performed by: DERMATOLOGY

## 2022-01-18 PROCEDURE — G8484 FLU IMMUNIZE NO ADMIN: HCPCS | Performed by: DERMATOLOGY

## 2022-01-18 PROCEDURE — G8399 PT W/DXA RESULTS DOCUMENT: HCPCS | Performed by: DERMATOLOGY

## 2022-01-18 PROCEDURE — G8417 CALC BMI ABV UP PARAM F/U: HCPCS | Performed by: DERMATOLOGY

## 2022-01-18 PROCEDURE — 3017F COLORECTAL CA SCREEN DOC REV: CPT | Performed by: DERMATOLOGY

## 2022-01-18 PROCEDURE — G8427 DOCREV CUR MEDS BY ELIG CLIN: HCPCS | Performed by: DERMATOLOGY

## 2022-01-18 PROCEDURE — 1090F PRES/ABSN URINE INCON ASSESS: CPT | Performed by: DERMATOLOGY

## 2022-01-18 PROCEDURE — 11102 TANGNTL BX SKIN SINGLE LES: CPT | Performed by: DERMATOLOGY

## 2022-01-18 PROCEDURE — 1036F TOBACCO NON-USER: CPT | Performed by: DERMATOLOGY

## 2022-01-18 NOTE — PROGRESS NOTES
[facility] Dermatology  Josefa Garcia MD  [phone]      Akua Trujillo  1950    66 y.o. female     Date of Visit: 1/18/2022    Last seen: 3-2021 for FSE    Chief Complaint: lesion  Chief Complaint   Patient presents with    Skin Lesion     spot left shin, painful to touch      History of Present Illness:    1. Lesion growing on the L shin x 1 month, tender. 2. Here for evaluation of multiple asx pigmented lesions on the trunk and extremities, present for many years; no change in size/shape/color of any lesions; no bleeding lesions.     3. Hx of NMSC - R medial canthal area - BCC many years ago. No probs with this site and no other new concerns except as noted below. 4. F/u AK - central upper chest - bx 3-2020. No probs since. Few new on the right forearm. 5, 6. F/u for facial dermatitis - better from baseline but flaring last year on the FH and cheeks - she attributes to mask use. Better recently though. And has not needed much in terms of treatment. Hands dry and inflamed recently too with washing more. Has triamcinolone to use at home as needed flares. Mild recently. Changed cleansers after patch testing and minimizing makeup. s/p TRUE test patch testing in 2019    Panel 18 (quaternium-15) with +  Panel 21 (formaldehye) with +  Panel 27 (tixocortol-21-pivalate) with ++    *Pruritic erythematous eruption on mainly face + upper chest and upper back - flared initially over summer 2017 and required prednisone in Sept 2017, cleared. Improved but then flared again  Improved bt didn't clear with subsequent prednisone. Doesn't use soap on the face. Uses Oracio Heady products - changed to [employer]  products ~ 1 year ago and hasn't used recently.     *Bx;d 3-2018 - read as subacute dermatitis w/ eos  *cleared with TAC after bx's and has had intermittent flares since last seen but much milder and clear with TAC  *still unsure of trigger    + family hx of skin cancer - NMSC    Review of Systems:  Gen: Feels well, good sense of health. Skin: No changing moles or lesions. No new rashes. HEENT: no mouth sores    Past Medical History, Family History, Surgical History, Medications and Allergies reviewed. Outpatient Medications Marked as Taking for the 1/18/22 encounter (Office Visit) with Martinez Constantino MD   Medication Sig Dispense Refill    amLODIPine (NORVASC) 5 MG tablet Take 1 tablet by mouth daily 90 tablet 3    lisinopril (PRINIVIL;ZESTRIL) 40 MG tablet Take 1 tablet by mouth daily 90 tablet 3    triamcinolone (KENALOG) 0.1 % ointment Apply to affected area BID PRN flares 30 g 1    Omega-3 Fatty Acids (FISH OIL) 1000 MG CAPS Take 1,000 mg by mouth 2 times daily      Calcium Carbonate-Vitamin D (CALCIUM-D) 600-400 MG-UNIT TABS Take by mouth 2 times daily       Allergies   Allergen Reactions    Bactrim Rash       Past Medical History:   Diagnosis Date    Asymptomatic varicose veins     Family history of malignant neoplasm of gastrointestinal tract     Hyperlipidemia     Hypertension      Past Surgical History:   Procedure Laterality Date    FINGER AMPUTATION      left middle    HYSTERECTOMY      OTHER SURGICAL HISTORY      uterine prolapse       Physical Examination     Gen, well-appearing  FSE today    Face clear except for mild erythema  Scalp, hands/periungal area normal    trunk and extremities with scattered brown macules and papules   R medial canthal area clear    Central upper chest with scar - clear  R forearm with erythematous roughly scaled macules  Hands with dry erythematous skin on the dorsum and webspaces  Left medial lower shin with hyperkeratotic crusted rough thin pink ~8 mm papule    *photo didn't load    Has photos of more scaly erythematous edematous patches on the face with flares. Assessment and Plan     1. L lower medial shin - r/o SCC  - Shave biopsy performed after verbal consent obtained.  Patient educated regarding risk of bleeding, infection, scar and educated on wound care. Skin cleansed with alcohol pad and site anesthetized with lido + epi. Aluminum chloride applied to site for hemostasis. Petrolatum ointment and bandage applied. Specimen bottle labeled with patient information and site and specimen sent to dermpath. - ? Dr. Neal Books vs here in office if +      2. Benign-appearing nevi, SK's  - educ re si/sx/ABCD's of MM   educ sun protection - OTC sunscreen with SPF 30-50+ recommended and reviewed usage  encouraged skin check yearly (sooner if indicated), self checks    3. Hx of NMSC, no signs recurrence  - educ re ABCD's of MM   educ sun protection as above  encouraged skin check yearly (sooner if indicated), self checks    4. AK - central upper chest - bx 3-2020 - clear  *2 new on the R FA  - 2 lesion(s)  treated with liquid nitrogen x 2 cycles. Patient educated on risk of blister, hypopigmentation/scar and wound care. 5. Facial dermatitis - unclear etiology, stable/better recently  - ? Contact component but TRUE test +'s don't necessarily clinically correlate since TAC has helped  - resume TAC oint intermittently prn worsening flares; ed se/misuse and limited use  - consider elidel/protopic/eucrisa if needing more consistent steroid use  - re-eval if no improvement    6.  Hand dermatitis - from washing - stable  - TAC oint bid until clear and prn flares; ed se/misuse

## 2022-01-18 NOTE — PATIENT INSTRUCTIONS
Biopsy Wound Care Instructions    · Keep the bandage in place for 24 hours. · Cleanse the wound with mild soapy water daily   Gently dry the area.  Apply Vaseline or petroleum jelly to the wound using a cotton tipped applicator.  Cover with a clean bandage.  Repeat this process until the biopsy site is healed.  If you had stitches placed, continue treating the site until the stitches are removed. Remember to make an appointment to return to have your stitches removed by our staff.  You may shower and bathe as usual.       ** Biopsy results generally take around 7 business days to come back. If you have not heard from us by then, please call the office at [phone]. *Please note that biopsy results are released to both the patient and physician at the same time in [address] Please allow time for your physician to review the results. One of our staff members will reach out to you with the results and plan. Protecting Yourself From the Sun    · Apply an over-the-counter broad spectrum water resistant sunscreen with an SPF of at least 30 to exposed areas of the skin. Dont forget the ears and lips! Remember to reapply sunscreen about every 2 hours and after swimming or sweating. · Wear sun protective clothing. Swim shirts (aka. rash guards) are a great idea and negates the need to reapply sunscreen in those areas.      · Seek the shade whenever possible especially between the hours of 10 am and 4 pm when the suns rays are the strongest.     · Avoid tanning beds  ·

## 2022-01-21 LAB — DERMATOLOGY PATHOLOGY REPORT: ABNORMAL

## 2022-02-17 ENCOUNTER — PROCEDURE VISIT (OUTPATIENT)
Dept: DERMATOLOGY | Age: 72
End: 2022-02-17
Payer: MEDICARE

## 2022-02-17 VITALS — TEMPERATURE: 98 F

## 2022-02-17 DIAGNOSIS — C44.729 SQUAMOUS CELL CARCINOMA OF LEFT LOWER LEG: Primary | ICD-10-CM

## 2022-02-17 PROCEDURE — 12032 INTMD RPR S/A/T/EXT 2.6-7.5: CPT | Performed by: DERMATOLOGY

## 2022-02-17 PROCEDURE — 11602 EXC TR-EXT MAL+MARG 1.1-2 CM: CPT | Performed by: DERMATOLOGY

## 2022-02-17 NOTE — PROGRESS NOTES
[facility]at[facility]  Charlotte Montemayor MD  [phone]      Marky Ramp  1950    66 y.o. female     Date of Visit: 2/17/2022    Last seen: 3-2021 for FSE    Chief Complaint: [facility]  Chief Complaint   Patient presents with    Squamous Cell Carcinoma     left shin     History of Present Illness:    Here for trx of SCC on the L shin, bx'd at last visit. Path read as SCC - well-diff, KA-type. No probs since. Not quite healed yet. No blood thinners or pacemaker/defib. See last note for other derm hx - brother with hx of MM and family hx of NMSC. Review of Systems:  Gen: Feels well, good sense of health. Past Medical History, Family History, Surgical History, Medications and Allergies reviewed.     Outpatient Medications Marked as Taking for the 2/17/22 encounter (Procedure visit) with Agatha De Jesus MD   Medication Sig Dispense Refill    rosuvastatin (CRESTOR) 5 MG tablet Take 1 tablet by mouth daily 90 tablet 3    amLODIPine (NORVASC) 5 MG tablet Take 1 tablet by mouth daily 90 tablet 3    lisinopril (PRINIVIL;ZESTRIL) 40 MG tablet Take 1 tablet by mouth daily 90 tablet 3    triamcinolone (KENALOG) 0.1 % ointment Apply to affected area BID PRN flares 30 g 1    Omega-3 Fatty Acids (FISH OIL) 1000 MG CAPS Take 1,000 mg by mouth 2 times daily      Calcium Carbonate-Vitamin D (CALCIUM-D) 600-400 MG-UNIT TABS Take by mouth 2 times daily       Allergies   Allergen Reactions    Bactrim Rash       Past Medical History:   Diagnosis Date    Asymptomatic varicose veins     Family history of malignant neoplasm of gastrointestinal tract     Hyperlipidemia     Hypertension      Past Surgical History:   Procedure Laterality Date    FINGER AMPUTATION      left middle    HYSTERECTOMY      OTHER SURGICAL HISTORY      uterine prolapse       Physical Examination     Gen, well-appearing    Left medial lower shin with scaled and focally eroded pink macule                Assessment and Plan     L lower medial shin - SCC  - excision today after consented  educ risk bleed, infxn, scar   area anesth with lido with epi and prepped in sterile manner   ellipse excised to superficial SQ with 3 mm margins - 1.2 cm  specimen to path   edges undermined and hemostasis achieved with electrodessication   wound closed with layered closure 3-0 deep vicryl sutures and dermabond nylon suture   pressure dressing applied   pt educ re wnd care and suture removal     Final repair: 3.5 cm

## 2022-02-17 NOTE — PATIENT INSTRUCTIONS
Care of Wound Closed with Dermabond    A special skin glue called Dermabond was used to hold your wound together on the surface of the skin. The glue film will loosen from your skin on its own as the wound heals. Follow these care guidelines:    · Keep the wound dry. · Do not pick, scratch or rub the glue on the wound so it does not loosen before the wound heals. · Do not soak your wound in water until the glue film falls off. Avoid swimming or using a hot tub while the glue is in place. · When you shower or bathe, let water run over the wound but do not rub. Pat the wound gently with a soft towel to dry. · Do no apply any cream, lotion or ointment to the skin near the wound. It could loosen the glue before the wound heals. · Do not apply any tape, sticky dressing, or alcohol to the glue site for 10 days. These could also loosen the glue. Call your doctor if you have any of these signs of infection:    · Skin around the wound is more red, swollen or feels hot. · Fluid builds up under the glue    · Wound smells bad    · Pus drainage    · Fever     · Increasing pain    Surgical Wound Care Instructions     After your surgery, go home and take it easy. Please refrain from any vigorous physical activity or heavy lifting for 14 days.  Leave the pressure bandage in place for 48 hours. If it starts to detach, reinforce the bandage with another piece of tape.  Please keep the bandage dry for 48 hours.  After 48 hours, the wound can get wet but do not soak or scrub the area.  Apply a non stick bandage or non stick gauze pad to the site daily with medical tape for a total of 14 days. Complications:   If bleeding develops when you go home, apply pressure for 15 minutes continuously. A small amount of ice in a bag covered with a towel may be used for another 10 minutes if necessary.   If the bleeding does not stop, please call your dermatologist.   Please call the office if you develop any fevers, chills or pus drains from the wound.  A small amount of redness at the site of the surgery is normal at first, but if the redness begins to spread and/or pain worsens, you may have an infection and need to call the office.

## 2022-02-21 LAB — DERMATOLOGY PATHOLOGY REPORT: ABNORMAL

## 2022-03-03 ENCOUNTER — OFFICE VISIT (OUTPATIENT)
Dept: DERMATOLOGY | Age: 72
End: 2022-03-03

## 2022-03-03 VITALS — TEMPERATURE: 96.8 F

## 2022-03-03 DIAGNOSIS — B99.9 INFECTION: Primary | ICD-10-CM

## 2022-03-03 DIAGNOSIS — T81.49XA WOUND INFECTION AFTER SURGERY: ICD-10-CM

## 2022-03-03 PROCEDURE — 99024 POSTOP FOLLOW-UP VISIT: CPT | Performed by: DERMATOLOGY

## 2022-03-03 RX ORDER — MINOCYCLINE HYDROCHLORIDE 100 MG/1
CAPSULE ORAL
Qty: 28 CAPSULE | Refills: 0 | Status: SHIPPED | OUTPATIENT
Start: 2022-03-03 | End: 2022-07-01 | Stop reason: ALTCHOICE

## 2022-03-04 NOTE — PROGRESS NOTES
Here for wound check s/p excision of SCC on the L shin 2 weeks ago. Concerned about infxn. Notes intermittent shooting pains since excision but not significantly painful and hasn't needed any tylenol since POD 1. Notes increasing bad odor with bandage changes. Has a small amount of drainage on the bandage with changes. No F/C. Not washing the area, just letting water run over in the shower. Wound is malodorous with peripheral erythema and very mildly warm. Glue still present. Culture swab done. Start ti bid and ed SE  Start bactroban bid  Discussed cleaning and wound care  Discussed si/sx of worsening infxn - worsening pain, erythema, warmth, drainage, F/C, etc  Discussed may need abx change depending on culture results. Call with updates/concerns. Addend 3/7: culture with staph (sensitive to tcn - on ti and bactroban), klebsiella (will add gent oint - sent in) and stenotrophomonas (? Contaminant). All susceptible to Bactrim but she is allergic.

## 2022-03-06 LAB
GRAM STAIN RESULT: ABNORMAL
ORGANISM: ABNORMAL
WOUND/ABSCESS: ABNORMAL

## 2022-03-07 RX ORDER — GENTAMICIN SULFATE 1 MG/G
OINTMENT TOPICAL
Qty: 15 G | Refills: 1 | Status: SHIPPED | OUTPATIENT
Start: 2022-03-07 | End: 2022-03-14

## 2022-03-10 ENCOUNTER — OFFICE VISIT (OUTPATIENT)
Dept: DERMATOLOGY | Age: 72
End: 2022-03-10

## 2022-03-10 ENCOUNTER — TELEPHONE (OUTPATIENT)
Dept: DERMATOLOGY | Age: 72
End: 2022-03-10

## 2022-03-10 VITALS — TEMPERATURE: 97 F

## 2022-03-10 DIAGNOSIS — T81.49XA WOUND INFECTION AFTER SURGERY: Primary | ICD-10-CM

## 2022-03-10 PROCEDURE — 99024 POSTOP FOLLOW-UP VISIT: CPT | Performed by: DERMATOLOGY

## 2022-03-10 NOTE — TELEPHONE ENCOUNTER
Dr Kelly Kemp patient  Pt c/b #[phone]  Pt stated  Calling to speak with Armani Hall to give update on her previous excision on her shin after it got infected. Pt says leg is still red, open and yellow in the middle. Pt says she was told to call back and dr Kelly Kemp may have to get her in to office.

## 2022-03-10 NOTE — PROGRESS NOTES
Here for wound check s/p excision of SCC on the L shin 2-17-22 (3 weeks ago). Seen 1 week ago - culture with staph (sensitive to tcn - on ti and bactroban), klebsiella (will add gent oint - sent in) and stenotrophomonas. All susceptible to Bactrim but she is allergic. On ti + bactroban + gent oint for the past week.  to touch but overall looking better. Hasn't needed any pain medication. No F/C. No longer malodorous. Cont ti bid and ed SE  Cont bactroban bid + gent oint  Cont cleaning and wound care  Discussed si/sx of worsening infxn - worsening pain, erythema, warmth, drainage, F/C, etc  Call with updates/concerns.   F/u 1 week prn.    3/3/22 and 3/10/22

## 2022-03-17 ENCOUNTER — TELEPHONE (OUTPATIENT)
Dept: DERMATOLOGY | Age: 72
End: 2022-03-17

## 2022-03-17 NOTE — TELEPHONE ENCOUNTER
Dr Kelly Kemp patient  Pt c/b #[phone]  Pt stated  Calling to speak with Armani Hall regarding her leg. Pt says she just took her last antibiotic and the wound is still open has not gotten any better still has yellow drainage. Pt says dr Kelly Kemp told her to call office if this has not gotten better. Pt wanted to know what to do next since she's finished with her antibiotic.

## 2022-03-17 NOTE — TELEPHONE ENCOUNTER
[employer] message sent    Patient will send a photo throught [employer] of her wound. Patient continues to use her two topical antibiotics, but wasn't sure if she should have another oral antibiotic due to the area still looks not so good. Patient aware that Dr. Don Waterman is out of the office until Monday. Please forward [employer] photo to Dr. Don Waterman on Friday if received.

## 2022-03-24 ENCOUNTER — OFFICE VISIT (OUTPATIENT)
Dept: DERMATOLOGY | Age: 72
End: 2022-03-24

## 2022-03-24 VITALS — TEMPERATURE: 97.4 F

## 2022-03-24 DIAGNOSIS — B99.9 INFECTION: Primary | ICD-10-CM

## 2022-03-24 PROCEDURE — 99024 POSTOP FOLLOW-UP VISIT: CPT | Performed by: DERMATOLOGY

## 2022-03-24 RX ORDER — GENTAMICIN SULFATE 1 MG/G
OINTMENT TOPICAL 2 TIMES DAILY
COMMUNITY
End: 2022-07-07

## 2022-03-24 NOTE — PROGRESS NOTES
Chief Complaint   Patient presents with    Wound Check     left shin       Here for wound check s/p excision of SCC on the L shin 2-17-22   Seen 3 weeks ago - culture with staph (sensitive to tcn - trx w ti and bactroban), klebsiella (on gent oint - sent in) and stenotrophomonas. All susceptible to Bactrim but she is allergic. On ti + bactroban + gent oint for 2 weeks. No pain with walking but  to touch - overall looking better. Hasn't needed any pain medication. No F/C. No longer malodorous. Can stay off systemic antibiotics for now but will repeat culture today  Cont bactroban bid to open areas  Cont cleaning and wound care  Discussed si/sx of worsening infxn - worsening pain, erythema, warmth, drainage, F/C, etc  Call with updates/concerns.   F/u 1 week prn.    3/3/22 and 3/10/22       3-24- 2022

## 2022-03-28 LAB
GRAM STAIN RESULT: NORMAL
WOUND/ABSCESS: NORMAL

## 2022-06-13 ENCOUNTER — TELEPHONE (OUTPATIENT)
Dept: OTHER | Facility: CLINIC | Age: 72
End: 2022-06-13

## 2022-06-13 NOTE — TELEPHONE ENCOUNTER
Alirio Anhjacqueline, Was contacted today as part of [facility] to schedule a mammogram.         After speaking with patient she  has not had a recent mammogram.  Reminded patient that they have an open order from  Waldo Paige MD and educated on the importance of routine breast cancer screenings. Pt states an understanding and is agreeable to scheduling. Mammogram scheduled for 6/14/22 at [facility].         [facility]

## 2022-06-14 ENCOUNTER — HOSPITAL ENCOUNTER (OUTPATIENT)
Dept: WOMENS IMAGING | Age: 72
Discharge: HOME | End: 2022-06-14
Payer: MEDICARE

## 2022-06-14 VITALS — BODY MASS INDEX: 27.64 KG/M2 | WEIGHT: 172 LBS | HEIGHT: 66 IN

## 2022-06-14 DIAGNOSIS — Z12.31 ENCOUNTER FOR SCREENING MAMMOGRAM FOR BREAST CANCER: ICD-10-CM

## 2022-06-14 PROCEDURE — 77063 BREAST TOMOSYNTHESIS BI: CPT

## 2022-06-15 ENCOUNTER — OFFICE VISIT (OUTPATIENT)
Dept: FAMILY MEDICINE CLINIC | Age: 72
End: 2022-06-15
Payer: MEDICARE

## 2022-06-15 VITALS
TEMPERATURE: 97.4 F | DIASTOLIC BLOOD PRESSURE: 78 MMHG | BODY MASS INDEX: 27.96 KG/M2 | SYSTOLIC BLOOD PRESSURE: 112 MMHG | WEIGHT: 173.25 LBS | OXYGEN SATURATION: 97 % | HEART RATE: 84 BPM

## 2022-06-15 DIAGNOSIS — N30.01 ACUTE CYSTITIS WITH HEMATURIA: Primary | ICD-10-CM

## 2022-06-15 DIAGNOSIS — R10.11 ABDOMINAL PAIN, RUQ: ICD-10-CM

## 2022-06-15 LAB
BILIRUBIN, POC: NORMAL
BLOOD URINE, POC: NORMAL
CLARITY, POC: CLEAR
COLOR, POC: NORMAL
GLUCOSE URINE, POC: NORMAL
KETONES, POC: NORMAL
LEUKOCYTE EST, POC: NORMAL
NITRITE, POC: NORMAL
PH, POC: 5.5
PROTEIN, POC: NORMAL
SPECIFIC GRAVITY, POC: >=1.03
UROBILINOGEN, POC: NORMAL

## 2022-06-15 PROCEDURE — G8417 CALC BMI ABV UP PARAM F/U: HCPCS | Performed by: NURSE PRACTITIONER

## 2022-06-15 PROCEDURE — 1123F ACP DISCUSS/DSCN MKR DOCD: CPT | Performed by: NURSE PRACTITIONER

## 2022-06-15 PROCEDURE — G8399 PT W/DXA RESULTS DOCUMENT: HCPCS | Performed by: NURSE PRACTITIONER

## 2022-06-15 PROCEDURE — 99213 OFFICE O/P EST LOW 20 MIN: CPT | Performed by: NURSE PRACTITIONER

## 2022-06-15 PROCEDURE — 81002 URINALYSIS NONAUTO W/O SCOPE: CPT | Performed by: NURSE PRACTITIONER

## 2022-06-15 PROCEDURE — 1036F TOBACCO NON-USER: CPT | Performed by: NURSE PRACTITIONER

## 2022-06-15 PROCEDURE — 3017F COLORECTAL CA SCREEN DOC REV: CPT | Performed by: NURSE PRACTITIONER

## 2022-06-15 PROCEDURE — 1090F PRES/ABSN URINE INCON ASSESS: CPT | Performed by: NURSE PRACTITIONER

## 2022-06-15 PROCEDURE — G8427 DOCREV CUR MEDS BY ELIG CLIN: HCPCS | Performed by: NURSE PRACTITIONER

## 2022-06-15 RX ORDER — CIPROFLOXACIN 500 MG/1
500 TABLET, FILM COATED ORAL 2 TIMES DAILY
Qty: 14 TABLET | Refills: 0 | Status: SHIPPED | OUTPATIENT
Start: 2022-06-15 | End: 2022-07-01 | Stop reason: ALTCHOICE

## 2022-06-15 NOTE — PROGRESS NOTES
Pipe Montpelier  : 1950  Encounter date: 6/15/2022    This vero 66 y.o. female who presents with  Chief Complaint   Patient presents with    Abdominal Pain     under right rib cage since , dull achey pain that gets worse when laying down, nausea but no diarrhea    Other     started rosuvastatin a month ago       History of present illness:    HPI Pt is 66year old female with daughter, concerns for pain under R side rib cage started 3 days ago. Pt denies known trauma or twisting. Reports pain worse with lying down. Previous CT 2018-  ABDOMEN:     Liver: Mercedes Bellow and bile ducts:  Cholelithiasis.  No ductal dilation.     Pancreas:  Unremarkable.  No ductal dilation.     Spleen:  Unremarkable.  No splenomegaly.     Adrenals:  Unremarkable.  No mass.     UWVKXBK and ureters:  5 mm right UVJ stone with moderate hydronephrosis.       Stomach and bowel:  Unremarkable.  No obstruction.  No mucosal    thickening.        PELVIS:     Appendix:  No findings to suggest acute appendicitis.     Bladder:  Unremarkable.  No stones.     Reproductive:  Unremarkable as visualized. Pt denies constipation, denies blood in stool, denies change in appetite. Mild nausea. Reports pinpoint tenderness with lying down and with shifting. Denies dyspnea or cough. Pt denies burning with urination or frequency. Current Outpatient Medications on File Prior to Visit   Medication Sig Dispense Refill    gentamicin (GARAMYCIN) 0.1 % ointment Apply topically 2 times daily Apply topically 3 times daily.        mupirocin (BACTROBAN) 2 % ointment Apply to affected area BID 22 g 1    rosuvastatin (CRESTOR) 5 MG tablet Take 1 tablet by mouth daily 90 tablet 3    amLODIPine (NORVASC) 5 MG tablet Take 1 tablet by mouth daily 90 tablet 3    lisinopril (PRINIVIL;ZESTRIL) 40 MG tablet Take 1 tablet by mouth daily 90 tablet 3    triamcinolone (KENALOG) 0.1 % ointment Apply to affected area BID PRN flares 30 g 1    Omega-3 Fatty Acids (FISH OIL) 1000 MG CAPS Take 1,000 mg by mouth 2 times daily      Calcium Carbonate-Vitamin D (CALCIUM-D) 600-400 MG-UNIT TABS Take by mouth 2 times daily      minocycline (MINOCIN) 100 MG capsule One po bid with food. (Patient not taking: Reported on 3/24/2022) 28 capsule 0     No current facility-administered medications on file prior to visit. Allergies   Allergen Reactions    Bactrim Rash     Past Medical History:   Diagnosis Date    Asymptomatic varicose veins     Family history of malignant neoplasm of gastrointestinal tract     Hyperlipidemia     Hypertension       Past Surgical History:   Procedure Laterality Date    FINGER AMPUTATION      left middle    HYSTERECTOMY (CERVIX STATUS UNKNOWN)      OTHER SURGICAL HISTORY      uterine prolapse      Family History   Problem Relation Age of Onset    Cancer Mother         lung. colon, skin    High Blood Pressure Father         cerebral aneurysm    Cancer Maternal Grandmother     Cancer Maternal Grandfather     High Cholesterol Paternal Grandmother     Stroke Paternal Grandmother     High Cholesterol Paternal Grandfather     Stroke Paternal Grandfather       Social History     Tobacco Use    Smoking status: Never Smoker    Smokeless tobacco: Never Used   Substance Use Topics    Alcohol use:  Yes     Alcohol/week: 0.0 standard drinks     Comment: occ        Review of Systems    Objective:    /78 (Site: Right Upper Arm, Position: Sitting, Cuff Size: Medium Adult)   Pulse 84   Temp 97.4 °F (36.3 °C) (Infrared)   Wt 173 lb 4 oz (78.6 kg)   SpO2 97%   BMI 27.96 kg/m²   Weight: 173 lb 4 oz (78.6 kg)     BP Readings from Last 3 Encounters:   06/15/22 112/78   01/07/22 118/80   07/01/21 118/80     Wt Readings from Last 3 Encounters:   06/15/22 173 lb 4 oz (78.6 kg)   06/14/22 172 lb (78 kg)   01/07/22 170 lb (77.1 kg)     BMI Readings from Last 3 Encounters:   06/15/22 27.96 kg/m²   06/14/22 27.76 kg/m² 01/07/22 27.44 kg/m²       Physical Exam  Vitals reviewed. Constitutional:       Appearance: Normal appearance. She is well-developed. Cardiovascular:      Rate and Rhythm: Normal rate and regular rhythm. Pulses: Normal pulses. Heart sounds: Normal heart sounds. No murmur heard. Pulmonary:      Effort: Pulmonary effort is normal.      Breath sounds: Normal breath sounds. Abdominal:      General: Bowel sounds are normal. There is no distension. Palpations: Abdomen is soft. There is no mass. Tenderness: There is abdominal tenderness (RUQ radiating to umbilical). There is rebound. There is no right CVA tenderness, left CVA tenderness or guarding. Hernia: No hernia is present. Skin:     General: Skin is warm and dry. Neurological:      Mental Status: She is alert and oriented to person, place, and time. Psychiatric:         Mood and Affect: Mood normal.         Assessment/Plan    1. Acute cystitis with hematuria  Advised increased hydration  probioitc  - ciprofloxacin (CIPRO) 500 MG tablet; Take 1 tablet by mouth 2 times daily  Dispense: 14 tablet; Refill: 0    2. Abdominal pain, RUQ  Advised to hold till urine culture results  Discussed differentials including cystitis, kidney stone, gallstone, constipation  - CBC with Auto Differential; Future  - Microalbumin / Creatinine Urine Ratio; Future  - Comprehensive Metabolic Panel; Future  - Lipase; Future  - Amylase; Future  - POCT Urinalysis no Micro  - XR CHEST LATERAL; Future  - Culture, Urine  - US ABDOMEN LIMITED; Future      Return in about 1 month (around 7/15/2022) for annual check up, lab review. This dictation was generated by voice recognition computer software. Although all attempts are made to edit the dictation for accuracy, there may be errors in the transcription that are not intended.

## 2022-06-16 LAB — URINE CULTURE, ROUTINE: NORMAL

## 2022-06-17 DIAGNOSIS — E78.2 MIXED HYPERLIPIDEMIA: ICD-10-CM

## 2022-06-17 DIAGNOSIS — R10.11 ABDOMINAL PAIN, RUQ: ICD-10-CM

## 2022-06-17 LAB
A/G RATIO: 1.6 (ref 1.1–2.2)
ALBUMIN SERPL-MCNC: 4.5 G/DL (ref 3.4–5)
ALP BLD-CCNC: 108 U/L (ref 40–129)
ALT SERPL-CCNC: 37 U/L (ref 10–40)
AMYLASE: 41 U/L (ref 25–115)
ANION GAP SERPL CALCULATED.3IONS-SCNC: 17 MMOL/L (ref 3–16)
AST SERPL-CCNC: 27 U/L (ref 15–37)
BASOPHILS ABSOLUTE: 0.1 K/UL (ref 0–0.2)
BASOPHILS RELATIVE PERCENT: 0.7 %
BILIRUB SERPL-MCNC: 0.9 MG/DL (ref 0–1)
BUN BLDV-MCNC: 20 MG/DL (ref 7–20)
CALCIUM SERPL-MCNC: 9.4 MG/DL (ref 8.3–10.6)
CHLORIDE BLD-SCNC: 103 MMOL/L (ref 99–110)
CO2: 22 MMOL/L (ref 21–32)
CREAT SERPL-MCNC: 1.4 MG/DL (ref 0.6–1.2)
CREATININE URINE: 428.5 MG/DL (ref 28–259)
EOSINOPHILS ABSOLUTE: 0.1 K/UL (ref 0–0.6)
EOSINOPHILS RELATIVE PERCENT: 1.4 %
GFR AFRICAN AMERICAN: 45
GFR NON-AFRICAN AMERICAN: 37
GLUCOSE BLD-MCNC: 99 MG/DL (ref 70–99)
HCT VFR BLD CALC: 37.2 % (ref 36–48)
HEMOGLOBIN: 12.8 G/DL (ref 12–16)
LIPASE: 35 U/L (ref 13–60)
LYMPHOCYTES ABSOLUTE: 1.4 K/UL (ref 1–5.1)
LYMPHOCYTES RELATIVE PERCENT: 19.4 %
MCH RBC QN AUTO: 32 PG (ref 26–34)
MCHC RBC AUTO-ENTMCNC: 34.5 G/DL (ref 31–36)
MCV RBC AUTO: 92.8 FL (ref 80–100)
MICROALBUMIN UR-MCNC: 22.5 MG/DL
MICROALBUMIN/CREAT UR-RTO: 52.5 MG/G (ref 0–30)
MONOCYTES ABSOLUTE: 0.5 K/UL (ref 0–1.3)
MONOCYTES RELATIVE PERCENT: 7.3 %
NEUTROPHILS ABSOLUTE: 5.1 K/UL (ref 1.7–7.7)
NEUTROPHILS RELATIVE PERCENT: 71.2 %
PDW BLD-RTO: 13.7 % (ref 12.4–15.4)
PLATELET # BLD: 214 K/UL (ref 135–450)
PMV BLD AUTO: 9.4 FL (ref 5–10.5)
POTASSIUM SERPL-SCNC: 4.9 MMOL/L (ref 3.5–5.1)
RBC # BLD: 4.01 M/UL (ref 4–5.2)
SODIUM BLD-SCNC: 142 MMOL/L (ref 136–145)
TOTAL PROTEIN: 7.4 G/DL (ref 6.4–8.2)
TSH REFLEX: 2.19 UIU/ML (ref 0.27–4.2)
WBC # BLD: 7.2 K/UL (ref 4–11)

## 2022-06-20 ENCOUNTER — HOSPITAL ENCOUNTER (OUTPATIENT)
Dept: GENERAL RADIOLOGY | Age: 72
Discharge: HOME | End: 2022-06-20
Payer: MEDICARE

## 2022-06-20 ENCOUNTER — HOSPITAL ENCOUNTER (OUTPATIENT)
Dept: ULTRASOUND IMAGING | Age: 72
Discharge: HOME | End: 2022-06-20
Payer: MEDICARE

## 2022-06-20 DIAGNOSIS — R10.11 ABDOMINAL PAIN, RUQ: ICD-10-CM

## 2022-06-20 PROCEDURE — 71046 X-RAY EXAM CHEST 2 VIEWS: CPT

## 2022-06-20 PROCEDURE — 76705 ECHO EXAM OF ABDOMEN: CPT

## 2022-06-21 ENCOUNTER — PATIENT MESSAGE (OUTPATIENT)
Dept: DERMATOLOGY | Age: 72
End: 2022-06-21

## 2022-06-21 NOTE — TELEPHONE ENCOUNTER
From: Chris Beal  To: Dr. Dewayne Sidhu: 6/21/2022 10:57 AM EDT  Subject: Update    Hi Dr. Mir Lewis, I just wanted to give you an update on my spot on my leg from the surgery removal of a squamous cancer spot. I have attached a picture which was taken this morning. I am still putting the Aquaphor Healing Oinment on it with a bandage. I really dont see any improvement at all. It is tender to the touch and when the water runs over it in the shower, otherwise it doesnt bother me. I was wondering if you should take a look at this again or is there something else I could put on this to help it heal??   Thank you,   Jasmyn Johnson

## 2022-06-21 NOTE — TELEPHONE ENCOUNTER
Yes, let's get her scheduled for sometime next week or the week after. It definitely does not look better so let's see what's going on.

## 2022-06-27 ENCOUNTER — OFFICE VISIT (OUTPATIENT)
Dept: DERMATOLOGY | Age: 72
End: 2022-06-27
Payer: MEDICARE

## 2022-06-27 DIAGNOSIS — L98.491 SKIN ULCER, LIMITED TO BREAKDOWN OF SKIN (HCC): Primary | ICD-10-CM

## 2022-06-27 PROCEDURE — G8399 PT W/DXA RESULTS DOCUMENT: HCPCS | Performed by: DERMATOLOGY

## 2022-06-27 PROCEDURE — 1123F ACP DISCUSS/DSCN MKR DOCD: CPT | Performed by: DERMATOLOGY

## 2022-06-27 PROCEDURE — G8427 DOCREV CUR MEDS BY ELIG CLIN: HCPCS | Performed by: DERMATOLOGY

## 2022-06-27 PROCEDURE — 99213 OFFICE O/P EST LOW 20 MIN: CPT | Performed by: DERMATOLOGY

## 2022-06-27 PROCEDURE — G8417 CALC BMI ABV UP PARAM F/U: HCPCS | Performed by: DERMATOLOGY

## 2022-06-27 PROCEDURE — 1036F TOBACCO NON-USER: CPT | Performed by: DERMATOLOGY

## 2022-06-27 PROCEDURE — 1090F PRES/ABSN URINE INCON ASSESS: CPT | Performed by: DERMATOLOGY

## 2022-06-27 PROCEDURE — 3017F COLORECTAL CA SCREEN DOC REV: CPT | Performed by: DERMATOLOGY

## 2022-06-27 NOTE — PROGRESS NOTES
[facility] Dermat[facility]  Mecca Hollis MD  [phone]      Mahsa Alicia  1950    66 y.o. female     Date of Visit: 6/27/2022    Last seen: 3-2021 for FSE    Chief Complaint: [facility]  Chief Complaint   Patient presents with    Wound Check     left shin     History of Present Illness:    Here for f/u for persistent wound on the L shin s/p excision of SCC in this area 2-12-22. Original path read as SCC - well-diff, KA-type. Final excision read as no residual SCC within the excision. Had small area of skin breakdown that grew staph and klebsiella on swab culture 3-3-22 - treated with topical gent and bactroban + ti. F/u culture 3-24-22 negative. Has been conservatively treated since then with continued wound care of aquaphor and bandaging, changing daily. Wound slightly bigger over the past 2 mos and still very painful. Recently on cipro for UTI. See last note for other derm hx - brother with hx of MM and family hx of NMSC. Review of Systems:  Gen: Feels well, good sense of health. Past Medical History, Family History, Surgical History, Medications and Allergies reviewed. Outpatient Medications Marked as Taking for the 6/27/22 encounter (Office Visit) with Carson Spring MD   Medication Sig Dispense Refill    ciprofloxacin (CIPRO) 500 MG tablet Take 1 tablet by mouth 2 times daily 14 tablet 0    gentamicin (GARAMYCIN) 0.1 % ointment Apply topically 2 times daily Apply topically 3 times daily.  minocycline (MINOCIN) 100 MG capsule One po bid with food.  28 capsule 0    rosuvastatin (CRESTOR) 5 MG tablet Take 1 tablet by mouth daily 90 tablet 3    amLODIPine (NORVASC) 5 MG tablet Take 1 tablet by mouth daily 90 tablet 3    lisinopril (PRINIVIL;ZESTRIL) 40 MG tablet Take 1 tablet by mouth daily 90 tablet 3    triamcinolone (KENALOG) 0.1 % ointment Apply to affected area BID PRN flares 30 g 1    Omega-3 Fatty Acids (FISH OIL) 1000 MG CAPS Take 1,000 mg by mouth 2 times daily      Calcium Carbonate-Vitamin D (CALCIUM-D) 600-400 MG-UNIT TABS Take by mouth 2 times daily       Allergies   Allergen Reactions    Bactrim Rash       Past Medical History:   Diagnosis Date    Asymptomatic varicose veins     Family history of malignant neoplasm of gastrointestinal tract     Hyperlipidemia     Hypertension      Past Surgical History:   Procedure Laterality Date    FINGER AMPUTATION      left middle    HYSTERECTOMY (CERVIX STATUS UNKNOWN)      OTHER SURGICAL HISTORY      uterine prolapse       Physical Examination     Gen, well-appearing    Left medial lower shin with shallow ulcer 11 x 8 mm with chronic surrounding erythematous skin, no warmth, no odor or drainage          From excision:            Assessment and Plan     L lower medial shin - s/p excision of SCC - 11 x 8 mm ulcer remaining at 4 mos s/p excision with clear margins on path  - discussed options  - culture today though suspicion for infection is low  - referral to wound care to assist with healing

## 2022-06-30 LAB
GRAM STAIN RESULT: NORMAL
WOUND/ABSCESS: NORMAL

## 2022-07-01 ENCOUNTER — HOSPITAL ENCOUNTER (OUTPATIENT)
Dept: WOUND CARE | Age: 72
Discharge: HOME | End: 2022-07-01
Payer: MEDICARE

## 2022-07-01 VITALS
WEIGHT: 171.52 LBS | RESPIRATION RATE: 16 BRPM | SYSTOLIC BLOOD PRESSURE: 151 MMHG | HEART RATE: 85 BPM | BODY MASS INDEX: 27.68 KG/M2 | TEMPERATURE: 97.1 F | DIASTOLIC BLOOD PRESSURE: 91 MMHG

## 2022-07-01 DIAGNOSIS — T81.89XA NON-HEALING SURGICAL WOUND, INITIAL ENCOUNTER: ICD-10-CM

## 2022-07-01 PROCEDURE — 99202 OFFICE O/P NEW SF 15 MIN: CPT | Performed by: SPECIALIST

## 2022-07-01 PROCEDURE — 11042 DBRDMT SUBQ TIS 1ST 20SQCM/<: CPT | Performed by: SPECIALIST

## 2022-07-01 PROCEDURE — 99213 OFFICE O/P EST LOW 20 MIN: CPT

## 2022-07-01 PROCEDURE — 11042 DBRDMT SUBQ TIS 1ST 20SQCM/<: CPT

## 2022-07-01 RX ORDER — LIDOCAINE HYDROCHLORIDE 40 MG/ML
SOLUTION TOPICAL ONCE
Status: CANCELLED | OUTPATIENT
Start: 2022-07-01 | End: 2022-07-01

## 2022-07-01 RX ORDER — LIDOCAINE HYDROCHLORIDE 20 MG/ML
JELLY TOPICAL ONCE
Status: CANCELLED | OUTPATIENT
Start: 2022-07-01 | End: 2022-07-01

## 2022-07-01 RX ORDER — GENTAMICIN SULFATE 1 MG/G
OINTMENT TOPICAL ONCE
Status: CANCELLED | OUTPATIENT
Start: 2022-07-01 | End: 2022-07-01

## 2022-07-01 RX ORDER — LIDOCAINE 50 MG/G
OINTMENT TOPICAL ONCE
Status: CANCELLED | OUTPATIENT
Start: 2022-07-01 | End: 2022-07-01

## 2022-07-01 RX ORDER — BACITRACIN, NEOMYCIN, POLYMYXIN B 400; 3.5; 5 [USP'U]/G; MG/G; [USP'U]/G
OINTMENT TOPICAL ONCE
Status: CANCELLED | OUTPATIENT
Start: 2022-07-01 | End: 2022-07-01

## 2022-07-01 RX ORDER — BACITRACIN ZINC AND POLYMYXIN B SULFATE 500; 1000 [USP'U]/G; [USP'U]/G
OINTMENT TOPICAL ONCE
Status: CANCELLED | OUTPATIENT
Start: 2022-07-01 | End: 2022-07-01

## 2022-07-01 RX ORDER — BETAMETHASONE DIPROPIONATE 0.05 %
OINTMENT (GRAM) TOPICAL ONCE
Status: CANCELLED | OUTPATIENT
Start: 2022-07-01 | End: 2022-07-01

## 2022-07-01 RX ORDER — LIDOCAINE 40 MG/G
CREAM TOPICAL ONCE
Status: CANCELLED | OUTPATIENT
Start: 2022-07-01 | End: 2022-07-01

## 2022-07-01 RX ORDER — CLOBETASOL PROPIONATE 0.5 MG/G
OINTMENT TOPICAL ONCE
Status: CANCELLED | OUTPATIENT
Start: 2022-07-01 | End: 2022-07-01

## 2022-07-01 RX ORDER — LIDOCAINE HYDROCHLORIDE 40 MG/ML
SOLUTION TOPICAL ONCE
Status: DISCONTINUED | OUTPATIENT
Start: 2022-07-01 | End: 2022-07-02 | Stop reason: HOSPADM

## 2022-07-01 RX ORDER — GINSENG 100 MG
CAPSULE ORAL ONCE
Status: CANCELLED | OUTPATIENT
Start: 2022-07-01 | End: 2022-07-01

## 2022-07-01 ASSESSMENT — PAIN DESCRIPTION - ORIENTATION: ORIENTATION: LEFT

## 2022-07-01 ASSESSMENT — PAIN DESCRIPTION - PAIN TYPE: TYPE: ACUTE PAIN

## 2022-07-01 ASSESSMENT — PAIN DESCRIPTION - DESCRIPTORS: DESCRIPTORS: ACHING;TENDER

## 2022-07-01 ASSESSMENT — PAIN DESCRIPTION - LOCATION: LOCATION: LEG

## 2022-07-01 ASSESSMENT — PAIN DESCRIPTION - FREQUENCY: FREQUENCY: CONTINUOUS

## 2022-07-01 ASSESSMENT — PAIN SCALES - GENERAL: PAINLEVEL_OUTOF10: 4

## 2022-07-01 NOTE — PROGRESS NOTES
[address]  Progress Note and Procedure Note      [address] RECORD NUMBER:  [id number]  AGE: 66 y.o. GENDER: female  : 1950  EPISODE DATE:  2022      Subjective:     Chief Complaint   Patient presents with    Wound Check     initial         HISTORY of PRESENT ILLNESS HPI     Lizbeth Michel is a 66 y.o. female who presents today for wound evaluation. History of Wound Context: Patient was referred by her dermatologist for nonhealing surgical incision following movable of a squamous cell carcinoma from the left leg 4 months ago. He had been treated with local gentamicin and Bactroban. Most recently she has been applying Aquaphor. Describes wound as being  somewhat painful. Wound Pain Timing/Severity: mild  Quality of pain: aching  Severity:  2 / 10   Modifying Factors: None  Associated Signs/Symptoms: none    Wound Identification:  Wound Type: non-healing surgical  Contributing Factors: venous stasis    Acute Wound: N/A not an acute wound        PAST MEDICAL HISTORY        Diagnosis Date    Asymptomatic varicose veins     Family history of malignant neoplasm of gastrointestinal tract     Hyperlipidemia     Hypertension        PAST SURGICAL HISTORY    Past Surgical History:   Procedure Laterality Date    FINGER AMPUTATION      left middle    HYSTERECTOMY (CERVIX STATUS UNKNOWN)      OTHER SURGICAL HISTORY      uterine prolapse       FAMILY HISTORY    Family History   Problem Relation Age of Onset    Cancer Mother         lung.  colon, skin    High Blood Pressure Father         cerebral aneurysm    Cancer Maternal Grandmother     Cancer Maternal Grandfather     High Cholesterol Paternal Grandmother     Stroke Paternal Grandmother     High Cholesterol Paternal Grandfather     Stroke Paternal Grandfather        SOCIAL HISTORY    Social History     Tobacco Use    Smoking status: Never Smoker    Smokeless tobacco: Never Used   Substance Use Topics    Alcohol use: Yes     Alcohol/week: 0.0 standard drinks     Comment: occ    Drug use: No       ALLERGIES    Allergies   Allergen Reactions    Bactrim Rash       MEDICATIONS    Current Outpatient Medications on File Prior to Encounter   Medication Sig Dispense Refill    vitamin D 25 MCG (1000 UT) CAPS Take 1 capsule by mouth daily       gentamicin (GARAMYCIN) 0.1 % ointment Apply topically 2 times daily Apply topically 3 times daily. (Patient not taking: Reported on 7/1/2022)      mupirocin (BACTROBAN) 2 % ointment Apply to affected area BID (Patient not taking: Reported on 7/1/2022) 22 g 1    rosuvastatin (CRESTOR) 5 MG tablet Take 1 tablet by mouth daily 90 tablet 3    amLODIPine (NORVASC) 5 MG tablet Take 1 tablet by mouth daily 90 tablet 3    lisinopril (PRINIVIL;ZESTRIL) 40 MG tablet Take 1 tablet by mouth daily 90 tablet 3    triamcinolone (KENALOG) 0.1 % ointment Apply to affected area BID PRN flares 30 g 1    Omega-3 Fatty Acids (FISH OIL) 1000 MG CAPS Take 1,000 mg by mouth 2 times daily      Calcium Carbonate-Vitamin D (CALCIUM-D) 600-400 MG-UNIT TABS Take by mouth 2 times daily       No current facility-administered medications on file prior to encounter.        REVIEW OF SYSTEMS    Constitutional: negative for chills and fevers  Respiratory: negative for shortness of breath  Cardiovascular: negative for chest pain  Gastrointestinal: negative for abdominal pain  Musculoskeletal:negative for muscle weakness      Last Y2H if applicable:   Hemoglobin A1C   Date Value Ref Range Status   10/30/2018 6.1 See comment % Final     Comment:     Comment:  Diagnosis of Diabetes: > or = 6.5%  Increased risk of diabetes (Prediabetes): 5.7-6.4%  Glycemic Control: Nonpregnant Adults: <7.0%                    Pregnant: <6.0%           Objective:      BP (!) 151/91   Pulse 85   Temp 97.1 °F (36.2 °C) (Temporal)   Resp 16   Wt 171 lb 8.3 oz (77.8 kg)   BMI 27.68 kg/m²     Wt Readings from Last 3 Post-Procedure Length (cm) 1.2 cm 07/01/22 1040   Post-Procedure Width (cm) 1 cm 07/01/22 1040   Post-Procedure Depth (cm) 0.3 cm 07/01/22 1040   Post-Procedure Surface Area (cm^2) 1.2 cm^2 07/01/22 1040   Post-Procedure Volume (cm^3) 0.36 cm^3 07/01/22 1040   Distance Tunneling (cm) 0 cm 07/01/22 1015   Undermining Maxium Distance (cm) 0 07/01/22 1015   Wound Assessment [facility] 07/01/22 1015   Drainage Amount Small 07/01/22 1015   Drainage Description Serosanguinous 07/01/22 1015   Odor None 07/01/22 1015   Rosita-wound Assessment Fragile; Intact 07/01/22 1015   Margins Defined edges 07/01/22 1015   Wound Thickness Description not for Pressure Injury Full thickness 07/01/22 1015   Number of days: 0          Percent of Wound Debrided: 100%    Total Surface Area Debrided:  1.2 sq cm     Bleeding:  Minimal    Hemostasis Achieved:  by pressure    Procedural Pain:  0  / 10     Post Procedural Pain:  0 / 10     Response to treatment:  Well tolerated by patient. Plan:       Treatment Note please see attached Discharge Instructions    New Medication(s) at this visit:   New Prescriptions    No medications on file       Other orders at this visit: No orders of the defined types were placed in this encounter. Weight Management: No. N/A    Smoking Cessation: Counseling given: Not Answered        Discharge Instructions          [address] [facility] ([place], [place]) Physician Orders and Discharge Instructions  [address] [address]  Telephone: [phone]  NAME:  Ivan Bowers OF BIRTH:  1950  MEDICAL RECORD NUMBER:  [id number]  DATE:  7/1/2022    Return Appointment:  Micheal Lobe Return Appointment: With Dr. Saida Arnold  in  1 Week(s)  o Schedule weekly for the rest of the month?  Yes    o [] Return Appointment for a Wound Assessment with the nurse on:     Future Appointments   Date Time Provider Isabella Lobato   7/7/2022 10:00 AM Giovana Olson MD [address] [place] - DYD   7/11/2022 11:00 AM MD Glenys Pickering [facility]       No orders of the defined types were placed in this encounter. [address]: none  Medically necessary services: [] Skilled Nursing [] PT (Eval & Treat) [] OT (Eval & Treat) [] Social Work [] Dietician  [] Other:    Wound care instructions:  1. If you smoke we ask that you refrain from smoking. Smoking inhibits wounds from healing. 2. When taking antibiotics take the entire prescription as ordered. Do not stop taking until medication is all gone unless otherwise instructed. 3. Exercise as tolerated. 4. Keep weight off wounds and reposition every 2 hours if applicable. 5. Do not get wounds wet in the bath or shower unless otherwise instructed by your physician. If your wound is on your foot or leg, you may purchase a cast bag. Please ask at the pharmacy. 6. Wash hands with soap and water prior to and after every dressing change. [x]Wash wounds with: 0.9% normal saline  [x]Rosita wound Topical Treatments: Do not apply lotions, creams, or ointments to the skin around the wound bed unless directed as followed:   o Apply around the wound: Nothing         [x]Wound Location: left lower leg    Apply Primary Dressing to wound: Hydrofera Blue pad   Secondary Dressing: 4X4 gauze pad and Conforming roll gauze  o  Avoid contact of tape with skin if possible.  When to change Dressing: 3 times per week:Monday/Wednesday/Friday      [x]DME/Wound Dressing Supplies ordered from: Calvin Gould p:[phone] f: [phone]   o Please note, depending on your insurance coverage, you may have out-of-pocket expenses for these supplies. If your out-of-pocket cost could be substantial, many companies have financial hardship programs for patients who qualify.  If you have any questions about your supplies or your potential out-of-pocket costs, or if you need to place an order for a refill of supplies (typically monthly), please call the company directly. [x] Edema Control:  Apply: Spandagrip/Medigrip on Left; Medium compression 10-20 mm/Hg. They should be applied to affected leg(s) from mid foot to knee making sure to cover the heel      · Elevate leg(s) above the level of the heart for 30 minutes 4-5 times a day and/or when sitting. · Avoid prolonged standing in one place. Dietary:  Important dietary reminders:  1. Increase Protein intake (i.e. Lean meats, fish, eggs, legumes, and yogurt)  2. No added salt  3. If diabetic, follow a diabetic diet and check glucose prior to meals or as instructed by your physician. Dietary Supplements(Take twice a day unless instructed otherwise):  [] Vanita Hari  [] 30ml ProStat [] Nato Moose [] Ensure Max/Premier [] Other:    Your nurse  is:  Marion Mckeon     Electronically signed by Geo Trimble RN on 7/1/2022 at 10:42 AM     Janna Thomas 281: Should you experience any significant changes in your wound(s) or have questions about your wound care, please contact the [address] [place] [address] at [phone]. We are open from 8:00am - 4:30p Monday, Thursday and Friday; 11:00am - 5pm on Tuesday and CLOSED Wednesday. Please give us 24-48 business hours to return your call. Call your doctor now or seek immediate medical care if:    · You have symptoms of infection, such as:  ? Increased pain, swelling, warmth, or redness. ? Red streaks leading from the area. ? Pus draining from the area. ? A fever.         Physician for this visit and orders: Lan Day MD    [] Patient unable to sign Discharge Instructions given to ECF/Transportation/POA        Electronically signed by Lan Day MD on 7/1/2022 at 1:27 PM

## 2022-07-01 NOTE — PROGRESS NOTES
[address]:     [facility] [address]es [address] [facility] Isabella Campos  X:[phone] f: Oscar Curiel [address]: The [address] Wander Silver    Patient Information:      [place] [id number]   [phone]   : 1950  AGE: 66 y.o. GENDER: female   TODAYS DATE:  2022    Insurance:      PRIMARY INSURANCE:  Plan: MEDICARE PART A AND B  Coverage: MEDICARE  Effective Date: 2015  [id number] - (Medicare)    SECONDARY INSURANCE:  Plan: [employer] SUPP  Coverage: [employer]  Effective Date: 3/5/2015  [email or web address]    [unfilled]   [unfilled]     Patient Wound Information:      No diagnosis found. WOUNDS REQUIRING DRESSING SUPPLIES:     Wound 22 Leg Left; Lower;Medial #1 (Active)   Wound Image   22 1015   Wound Etiology Non-Healing Surgical 22 1015   Wound Cleansed Cleansed with saline 22 1015   Wound Length (cm) 1.1 cm 22 1015   Wound Width (cm) 0.9 cm 22 1015   Wound Depth (cm) 0.1 cm 22 1015   Wound Surface Area (cm^2) 0.99 cm^2 22 1015   Wound Volume (cm^3) 0.099 cm^3 22 1015   Post-Procedure Length (cm) 1.2 cm 22 1040   Post-Procedure Width (cm) 1 cm 22 1040   Post-Procedure Depth (cm) 0.3 cm 22 1040   Post-Procedure Surface Area (cm^2) 1.2 cm^2 22 1040   Post-Procedure Volume (cm^3) 0.36 cm^3 22 1040   Distance Tunneling (cm) 0 cm 22 1015   Undermining Maxium Distance (cm) 0 22 1015   Wound Assessment [facility] 22 1015   Drainage Amount Small 22 1015   Drainage Description Serosanguinous 22 1015   Odor None 22 1015   Rosita-wound Assessment Fragile; Intact 22 1015   Margins Defined edges 22 1015   Wound Thickness Description not for Pressure Injury Full thickness 07/01/22 1015   Number of days: 0          Supplies Requested :      WOUND #: 1   PRIMARY DRESSING:  None   Cover and Secure with: 4X4 gauze pad  Conforming roll gauze     FREQUENCY OF DRESSING CHANGES:  Two times per week       ADDITIONAL ITEMS:  [] Gloves Small  [] Gloves Medium [] Gloves Large [] Gloves XLarge  [] Tape 1\" [x] Tape 2\" [] Tape 3\"  [] Medipore Tape  [x] Saline  [] Skin Prep   [] Adhesive Remover   [] Cotton Tip Applicators   [] Other:    Patient Wound(s) Debrided: [x] Yes   [] No    Debridement Date: 7/1/2022    Debribement Type: Excisional/Sharp    Patient currently being seen by Home Health: [] Yes   [x] No    Duration for needed supplies:  []15  [x]30  []60  []90 Days    Provider Information:      PROVIDER'S NAME/NPI: Thalia Nj MD,  NPI: [id number]    I give permission to coordinate the care for this patient   assisting with verbal orders: Kimberlee Tapia RN 7/1/2022

## 2022-07-06 LAB
A/G RATIO: 1.6 (ref 1.2–2.2)
ALBUMIN SERPL-MCNC: 4.3 G/DL (ref 3.7–4.7)
ALP BLD-CCNC: 82 IU/L (ref 44–121)
ALT SERPL-CCNC: 14 IU/L (ref 0–32)
AST SERPL-CCNC: 17 IU/L (ref 0–40)
BASOPHILS ABSOLUTE: 0.1 X10E3/UL (ref 0–0.2)
BASOPHILS RELATIVE PERCENT: 1 %
BILIRUB SERPL-MCNC: 0.6 MG/DL (ref 0–1.2)
BUN / CREAT RATIO: 20 (ref 12–28)
BUN BLDV-MCNC: 21 MG/DL (ref 8–27)
CALCIUM SERPL-MCNC: 9.1 MG/DL (ref 8.7–10.3)
CHLORIDE BLD-SCNC: 106 MMOL/L (ref 96–106)
CHOLESTEROL, TOTAL: 161 MG/DL (ref 100–199)
CO2: 20 MMOL/L (ref 20–29)
COMMENT: NORMAL
CREAT SERPL-MCNC: 1.06 MG/DL (ref 0.57–1)
EOSINOPHILS ABSOLUTE: 0.1 X10E3/UL (ref 0–0.4)
EOSINOPHILS RELATIVE PERCENT: 2 %
ERYTHROCYTES, NUCLEATED/100 LEU: NORMAL
ESTIMATED GLOMERULAR FILTRATION RATE CREATININE EQUATION: 56 ML/MIN/1.73
GLOBULIN: 2.7 G/DL (ref 1.5–4.5)
GLUCOSE BLD-MCNC: 98 MG/DL (ref 65–99)
HCT VFR BLD CALC: 38.5 % (ref 34–46.6)
HDLC SERPL-MCNC: 43 MG/DL
HEMOGLOBIN: 12.9 G/DL (ref 11.1–15.9)
IMMATURE CELLS ABSOLUTE COUNT: NORMAL
IMMATURE GRANS (ABS): 0 X10E3/UL (ref 0–0.1)
IMMATURE GRANULOCYTES: 0 %
LDL CHOLESTEROL CALCULATED: 93 MG/DL (ref 0–99)
LYMPHOCYTES ABSOLUTE: 1.8 X10E3/UL (ref 0.7–3.1)
LYMPHOCYTES RELATIVE PERCENT: 33 %
MCH RBC QN AUTO: 30.3 PG (ref 26.6–33)
MCHC RBC AUTO-ENTMCNC: 33.5 G/DL (ref 31.5–35.7)
MCV RBC AUTO: 90 FL (ref 79–97)
MONOCYTES ABSOLUTE: 0.4 X10E3/UL (ref 0.1–0.9)
MONOCYTES RELATIVE PERCENT: 7 %
MORPHOLOGY: NORMAL
NEUTROPHILS ABSOLUTE: 3.1 X10E3/UL (ref 1.4–7)
PDW BLD-RTO: 13.1 % (ref 11.7–15.4)
PLATELET # BLD: 229 X10E3/UL (ref 150–450)
POTASSIUM SERPL-SCNC: 4.8 MMOL/L (ref 3.5–5.2)
RBC # BLD: 4.26 X10E6/UL (ref 3.77–5.28)
SEGMENTED NEUTROPHILS RELATIVE PERCENT: 57 %
SODIUM BLD-SCNC: 141 MMOL/L (ref 134–144)
TOTAL PROTEIN: 7 G/DL (ref 6–8.5)
TRIGL SERPL-MCNC: 143 MG/DL (ref 0–149)
VLDLC SERPL CALC-MCNC: 25 MG/DL (ref 5–40)
WBC # BLD: 5.5 X10E3/UL (ref 3.4–10.8)

## 2022-07-07 ENCOUNTER — OFFICE VISIT (OUTPATIENT)
Dept: FAMILY MEDICINE CLINIC | Age: 72
End: 2022-07-07
Payer: MEDICARE

## 2022-07-07 VITALS
TEMPERATURE: 97.1 F | BODY MASS INDEX: 27.44 KG/M2 | HEART RATE: 74 BPM | WEIGHT: 170 LBS | SYSTOLIC BLOOD PRESSURE: 124 MMHG | DIASTOLIC BLOOD PRESSURE: 80 MMHG | OXYGEN SATURATION: 98 %

## 2022-07-07 DIAGNOSIS — E78.2 MIXED HYPERLIPIDEMIA: ICD-10-CM

## 2022-07-07 DIAGNOSIS — I10 ESSENTIAL HYPERTENSION: Primary | ICD-10-CM

## 2022-07-07 DIAGNOSIS — T81.89XD NON-HEALING SURGICAL WOUND, SUBSEQUENT ENCOUNTER: ICD-10-CM

## 2022-07-07 PROCEDURE — 3017F COLORECTAL CA SCREEN DOC REV: CPT | Performed by: FAMILY MEDICINE

## 2022-07-07 PROCEDURE — G8417 CALC BMI ABV UP PARAM F/U: HCPCS | Performed by: FAMILY MEDICINE

## 2022-07-07 PROCEDURE — G8399 PT W/DXA RESULTS DOCUMENT: HCPCS | Performed by: FAMILY MEDICINE

## 2022-07-07 PROCEDURE — G8427 DOCREV CUR MEDS BY ELIG CLIN: HCPCS | Performed by: FAMILY MEDICINE

## 2022-07-07 PROCEDURE — 99214 OFFICE O/P EST MOD 30 MIN: CPT | Performed by: FAMILY MEDICINE

## 2022-07-07 PROCEDURE — 1123F ACP DISCUSS/DSCN MKR DOCD: CPT | Performed by: FAMILY MEDICINE

## 2022-07-07 PROCEDURE — 1036F TOBACCO NON-USER: CPT | Performed by: FAMILY MEDICINE

## 2022-07-07 PROCEDURE — 1090F PRES/ABSN URINE INCON ASSESS: CPT | Performed by: FAMILY MEDICINE

## 2022-07-07 RX ORDER — AMLODIPINE BESYLATE 5 MG/1
5 TABLET ORAL DAILY
Qty: 90 TABLET | Refills: 3 | Status: SHIPPED | OUTPATIENT
Start: 2022-07-07

## 2022-07-07 RX ORDER — LISINOPRIL 40 MG/1
40 TABLET ORAL DAILY
Qty: 90 TABLET | Refills: 3 | Status: SHIPPED | OUTPATIENT
Start: 2022-07-07

## 2022-07-07 RX ORDER — ROSUVASTATIN CALCIUM 5 MG/1
5 TABLET, COATED ORAL DAILY
Qty: 90 TABLET | Refills: 3 | Status: SHIPPED | OUTPATIENT
Start: 2022-07-07

## 2022-07-07 SDOH — ECONOMIC STABILITY: FOOD INSECURITY: WITHIN THE PAST 12 MONTHS, THE FOOD YOU BOUGHT JUST DIDN'T LAST AND YOU DIDN'T HAVE MONEY TO GET MORE.: NEVER TRUE

## 2022-07-07 SDOH — ECONOMIC STABILITY: FOOD INSECURITY: WITHIN THE PAST 12 MONTHS, YOU WORRIED THAT YOUR FOOD WOULD RUN OUT BEFORE YOU GOT MONEY TO BUY MORE.: NEVER TRUE

## 2022-07-07 ASSESSMENT — ENCOUNTER SYMPTOMS
BACK PAIN: 0
ABDOMINAL PAIN: 0
RHINORRHEA: 0
NAUSEA: 0
SHORTNESS OF BREATH: 0
CONSTIPATION: 0
DIARRHEA: 0
CHEST TIGHTNESS: 0

## 2022-07-07 ASSESSMENT — SOCIAL DETERMINANTS OF HEALTH (SDOH): HOW HARD IS IT FOR YOU TO PAY FOR THE VERY BASICS LIKE FOOD, HOUSING, MEDICAL CARE, AND HEATING?: NOT HARD AT ALL

## 2022-07-07 NOTE — PROGRESS NOTES
neoplasm of gastrointestinal tract     Hyperlipidemia     Hypertension      Past Surgical History:   Procedure Laterality Date    FINGER AMPUTATION      left middle    HYSTERECTOMY (CERVIX STATUS UNKNOWN)      OTHER SURGICAL HISTORY      uterine prolapse     Family History   Problem Relation Age of Onset    Cancer Mother         lung. colon, skin    High Blood Pressure Father         cerebral aneurysm    Cancer Maternal Grandmother     Cancer Maternal Grandfather     High Cholesterol Paternal Grandmother     Stroke Paternal Grandmother     High Cholesterol Paternal Grandfather     Stroke Paternal Grandfather      Social History     Tobacco Use    Smoking status: Never Smoker    Smokeless tobacco: Never Used   Substance Use Topics    Alcohol use: Yes     Alcohol/week: 0.0 standard drinks     Comment: occ      Allergies   Allergen Reactions    Bactrim Rash     Current Outpatient Medications on File Prior to Visit   Medication Sig Dispense Refill    vitamin D 25 MCG (1000 UT) CAPS Take 1 capsule by mouth daily       triamcinolone (KENALOG) 0.1 % ointment Apply to affected area BID PRN flares 30 g 1    Omega-3 Fatty Acids (FISH OIL) 1000 MG CAPS Take 1,000 mg by mouth 2 times daily      Calcium Carbonate-Vitamin D (CALCIUM-D) 600-400 MG-UNIT TABS Take by mouth 2 times daily       No current facility-administered medications on file prior to visit. Review of Systems   Constitutional: Negative for activity change, fatigue and unexpected weight change. HENT: Negative for congestion, postnasal drip and rhinorrhea. Respiratory: Negative for chest tightness and shortness of breath. Cardiovascular: Negative for chest pain. Gastrointestinal: Negative for abdominal pain, constipation, diarrhea and nausea. Genitourinary: Negative for difficulty urinating. Musculoskeletal: Negative for back pain. Neurological: Negative for dizziness and light-headedness.    Psychiatric/Behavioral: Negative for dysphoric mood and sleep disturbance. The patient is not nervous/anxious. OBJECTIVE:    /80   Pulse 74   Temp 97.1 °F (36.2 °C)   Wt 170 lb (77.1 kg)   SpO2 98%   BMI 27.44 kg/m²    Physical Exam  Constitutional:       Appearance: Normal appearance. She is well-developed. HENT:      Head: Normocephalic and atraumatic. Right Ear: Tympanic membrane and external ear normal.      Left Ear: Tympanic membrane and external ear normal.      Nose: Nose normal.      Mouth/Throat:      Mouth: Mucous membranes are moist.      Pharynx: No posterior oropharyngeal erythema. Eyes:      General:         Right eye: No discharge. Conjunctiva/sclera: Conjunctivae normal.   Neck:      Thyroid: No thyromegaly. Vascular: No JVD. Trachea: No tracheal deviation. Cardiovascular:      Rate and Rhythm: Normal rate and regular rhythm. Heart sounds: Normal heart sounds. Pulmonary:      Effort: Pulmonary effort is normal. No respiratory distress. Breath sounds: Normal breath sounds. No rales. Abdominal:      General: Bowel sounds are normal. There is no distension. Palpations: Abdomen is soft. There is no mass. Tenderness: There is no abdominal tenderness. There is no guarding or rebound. Musculoskeletal:      Cervical back: Normal range of motion and neck supple. Right lower leg: No edema. Left lower leg: No edema. Lymphadenopathy:      Cervical: No cervical adenopathy. Skin:     General: Skin is warm and dry. Findings: Lesion ( Left leg with dressing intact.) present. Neurological:      Mental Status: She is alert and oriented to person, place, and time. Psychiatric:         Mood and Affect: Mood normal.         ASSESSMENT/PLAN:    Radha Barnes was seen today for follow-up. Diagnoses and all orders for this visit:    Essential hypertension  Good control on current medication  -     amLODIPine (NORVASC) 5 MG tablet;  Take 1 tablet by mouth daily  -     lisinopril (PRINIVIL;ZESTRIL) 40 MG tablet; Take 1 tablet by mouth daily  -     Microalbumin / Creatinine Urine Ratio; Future    Mixed hyperlipidemia  LDL is at goal of less than 100.  -     rosuvastatin (CRESTOR) 5 MG tablet; Take 1 tablet by mouth daily  -     Comprehensive Metabolic Panel, Fasting; Future  -     CBC with Auto Differential; Future  -     Lipid, Fasting; Future    Non-healing surgical wound, subsequent encounter  Agree with referral to wound clinic. Return in about 6 months (around 1/7/2023). Please note portions of this note were completed with a voicerecognition program.  Efforts were made to edit the dictations but occasionally words are mis-transcribed.

## 2022-07-08 ENCOUNTER — HOSPITAL ENCOUNTER (OUTPATIENT)
Dept: WOUND CARE | Age: 72
Discharge: HOME | End: 2022-07-08
Payer: MEDICARE

## 2022-07-08 VITALS — HEART RATE: 72 BPM | DIASTOLIC BLOOD PRESSURE: 85 MMHG | TEMPERATURE: 97.1 F | SYSTOLIC BLOOD PRESSURE: 131 MMHG

## 2022-07-08 DIAGNOSIS — T81.89XD NON-HEALING SURGICAL WOUND, SUBSEQUENT ENCOUNTER: Primary | ICD-10-CM

## 2022-07-08 DIAGNOSIS — T81.89XA NON-HEALING SURGICAL WOUND, INITIAL ENCOUNTER: ICD-10-CM

## 2022-07-08 PROCEDURE — 11042 DBRDMT SUBQ TIS 1ST 20SQCM/<: CPT | Performed by: SPECIALIST

## 2022-07-08 PROCEDURE — 6370000000 HC RX 637 (ALT 250 FOR IP): Performed by: SPECIALIST

## 2022-07-08 PROCEDURE — 11042 DBRDMT SUBQ TIS 1ST 20SQCM/<: CPT

## 2022-07-08 RX ORDER — CLOBETASOL PROPIONATE 0.5 MG/G
OINTMENT TOPICAL ONCE
Status: CANCELLED | OUTPATIENT
Start: 2022-07-08 | End: 2022-07-08

## 2022-07-08 RX ORDER — LIDOCAINE HYDROCHLORIDE 20 MG/ML
JELLY TOPICAL ONCE
Status: CANCELLED | OUTPATIENT
Start: 2022-07-08 | End: 2022-07-08

## 2022-07-08 RX ORDER — BETAMETHASONE DIPROPIONATE 0.05 %
OINTMENT (GRAM) TOPICAL ONCE
Status: CANCELLED | OUTPATIENT
Start: 2022-07-08 | End: 2022-07-08

## 2022-07-08 RX ORDER — GINSENG 100 MG
CAPSULE ORAL ONCE
Status: CANCELLED | OUTPATIENT
Start: 2022-07-08 | End: 2022-07-08

## 2022-07-08 RX ORDER — BACITRACIN ZINC AND POLYMYXIN B SULFATE 500; 1000 [USP'U]/G; [USP'U]/G
OINTMENT TOPICAL ONCE
Status: CANCELLED | OUTPATIENT
Start: 2022-07-08 | End: 2022-07-08

## 2022-07-08 RX ORDER — BACITRACIN, NEOMYCIN, POLYMYXIN B 400; 3.5; 5 [USP'U]/G; MG/G; [USP'U]/G
OINTMENT TOPICAL ONCE
Status: CANCELLED | OUTPATIENT
Start: 2022-07-08 | End: 2022-07-08

## 2022-07-08 RX ORDER — GENTAMICIN SULFATE 1 MG/G
OINTMENT TOPICAL ONCE
Status: CANCELLED | OUTPATIENT
Start: 2022-07-08 | End: 2022-07-08

## 2022-07-08 RX ORDER — LIDOCAINE HYDROCHLORIDE 40 MG/ML
SOLUTION TOPICAL ONCE
Status: CANCELLED | OUTPATIENT
Start: 2022-07-08 | End: 2022-07-08

## 2022-07-08 RX ORDER — LIDOCAINE 40 MG/G
CREAM TOPICAL ONCE
Status: CANCELLED | OUTPATIENT
Start: 2022-07-08 | End: 2022-07-08

## 2022-07-08 RX ORDER — LIDOCAINE HYDROCHLORIDE 40 MG/ML
SOLUTION TOPICAL ONCE
Status: COMPLETED | OUTPATIENT
Start: 2022-07-08 | End: 2022-07-08

## 2022-07-08 RX ORDER — LIDOCAINE 50 MG/G
OINTMENT TOPICAL ONCE
Status: CANCELLED | OUTPATIENT
Start: 2022-07-08 | End: 2022-07-08

## 2022-07-08 RX ADMIN — LIDOCAINE HYDROCHLORIDE: 40 SOLUTION TOPICAL at 08:27

## 2022-07-08 NOTE — PROGRESS NOTES
[address] [facility]  Progress Note and Procedure Note      [address] RECORD NUMBER:  [id number]  AGE: 66 y.o. GENDER: female  : 1950  EPISODE DATE:  2022    Subjective:     Chief Complaint   Patient presents with    Wound Check     left lower leg         HISTORY of PRESENT ILLNESS HPI     Juan Knox is a 66 y.o. female who presents today for wound/ulcer evaluation. 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 She describes the wound is less painful this past week he has been using the [facility] with a Spandagrip compression. Wound/Ulcer Pain Timing/Severity: mild  Quality of pain: aching  Severity:  1 / 10   Modifying Factors: None  Associated Signs/Symptoms: none    Ulcer Identification:  Ulcer Type: non-healing surgical    Contributing Factors: venous stasis    Acute Wound: N/A not an acute wound    PAST MEDICAL HISTORY        Diagnosis Date    Asymptomatic varicose veins     Family history of malignant neoplasm of gastrointestinal tract     Hyperlipidemia     Hypertension        PAST SURGICAL HISTORY    Past Surgical History:   Procedure Laterality Date    FINGER AMPUTATION      left middle    HYSTERECTOMY (CERVIX STATUS UNKNOWN)      OTHER SURGICAL HISTORY      uterine prolapse       FAMILY HISTORY    Family History   Problem Relation Age of Onset    Cancer Mother         lung.  colon, skin    High Blood Pressure Father         cerebral aneurysm    Cancer Maternal Grandmother     Cancer Maternal Grandfather     High Cholesterol Paternal Grandmother     Stroke Paternal Grandmother     High Cholesterol Paternal Grandfather     Stroke Paternal Grandfather        SOCIAL HISTORY    Social History     Tobacco Use    Smoking status: Never Smoker    Smokeless tobacco: Never Used   Substance Use Topics    Alcohol use: Yes     Alcohol/week: 0.0 standard drinks     Comment: occ    Drug use: No       ALLERGIES    Allergies   Allergen Reactions    Bactrim Rash       MEDICATIONS    Current Outpatient Medications on File Prior to Encounter   Medication Sig Dispense Refill    rosuvastatin (CRESTOR) 5 MG tablet Take 1 tablet by mouth daily 90 tablet 3    amLODIPine (NORVASC) 5 MG tablet Take 1 tablet by mouth daily 90 tablet 3    lisinopril (PRINIVIL;ZESTRIL) 40 MG tablet Take 1 tablet by mouth daily 90 tablet 3    vitamin D 25 MCG (1000 UT) CAPS Take 1 capsule by mouth daily       triamcinolone (KENALOG) 0.1 % ointment Apply to affected area BID PRN flares (Patient not taking: Reported on 7/8/2022) 30 g 1    Omega-3 Fatty Acids (FISH OIL) 1000 MG CAPS Take 1,000 mg by mouth 2 times daily      Calcium Carbonate-Vitamin D (CALCIUM-D) 600-400 MG-UNIT TABS Take by mouth 2 times daily       No current facility-administered medications on file prior to encounter. REVIEW OF SYSTEMS  Review of Systems    Pertinent items are noted in HPI. Objective:      /85   Pulse 72   Temp 97.1 °F (36.2 °C) (Oral)     Wt Readings from Last 3 Encounters:   07/07/22 170 lb (77.1 kg)   07/01/22 171 lb 8.3 oz (77.8 kg)   06/15/22 173 lb 4 oz (78.6 kg)       PHYSICAL EXAM    Extremities: no cyanosis, no clubbing, varicose veins noted-  left lower extremity, venous stasis dermatosis noted. Full-thickness wound containing fibrin, slough, minimal granulation left anterior knee at surgical incision site. Periwound  erythematous    Assessment:      1. Non-healing surgical wound, subsequent encounter    2. Non-healing surgical wound, initial encounter         Procedure Note  Indications:  Based on my examination of this patient's wound(s) today, sharp excision is required to promote healing and evaluate the extent healing. Performed by: Jay Barfield MD    Consent obtained?  Yes    Time out taken: Yes    Pain Control: Anesthetic: 4% Lidocaine Liquid Topical     Debridement:Excisional Debridement    Using curette the wound was sharply debrided    down through and including the removal of  epidermis, dermis and subcutaneous tissue. Devitalized Tissue Debrided:  fibrin, biofilm and slough      Pre Debridement Measurements:  Are located in the Wound Documentation Flow Sheet   Wound #: 1     Post  Debridement Measurements:  Wound 07/01/22 Leg Left; Lower;Medial #1 (Active)   Wound Image   07/01/22 1015   Wound Etiology Non-Healing Surgical 07/01/22 1015   Wound Cleansed Cleansed with saline 07/08/22 0821   Dressing/Treatment Hydrofera blue 07/08/22 0902   Wound Length (cm) 1 cm 07/08/22 0821   Wound Width (cm) 0.6 cm 07/08/22 0821   Wound Depth (cm) 0.1 cm 07/08/22 0821   Wound Surface Area (cm^2) 0.6 cm^2 07/08/22 0821   Change in Wound Size % (l*w) 39.39 07/08/22 0821   Wound Volume (cm^3) 0.06 cm^3 07/08/22 0821   Wound Healing % 39 07/08/22 0821   Post-Procedure Length (cm) 1.1 cm 07/08/22 0847   Post-Procedure Width (cm) 0.7 cm 07/08/22 0847   Post-Procedure Depth (cm) 0.3 cm 07/08/22 0847   Post-Procedure Surface Area (cm^2) 0.77 cm^2 07/08/22 0847   Post-Procedure Volume (cm^3) 0.231 cm^3 07/08/22 0847   Distance Tunneling (cm) 0 cm 07/08/22 0821   Tunneling Position ___ O'Clock 0 07/08/22 0821   Undermining Starts ___ O'Clock 0 07/08/22 0821   Undermining Ends___ O'Clock 0 07/08/22 0821   Undermining Maxium Distance (cm) 0 07/08/22 0821   Wound Assessment Pink/red;Fibrin 07/08/22 0821   Drainage Amount Small 07/08/22 0821   Drainage Description Serosanguinous 07/08/22 0821   Odor None 07/08/22 0821   Rosita-wound Assessment Fragile; Intact; Hyperpigmented 07/08/22 0821   Margins Defined edges 07/08/22 0821   Wound Thickness Description not for Pressure Injury Full thickness 07/08/22 0821   Number of days: 6          Percent of Wound Debrided: 100%    Total Surface Area Debrided:  .77 sq cm    Diabetic/Pressure/Non Pressure Ulcers only:  Ulcer: Non-Pressure ulcer, fat layer exposed    Bleeding: Minimal    Hemostasis Achieved: by pressure    Procedural Pain: 0  / 10     Post Procedural Pain: 0 / 10     Response to treatment:  Well tolerated by patient. Continue with Hydrofera Blue as primary dressing. Wound measurements slightly improved from initial in visit        Plan:     Treatment Note: Please see attached Discharge Instructions. These instructions were given and signed by the patient or POA    New Prescriptions    No medications on file       Orders Placed This Encounter   Procedures   [id number] Protocol       Discharge Instructions          [address] [facility] Physician Orders and Discharge Instructions  [address] [address] [facility]. [place]  Telephone: [phone]  NAME:  Abbie Sinclair OF BIRTH:  1950  MEDICAL RECORD NUMBER:  [id number]  DATE:  7/8/2022    Return Appointment:  Courtenay Spurling Return Appointment: With Dr. Tracy Goldberg  in  1 Week(s)  o Schedule weekly for the rest of the month? done    o [] Return Appointment for a Wound Assessment with the nurse on:     Future Appointments   Date Time Provider Isabella Lobato   7/11/2022 11:00 AM MD Edwige Thomas [facility] MMA   7/15/2022  8:15 AM Ascencion Brooks  [address] WOUND [facility] HOD   7/22/2022  8:15 AM Ascencion Brooks  [address] WOUND [facility] HOD   7/29/2022  8:15 AM Ascencion Brooks  [address] WOUND [facility] HOD   8/5/2022  8:15 AM Ascencion Brooks MD [facility] WOUND [facility] HOD   1/10/2023 10:15 [facility]   Procedures   [address] Ext: none  Medically necessary services: [] Skilled Nursing [] PT (Eval & Treat) [] OT (Eval & Treat) [] Social Work [] Dietician  [] Other:    Wound care instructions:  1. If you smoke we ask that you refrain from smoking.  Smoking inhibits wounds from healing. 2. When taking antibiotics take the entire prescription as ordered. Do not stop taking until medication is all gone unless otherwise instructed. 3. Exercise as tolerated. 4. Keep weight off wounds and reposition every 2 hours if applicable. 5. Do not get wounds wet in the bath or shower unless otherwise instructed by your physician. If your wound is on your foot or leg, you may purchase a cast bag. Please ask at the pharmacy. 6. Wash hands with soap and water prior to and after every dressing change. [x]Wash wounds with: 0.9% normal saline  [x]Rosita wound Topical Treatments: Do not apply lotions, creams, or ointments to the skin around the wound bed unless directed as followed:   o Apply around the wound: Nothing         [x]Wound Location: left lower leg    Apply Primary Dressing to wound: Hydrofera Blue pad   Secondary Dressing: 4X4 gauze pad and Conforming roll gauze or bandaid  o  Avoid contact of tape with skin if possible.  When to change Dressing: 3 times per week:Monday/Wednesday/Friday      [x] Edema Control:  Apply: Spandagrip/Medigrip on Left; Medium compression 10-20 mm/Hg. They should be applied to affected leg(s) from mid foot to knee making sure to cover the heel      · Elevate leg(s) above the level of the heart for 30 minutes 4-5 times a day and/or when sitting. · Avoid prolonged standing in one place  Dietary:  Important dietary reminders:  1. Increase Protein intake (i.e. Lean meats, fish, eggs, legumes, and yogurt)  2. No added salt  3. If diabetic, follow a diabetic diet and check glucose prior to meals or as instructed by your physician.     Dietary Supplements(Take twice a day unless instructed otherwise):  [] Tia Domenic  [] 30ml ProStat [] Doneta More [] Ensure Max/Premier [] Other:    Your nurse  is:  Brandy Gould     Electronically signed by Mack Holland RN on 7/8/2022 at 8:49 AM     [address] [facility] Information: Should you experience any significant changes in your wound(s) or have questions about your wound care, please contact the [address] [place] [address] at [phone]. We are open from 8:00am - 4:30p Monday, Thursday and Friday; 11:00am - 5pm on Tuesday and CLOSED Wednesday. Please give us 24-48 business hours to return your call. Call your doctor now or seek immediate medical care if:    · You have symptoms of infection, such as:  ? Increased pain, swelling, warmth, or redness. ? Red streaks leading from the area. ? Pus draining from the area. ? A fever.         Physician for this visit and orders: Yesenia Zhong MD    [] Patient unable to sign Discharge Instructions given to ECF/Transportation/POA        Electronically signed by Yesenia Zhong MD on 7/8/2022 at 9:38 AM

## 2022-07-10 NOTE — PROGRESS NOTES
[facility]  Regla Grover MD  [phone]      Harpreet Snare  1950    66 y.o. female     Date of Visit: 7/11/2022    Last seen: 1-2022 for FSE    Chief Complaint: lesions/moles, f/u skin cancer  Chief Complaint   Patient presents with    Skin Exam     FSE            Other     seen by wound care center twice, thus far-LLE     History of Present Illness:    1. F/u SCC - L shin s/p excision of SCC in this area 2-12-22. Original path read as SCC - well-diff, KA-type. Final excision read as no residual SCC within the excision. Has had probs with prolonged healing of wound after small dehiscence. Seeing wound care now to assist with healing - seeing weekly - twice so far. 2. Here for evaluation of multiple asx pigmented lesions on the trunk and extremities, present for many years; no change in size/shape/color of any lesions; no bleeding lesions.     3. F/u other NMSC. - R medial canthal area - BCC many years ago. No probs with this site and no other new concerns. 4. F/u AK - central upper chest - bx 3-2020. No probs since. No new.    5, 6. F/u for facial dermatitis - better from baseline. flaring last year on the FH and cheeks - she attributes to mask use. Better recently though and has not needed much in terms of treatment. Hands better. Has triamcinolone to use at home as needed flares. Changed cleansers after patch testing and minimizing makeup. s/p TRUE test patch testing in 2019    Panel 18 (quaternium-15) with +  Panel 21 (formaldehye) with +  Panel 27 (tixocortol-21-pivalate) with ++    *Pruritic erythematous eruption on mainly face + upper chest and upper back - flared initially over summer 2017 and required prednisone in Sept 2017, cleared. Improved but then flared again  Improved bt didn't clear with subsequent prednisone. Doesn't use soap on the face.   Uses Pearlean Blackbird products - changed to Time Gaudencio Sandro products ~ 1 year ago and hasn't used recently. *Bx;d 3-2018 - read as subacute dermatitis w/ eos  *cleared with TAC after bx's and has had intermittent flares since last seen but much milder and clear with TAC  *still unsure of trigger    + family hx of skin cancer - NMSC    Review of Systems:  Gen: Feels well, good sense of health. Skin: No changing moles or lesions. No new rashes. Past Medical History, Family History, Surgical History, Medications and Allergies reviewed.     Outpatient Medications Marked as Taking for the 7/11/22 encounter (Office Visit) with Navi Carl MD   Medication Sig Dispense Refill    rosuvastatin (CRESTOR) 5 MG tablet Take 1 tablet by mouth daily 90 tablet 3    amLODIPine (NORVASC) 5 MG tablet Take 1 tablet by mouth daily 90 tablet 3    lisinopril (PRINIVIL;ZESTRIL) 40 MG tablet Take 1 tablet by mouth daily 90 tablet 3    vitamin D 25 MCG (1000 UT) CAPS Take 1 capsule by mouth daily       triamcinolone (KENALOG) 0.1 % ointment Apply to affected area BID PRN flares 30 g 1    Omega-3 Fatty Acids (FISH OIL) 1000 MG CAPS Take 1,000 mg by mouth 2 times daily      Calcium Carbonate-Vitamin D (CALCIUM-D) 600-400 MG-UNIT TABS Take by mouth 2 times daily       Allergies   Allergen Reactions    Bactrim Rash       Past Medical History:   Diagnosis Date    Asymptomatic varicose veins     Family history of malignant neoplasm of gastrointestinal tract     Hyperlipidemia     Hypertension      Past Surgical History:   Procedure Laterality Date    FINGER AMPUTATION      left middle    HYSTERECTOMY (CERVIX STATUS UNKNOWN)      OTHER SURGICAL HISTORY      uterine prolapse       Physical Examination     Gen, well-appearing  FSE today    Face clear except for mild erythema  Scalp, hands/periungal area normal    trunk and extremities with scattered brown macules and papules   R medial canthal area clear    Central upper chest with scar - clear  No AK's  Hands with minimal dryness today    *L shin - bandaged today - see visit from ~ 2 weeks ago for last exam    Has photos of more scaly erythematous edematous patches on the face with flares. Assessment and Plan     1. L lower medial shin - chronic wound s/p excision SCC 2-2022  - seeing wound care - cont with recs  - advised her to please call with any questions and updates if needed  - f/u 6 mos for FSE but happy to see her back in the interim as needed for wound checks     2. Benign-appearing nevi, SK's, lentigines  3. Hx of NMSC, no signs recurrence  - Monitor for ABCD's of MM and si/sx of NMSC  Continue sun protection - OTC sunscreen with SPF 30-50+ recommended and reviewed usage  Encouraged skin check in 6 mos (sooner if indicated), self checks    4. AK - central upper chest - bx 3-2020 - clear  - cont sun protection    5. Facial dermatitis - unclear etiology, stable/better recently  - ? Contact component but TRUE test +'s don't necessarily clinically correlate since TAC has helped  - resume TAC oint intermittently prn worsening flares; ed se/misuse and limited use  - consider elidel/protopic/eucrisa if needing more consistent steroid use  - re-eval if no improvement    6.  Hand dermatitis - from washing - stable  - TAC oint bid until clear and prn flares; ed se/misuse

## 2022-07-11 ENCOUNTER — OFFICE VISIT (OUTPATIENT)
Dept: DERMATOLOGY | Age: 72
End: 2022-07-11
Payer: MEDICARE

## 2022-07-11 DIAGNOSIS — L98.491 SKIN ULCER, LIMITED TO BREAKDOWN OF SKIN (HCC): Primary | ICD-10-CM

## 2022-07-11 DIAGNOSIS — L30.9 DERMATITIS: ICD-10-CM

## 2022-07-11 DIAGNOSIS — L57.0 AK (ACTINIC KERATOSIS): ICD-10-CM

## 2022-07-11 DIAGNOSIS — L81.4 LENTIGINES: ICD-10-CM

## 2022-07-11 DIAGNOSIS — Z85.828 HISTORY OF NONMELANOMA SKIN CANCER: ICD-10-CM

## 2022-07-11 DIAGNOSIS — D22.9 MULTIPLE NEVI: ICD-10-CM

## 2022-07-11 DIAGNOSIS — L82.1 SEBORRHEIC KERATOSIS: ICD-10-CM

## 2022-07-11 DIAGNOSIS — L30.9 HAND DERMATITIS: ICD-10-CM

## 2022-07-11 PROCEDURE — G8427 DOCREV CUR MEDS BY ELIG CLIN: HCPCS | Performed by: DERMATOLOGY

## 2022-07-11 PROCEDURE — 1036F TOBACCO NON-USER: CPT | Performed by: DERMATOLOGY

## 2022-07-11 PROCEDURE — 3017F COLORECTAL CA SCREEN DOC REV: CPT | Performed by: DERMATOLOGY

## 2022-07-11 PROCEDURE — 1090F PRES/ABSN URINE INCON ASSESS: CPT | Performed by: DERMATOLOGY

## 2022-07-11 PROCEDURE — 99214 OFFICE O/P EST MOD 30 MIN: CPT | Performed by: DERMATOLOGY

## 2022-07-11 PROCEDURE — G8417 CALC BMI ABV UP PARAM F/U: HCPCS | Performed by: DERMATOLOGY

## 2022-07-11 PROCEDURE — 1123F ACP DISCUSS/DSCN MKR DOCD: CPT | Performed by: DERMATOLOGY

## 2022-07-11 PROCEDURE — G8399 PT W/DXA RESULTS DOCUMENT: HCPCS | Performed by: DERMATOLOGY

## 2022-07-14 ENCOUNTER — TELEPHONE (OUTPATIENT)
Dept: WOUND CARE | Age: 72
End: 2022-07-14

## 2022-07-18 ENCOUNTER — HOSPITAL ENCOUNTER (OUTPATIENT)
Dept: WOUND CARE | Age: 72
Discharge: HOME | End: 2022-07-18
Payer: MEDICARE

## 2022-07-18 VITALS
SYSTOLIC BLOOD PRESSURE: 129 MMHG | DIASTOLIC BLOOD PRESSURE: 85 MMHG | RESPIRATION RATE: 16 BRPM | TEMPERATURE: 97 F | HEART RATE: 98 BPM

## 2022-07-18 DIAGNOSIS — T81.89XA NON-HEALING SURGICAL WOUND, INITIAL ENCOUNTER: ICD-10-CM

## 2022-07-18 DIAGNOSIS — T81.89XD NON-HEALING SURGICAL WOUND, SUBSEQUENT ENCOUNTER: Primary | ICD-10-CM

## 2022-07-18 PROCEDURE — 6370000000 HC RX 637 (ALT 250 FOR IP): Performed by: SPECIALIST

## 2022-07-18 PROCEDURE — 11042 DBRDMT SUBQ TIS 1ST 20SQCM/<: CPT

## 2022-07-18 PROCEDURE — 11042 DBRDMT SUBQ TIS 1ST 20SQCM/<: CPT | Performed by: SPECIALIST

## 2022-07-18 RX ORDER — LIDOCAINE HYDROCHLORIDE 40 MG/ML
SOLUTION TOPICAL ONCE
Status: CANCELLED | OUTPATIENT
Start: 2022-07-18 | End: 2022-07-18

## 2022-07-18 RX ORDER — LIDOCAINE HYDROCHLORIDE 40 MG/ML
SOLUTION TOPICAL ONCE
Status: COMPLETED | OUTPATIENT
Start: 2022-07-18 | End: 2022-07-18

## 2022-07-18 RX ORDER — CLOBETASOL PROPIONATE 0.5 MG/G
OINTMENT TOPICAL ONCE
Status: CANCELLED | OUTPATIENT
Start: 2022-07-18 | End: 2022-07-18

## 2022-07-18 RX ORDER — BETAMETHASONE DIPROPIONATE 0.05 %
OINTMENT (GRAM) TOPICAL ONCE
Status: CANCELLED | OUTPATIENT
Start: 2022-07-18 | End: 2022-07-18

## 2022-07-18 RX ORDER — LIDOCAINE 50 MG/G
OINTMENT TOPICAL ONCE
Status: CANCELLED | OUTPATIENT
Start: 2022-07-18 | End: 2022-07-18

## 2022-07-18 RX ORDER — BACITRACIN ZINC AND POLYMYXIN B SULFATE 500; 1000 [USP'U]/G; [USP'U]/G
OINTMENT TOPICAL ONCE
Status: CANCELLED | OUTPATIENT
Start: 2022-07-18 | End: 2022-07-18

## 2022-07-18 RX ORDER — GENTAMICIN SULFATE 1 MG/G
OINTMENT TOPICAL ONCE
Status: CANCELLED | OUTPATIENT
Start: 2022-07-18 | End: 2022-07-18

## 2022-07-18 RX ORDER — LIDOCAINE 40 MG/G
CREAM TOPICAL ONCE
Status: CANCELLED | OUTPATIENT
Start: 2022-07-18 | End: 2022-07-18

## 2022-07-18 RX ORDER — LIDOCAINE HYDROCHLORIDE 20 MG/ML
JELLY TOPICAL ONCE
Status: CANCELLED | OUTPATIENT
Start: 2022-07-18 | End: 2022-07-18

## 2022-07-18 RX ORDER — BACITRACIN, NEOMYCIN, POLYMYXIN B 400; 3.5; 5 [USP'U]/G; MG/G; [USP'U]/G
OINTMENT TOPICAL ONCE
Status: CANCELLED | OUTPATIENT
Start: 2022-07-18 | End: 2022-07-18

## 2022-07-18 RX ORDER — GINSENG 100 MG
CAPSULE ORAL ONCE
Status: CANCELLED | OUTPATIENT
Start: 2022-07-18 | End: 2022-07-18

## 2022-07-18 RX ADMIN — LIDOCAINE HYDROCHLORIDE: 40 SOLUTION TOPICAL at 08:59

## 2022-07-18 ASSESSMENT — PAIN DESCRIPTION - LOCATION: LOCATION: LEG

## 2022-07-18 ASSESSMENT — PAIN SCALES - GENERAL: PAINLEVEL_OUTOF10: 3

## 2022-07-18 ASSESSMENT — PAIN DESCRIPTION - DESCRIPTORS: DESCRIPTORS: TENDER

## 2022-07-18 ASSESSMENT — PAIN DESCRIPTION - ORIENTATION: ORIENTATION: LEFT

## 2022-07-18 NOTE — PROGRESS NOTES
1227 [facility]  Progress Note and Procedure Note      [address] RECORD NUMBER:  [id number]  AGE: 66 y.o. GENDER: female  : 1950  EPISODE DATE:  2022    Subjective:     Chief Complaint   Patient presents with    Wound Check     Left lower leg         HISTORY of PRESENT ILLNESS HPI     Selwyn Nayak is a 66 y.o. female who presents today for wound/ulcer evaluation. 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 She describes the wound is less painful this past week she has been using the [facility] with a Spandagrip compression. Wound/Ulcer Pain Timing/Severity: none  Quality of pain: N/A  Severity:  0 / 10   Modifying Factors: None  Associated Signs/Symptoms: none    Ulcer Identification:  Ulcer Type: non-healing surgical    Contributing Factors: venous stasis    Acute Wound: N/A not an acute wound    PAST MEDICAL HISTORY        Diagnosis Date    Asymptomatic varicose veins     Family history of malignant neoplasm of gastrointestinal tract     Hyperlipidemia     Hypertension        PAST SURGICAL HISTORY    Past Surgical History:   Procedure Laterality Date    FINGER AMPUTATION      left middle    HYSTERECTOMY (CERVIX STATUS UNKNOWN)      OTHER SURGICAL HISTORY      uterine prolapse       FAMILY HISTORY    Family History   Problem Relation Age of Onset    Cancer Mother         lung. colon, skin    High Blood Pressure Father         cerebral aneurysm    Cancer Maternal Grandmother     Cancer Maternal Grandfather     High Cholesterol Paternal Grandmother     Stroke Paternal Grandmother     High Cholesterol Paternal Grandfather     Stroke Paternal Grandfather        SOCIAL HISTORY    Social History     Tobacco Use    Smoking status: Never    Smokeless tobacco: Never   Substance Use Topics    Alcohol use:  Yes Alcohol/week: 0.0 standard drinks     Comment: occ    Drug use: No       ALLERGIES    Allergies   Allergen Reactions    Bactrim Rash       MEDICATIONS    Current Outpatient Medications on File Prior to Encounter   Medication Sig Dispense Refill    triamcinolone (KENALOG) 0.1 % ointment Apply to affected area BID PRN flares 30 g 1    rosuvastatin (CRESTOR) 5 MG tablet Take 1 tablet by mouth daily 90 tablet 3    amLODIPine (NORVASC) 5 MG tablet Take 1 tablet by mouth daily 90 tablet 3    lisinopril (PRINIVIL;ZESTRIL) 40 MG tablet Take 1 tablet by mouth daily 90 tablet 3    vitamin D 25 MCG (1000 UT) CAPS Take 1 capsule by mouth daily       Omega-3 Fatty Acids (FISH OIL) 1000 MG CAPS Take 1,000 mg by mouth 2 times daily      Calcium Carbonate-Vitamin D (CALCIUM-D) 600-400 MG-UNIT TABS Take by mouth 2 times daily       No current facility-administered medications on file prior to encounter. REVIEW OF SYSTEMS  Review of Systems    Pertinent items are noted in HPI. Objective:      /85   Pulse 98   Temp 97 °F (36.1 °C) (Temporal)   Resp 16     Wt Readings from Last 3 Encounters:   07/07/22 170 lb (77.1 kg)   07/01/22 171 lb 8.3 oz (77.8 kg)   06/15/22 173 lb 4 oz (78.6 kg)       PHYSICAL EXAM    General Appearance: alert and oriented to person, place and time, well-developed and well-nourished, in no acute distress  Extremities: no cyanosis, no clubbing, varicose veins noted-  left extremity, full-thickness wound containing fibrin slough and granulation tissue left anterior knee at surgical incision site. Periwound with hemosiderin deposition    Assessment:      1. Non-healing surgical wound, subsequent encounter    2. Non-healing surgical wound, initial encounter         Procedure Note  Indications:  Based on my examination of this patient's wound(s) today, sharp excision is required to promote healing and evaluate the extent healing. Performed by: Pallavi Vergara MD    Consent obtained? Yes    Time out taken: Yes    Pain Control: Anesthetic: 4% Lidocaine Liquid Topical     Debridement:Excisional Debridement    Using curette the wound was sharply debrided    down through and including the removal of  epidermis, dermis, and subcutaneous tissue. Devitalized Tissue Debrided:  fibrin and slough      Pre Debridement Measurements:  Are located in the Wound Documentation Flow Sheet   Wound #: 1     Post  Debridement Measurements:  Wound 07/01/22 Leg Left; Lower;Medial #1 (Active)   Wound Image   07/01/22 1015   Wound Etiology Non-Healing Surgical 07/01/22 1015   Wound Cleansed Cleansed with saline 07/18/22 0854   Dressing/Treatment Collagen with Ag 07/18/22 0928   Wound Length (cm) 0.8 cm 07/18/22 0854   Wound Width (cm) 0.4 cm 07/18/22 0854   Wound Depth (cm) 0.1 cm 07/18/22 0854   Wound Surface Area (cm^2) 0.32 cm^2 07/18/22 0854   Change in Wound Size % (l*w) 67.68 07/18/22 0854   Wound Volume (cm^3) 0.032 cm^3 07/18/22 0854   Wound Healing % 68 07/18/22 0854   Post-Procedure Length (cm) 0.9 cm 07/18/22 0928   Post-Procedure Width (cm) 0.5 cm 07/18/22 0928   Post-Procedure Depth (cm) 0.3 cm 07/18/22 0928   Post-Procedure Surface Area (cm^2) 0.45 cm^2 07/18/22 0928   Post-Procedure Volume (cm^3) 0.135 cm^3 07/18/22 0928   Distance Tunneling (cm) 0 cm 07/18/22 0854   Tunneling Position ___ O'Clock 0 07/08/22 0821   Undermining Starts ___ O'Clock 0 07/08/22 0821   Undermining Ends___ O'Clock 0 07/08/22 0821   Undermining Maxium Distance (cm) 0 07/18/22 0854   Wound Assessment Pink/red;Fibrin 07/18/22 0854   Drainage Amount Small 07/18/22 0854   Drainage Description Serosanguinous 07/18/22 0854   Odor None 07/18/22 0854   Rosita-wound Assessment Fragile; Intact; Hyperpigmented 07/18/22 0854   Margins Defined edges 07/18/22 0854   Wound Thickness Description not for Pressure Injury Full thickness 07/18/22 0854   Number of days: 16          Percent of Wound Debrided: 100%    Total Surface Area Debrided:  .45 sq medication is all gone unless otherwise instructed. Exercise as tolerated. Keep weight off wounds and reposition every 2 hours if applicable. Do not get wounds wet in the bath or shower unless otherwise instructed by your physician. If your wound is on your foot or leg, you may purchase a cast bag. Please ask at the pharmacy. Wash hands with soap and water prior to and after every dressing change. [x]Wash wounds with: 0.9% normal saline  [x]Rosita wound Topical Treatments: Do not apply lotions, creams, or ointments to the skin around the wound bed unless directed as followed:   Apply around the wound: Nothing         [x]Wound Location: left lower leg   Apply Primary Dressing to wound: Sravani  Secondary Dressing: 4X4 gauze pad and Conforming roll gauze   Avoid contact of tape with skin if possible. When to change Dressing: 3 times per week:Monday/Wednesday/Friday    [x]DME/Wound Dressing Supplies ordered from:[employer] p:[phone] f: [phone]    Please note, depending on your insurance coverage, you may have out-of-pocket expenses for these supplies. If your out-of-pocket cost could be substantial, many companies have financial hardship programs for patients who qualify. If you have any questions about your supplies or your potential out-of-pocket costs, or if you need to place an order for a refill of supplies (typically monthly), please call the company directly. [x] Edema Control:  Apply: Spandagrip/Medigrip on Left; Medium compression 10-20 mm/Hg. They should be applied to affected leg(s) from mid foot to knee making sure to cover the heel      Elevate leg(s) above the level of the heart for 30 minutes 4-5 times a day and/or when sitting. Avoid prolonged standing in one place. Dietary:  Important dietary reminders:  1. Increase Protein intake (i.e. Lean meats, fish, eggs, legumes, and yogurt)  2. No added salt  3.  If diabetic, follow a diabetic diet and check glucose prior to meals or as instructed by your physician. Dietary Supplements(Take twice a day unless instructed otherwise):  [] Chadwick Back  [] 30ml ProStat [] Mango Perrinble [] Ensure Max/Premier [] Other:    Your nurse  is:  Isabel Yang     Electronically signed by Adilia Lechuga RN on 7/18/2022 at 9:29 AM     215 [facility] Information: Should you experience any significant changes in your wound(s) or have questions about your wound care, please contact the [address] [place] [address] at [phone]. We are open from 8:00am - 4:30p Monday, Thursday and Friday; 11:00am - 5pm on Tuesday and CLOSED Wednesday. Please give us 24-48 business hours to return your call. Call your doctor now or seek immediate medical care if:    You have symptoms of infection, such as: Increased pain, swelling, warmth, or redness. Red streaks leading from the area. Pus draining from the area. A fever.         Physician for this visit and orders:     [] Patient unable to sign Discharge Instructions given to ECF/Transportation/POA         Electronically signed by Alex Stewart MD on 7/18/2022 at 9:55 AM

## 2022-07-18 NOTE — DISCHARGE INSTRUCTIONS
215 [facility] Physician Orders and Discharge Instructions  [address] [address] [address] [address] [facility]  Telephone: [phone]  NAME:  Haylie Krisnha OF BIRTH:  1950  MEDICAL RECORD NUMBER:  [id number]  DATE:  @ED@    Return Appointment:  Return Appointment: With Dr. Ethan Francis  in  1 [facility])  Schedule weekly for the rest of the month? Yes    [] Return Appointment for a Wound Assessment with the nurse on:     Future Appointments   Date Time Provider Isabella Lobato   7/22/2022  8:15 AM Jennifer Hendrix MD [facility]   7/29/2022  8:15 AM Jennifer Hendrix MD [facility] WOUND [facility]   8/5/2022  8:15 AM Jennifer Hendrix MD [facility] WOUND [facility]   1/9/2023  9:45 AM MD Bert Davila [facility]   1/10/2023 10:15 AM Nirmal Yeager MD [address] [place] [address] Avenue: none  Medically necessary services: [] Skilled Nursing [] PT (Eval & Treat) [] OT (Eval & Treat) [] Social Work [] Dietician  [] Other:    Wound care instructions: If you smoke we ask that you refrain from smoking. Smoking inhibits wounds from healing. When taking antibiotics take the entire prescription as ordered. Do not stop taking until medication is all gone unless otherwise instructed. Exercise as tolerated. Keep weight off wounds and reposition every 2 hours if applicable. Do not get wounds wet in the bath or shower unless otherwise instructed by your physician. If your wound is on your foot or leg, you may purchase a cast bag. Please ask at the pharmacy. Wash hands with soap and water prior to and after every dressing change.     [x]Wash wounds with: 0.9% normal saline  [x]Rosita wound Topical Treatments: Do not apply lotions, creams, or ointments to the skin around the wound bed unless directed as followed:   Apply around the wound: Nothing         [x]Wound Location: left lower leg   Apply Primary Dressing to wound: Sravani  Secondary Dressing: 4X4 gauze pad and Conforming roll gauze   Avoid contact of tape with skin if possible. When to change Dressing: 3 times per week:Monday/Wednesday/Friday    [x]DME/Wound Dressing Supplies ordered from:[employer] p:[phone] f: [phone]    Please note, depending on your insurance coverage, you may have out-of-pocket expenses for these supplies. If your out-of-pocket cost could be substantial, many companies have financial hardship programs for patients who qualify. If you have any questions about your supplies or your potential out-of-pocket costs, or if you need to place an order for a refill of supplies (typically monthly), please call the company directly. [x] Edema Control:  Apply: Spandagrip/Medigrip on Left; Medium compression 10-20 mm/Hg. They should be applied to affected leg(s) from mid foot to knee making sure to cover the heel      Elevate leg(s) above the level of the heart for 30 minutes 4-5 times a day and/or when sitting. Avoid prolonged standing in one place. Dietary:  Important dietary reminders:  1. Increase Protein intake (i.e. Lean meats, fish, eggs, legumes, and yogurt)  2. No added salt  3. If diabetic, follow a diabetic diet and check glucose prior to meals or as instructed by your physician. Dietary Supplements(Take twice a day unless instructed otherwise):  [] Chadwick Back  [] 30ml ProStat [] Darilyn Rumble [] Ensure Max/Premier [] Other:    Your nurse  is:  Isabel Yang     Electronically signed by Adilia Lechuga RN on 7/18/2022 at 9:29 AM     215 [facility] Information: Should you experience any significant changes in your wound(s) or have questions about your wound care, please contact the [address] [place] [address] at [phone]. We are open from 8:00am - 4:30p Monday, Thursday and Friday; 11:00am - 5pm on Tuesday and CLOSED Wednesday. Please give us 24-48 business hours to return your call.       Call your doctor now or seek immediate medical care if:    You have symptoms of infection, such as: Increased pain, swelling, warmth, or redness. Red streaks leading from the area. Pus draining from the area. A fever.         Physician for this visit and orders:     [] Patient unable to sign Discharge Instructions given to ECF/Transportation/POA

## 2022-07-29 ENCOUNTER — HOSPITAL ENCOUNTER (OUTPATIENT)
Dept: WOUND CARE | Age: 72
Discharge: HOME | End: 2022-07-29
Payer: MEDICARE

## 2022-07-29 VITALS
RESPIRATION RATE: 16 BRPM | HEART RATE: 71 BPM | SYSTOLIC BLOOD PRESSURE: 125 MMHG | TEMPERATURE: 97.3 F | DIASTOLIC BLOOD PRESSURE: 81 MMHG

## 2022-07-29 DIAGNOSIS — I83.93 ASYMPTOMATIC VARICOSE VEINS OF BOTH LOWER EXTREMITIES: ICD-10-CM

## 2022-07-29 DIAGNOSIS — T81.89XA NON-HEALING SURGICAL WOUND, INITIAL ENCOUNTER: ICD-10-CM

## 2022-07-29 DIAGNOSIS — T81.89XD NON-HEALING SURGICAL WOUND, SUBSEQUENT ENCOUNTER: Primary | ICD-10-CM

## 2022-07-29 PROCEDURE — 6370000000 HC RX 637 (ALT 250 FOR IP): Performed by: SPECIALIST

## 2022-07-29 PROCEDURE — 99212 OFFICE O/P EST SF 10 MIN: CPT

## 2022-07-29 PROCEDURE — 99212 OFFICE O/P EST SF 10 MIN: CPT | Performed by: SPECIALIST

## 2022-07-29 RX ORDER — LIDOCAINE 40 MG/G
CREAM TOPICAL ONCE
Status: CANCELLED | OUTPATIENT
Start: 2022-07-29 | End: 2022-07-29

## 2022-07-29 RX ORDER — LIDOCAINE 50 MG/G
OINTMENT TOPICAL ONCE
Status: CANCELLED | OUTPATIENT
Start: 2022-07-29 | End: 2022-07-29

## 2022-07-29 RX ORDER — BETAMETHASONE DIPROPIONATE 0.05 %
OINTMENT (GRAM) TOPICAL ONCE
Status: CANCELLED | OUTPATIENT
Start: 2022-07-29 | End: 2022-07-29

## 2022-07-29 RX ORDER — LIDOCAINE HYDROCHLORIDE 20 MG/ML
JELLY TOPICAL ONCE
Status: CANCELLED | OUTPATIENT
Start: 2022-07-29 | End: 2022-07-29

## 2022-07-29 RX ORDER — GENTAMICIN SULFATE 1 MG/G
OINTMENT TOPICAL ONCE
Status: CANCELLED | OUTPATIENT
Start: 2022-07-29 | End: 2022-07-29

## 2022-07-29 RX ORDER — LIDOCAINE HYDROCHLORIDE 40 MG/ML
SOLUTION TOPICAL ONCE
Status: CANCELLED | OUTPATIENT
Start: 2022-07-29 | End: 2022-07-29

## 2022-07-29 RX ORDER — BACITRACIN ZINC AND POLYMYXIN B SULFATE 500; 1000 [USP'U]/G; [USP'U]/G
OINTMENT TOPICAL ONCE
Status: CANCELLED | OUTPATIENT
Start: 2022-07-29 | End: 2022-07-29

## 2022-07-29 RX ORDER — LIDOCAINE HYDROCHLORIDE 40 MG/ML
SOLUTION TOPICAL ONCE
Status: COMPLETED | OUTPATIENT
Start: 2022-07-29 | End: 2022-07-29

## 2022-07-29 RX ORDER — GINSENG 100 MG
CAPSULE ORAL ONCE
Status: CANCELLED | OUTPATIENT
Start: 2022-07-29 | End: 2022-07-29

## 2022-07-29 RX ORDER — CLOBETASOL PROPIONATE 0.5 MG/G
OINTMENT TOPICAL ONCE
Status: CANCELLED | OUTPATIENT
Start: 2022-07-29 | End: 2022-07-29

## 2022-07-29 RX ORDER — BACITRACIN, NEOMYCIN, POLYMYXIN B 400; 3.5; 5 [USP'U]/G; MG/G; [USP'U]/G
OINTMENT TOPICAL ONCE
Status: CANCELLED | OUTPATIENT
Start: 2022-07-29 | End: 2022-07-29

## 2022-07-29 RX ADMIN — LIDOCAINE HYDROCHLORIDE: 40 SOLUTION TOPICAL at 08:30

## 2022-07-29 NOTE — DISCHARGE INSTRUCTIONS
[address] [place] [address] E. [address] [place] [address]. [facility] [place]  Telephone: [phone]    NAME:  Sara Carney OF BIRTH:  1950  MEDICAL RECORD NUMBER:  [id number]  DATE:  7/29/2022      Congratulations! You have completed your treatment program!    To prevent Venous Wounds from recurring again:  Elevate your legs at least parallel to the ground while sitting. Wear your 20-30mmHG prescription support stockings everyday and remove at night while you sleep. Replace your stockings every 3-6 months so that your legs continue to get the support they need. Inspect your legs and feet daily and report any increased swelling, unusual redness or new wounds to your physician. Wash your legs and feet regularly with mild soap and water and moisturize daily. Continue to use compression pumps if prescribed by your physician. Avoid overeating. Extra weight adds pressure on the veins in your legs. Limit salty foods as they promote swelling or fluid retention in your legs. Additional instructions for healed wound/skin care: remove silicone border dressing tomorrow        Call the [address] [place] [address] Road if your wound reopens, if new wounds occur, or if you have any other questions about your follow up care [phone].      [] Patient unable to sign Discharge Instructions given to ECF/Transportation/POA    Electronically signed by Nav Daniel RN on 7/29/2022 at 8:38 AM

## 2022-07-29 NOTE — PROGRESS NOTES
[facility]  [place] Note and Procedure Note      Julio Zhong  AGE: 66 y.o. GENDER: female  : 1950  EPISODE DATE:  2022      Subjective:     Chief Complaint   Patient presents with    Wound Check     Left lower leg         HISTORY of PRESENT ILLNESS HPI     Julio Zhong is a 66 y.o. female who presents today for wound evaluation. 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 She describes the wound is less painful this past week she has been using the collagen with a Spandagrip compression. Wound Pain Timing/Severity: none  Quality of pain: N/A  Severity:  0 / 10   Modifying Factors: None  Associated Signs/Symptoms: none    Wound Identification:  Wound Type: non-healing surgical  Contributing Factors: venous stasis        PAST MEDICAL HISTORY        Diagnosis Date    Asymptomatic varicose veins     Family history of malignant neoplasm of gastrointestinal tract     Hyperlipidemia     Hypertension        PAST SURGICAL HISTORY    Past Surgical History:   Procedure Laterality Date    FINGER AMPUTATION      left middle    HYSTERECTOMY (CERVIX STATUS UNKNOWN)      OTHER SURGICAL HISTORY      uterine prolapse       FAMILY HISTORY    Family History   Problem Relation Age of Onset    Cancer Mother         lung. colon, skin    High Blood Pressure Father         cerebral aneurysm    Cancer Maternal Grandmother     Cancer Maternal Grandfather     High Cholesterol Paternal Grandmother     Stroke Paternal Grandmother     High Cholesterol Paternal Grandfather     Stroke Paternal Grandfather        SOCIAL HISTORY    Social History     Tobacco Use    Smoking status: Never    Smokeless tobacco: Never   Substance Use Topics    Alcohol use: Yes     Alcohol/week: 0.0 standard drinks     Comment: occ    Drug use:  No ALLERGIES    Allergies   Allergen Reactions    Bactrim Rash       MEDICATIONS    Current Outpatient Medications on File Prior to Encounter   Medication Sig Dispense Refill    triamcinolone (KENALOG) 0.1 % ointment Apply to affected area BID PRN flares 30 g 1    rosuvastatin (CRESTOR) 5 MG tablet Take 1 tablet by mouth daily 90 tablet 3    amLODIPine (NORVASC) 5 MG tablet Take 1 tablet by mouth daily 90 tablet 3    lisinopril (PRINIVIL;ZESTRIL) 40 MG tablet Take 1 tablet by mouth daily 90 tablet 3    vitamin D 25 MCG (1000 UT) CAPS Take 1 capsule by mouth daily       Omega-3 Fatty Acids (FISH OIL) 1000 MG CAPS Take 1,000 mg by mouth 2 times daily      Calcium Carbonate-Vitamin D (CALCIUM-D) 600-400 MG-UNIT TABS Take by mouth 2 times daily       No current facility-administered medications on file prior to encounter. REVIEW OF SYSTEMS    Pertinent items are noted in HPI. Objective:      /81   Pulse 71   Temp 97.3 °F (36.3 °C) (Temporal)   Resp 16     PHYSICAL EXAM    General Appearance: alert and oriented to person, place and time, well-developed and well-nourished, in no acute distress  Extremities: no cyanosis, clubbing or edema, varicose veins noted-  -small scab over previous full thickness wound, otherwise complete epithelialization        Assessment:     1. Non-healing surgical wound, subsequent encounter    2. Non-healing surgical wound, initial encounter    3. Asymptomatic varicose veins of both lower extremities          Wound Measurements:          Plan:   Patient to be discharged from wound care today. I did advise her to consider wearing compression stockings for her varicosities otherwise she will be seen in her routine follow-up with her dermatologist  Treatment Note please see attached Discharge Instructions    New Prescriptions    COMPRESSION STOCKINGS MISC    by Does not apply route Strength 20-30mmHg  Style:  Other pull up stockings- knee high  Extremity: bilateral  Donning aid recommended: No       Orders Placed This Encounter   Procedures    Initiate Outpatient Wound Care Protocol       Written patient dismissal instructions given to patient and signed by patient or POA. Discharge [facility]   [address] [address] [facility]. [place]  Telephone: [phone]    NAME:  Chloe Hernandez OF BIRTH:  1950  MEDICAL RECORD NUMBER:  [id number]  DATE:  7/29/2022      Congratulations! You have completed your treatment program!    To prevent Venous Wounds from recurring again:  Elevate your legs at least parallel to the ground while sitting. Wear your 20-30mmHG prescription support stockings everyday and remove at night while you sleep. Replace your stockings every 3-6 months so that your legs continue to get the support they need. Inspect your legs and feet daily and report any increased swelling, unusual redness or new wounds to your physician. Wash your legs and feet regularly with mild soap and water and moisturize daily. Continue to use compression pumps if prescribed by your physician. Avoid overeating. Extra weight adds pressure on the veins in your legs. Limit salty foods as they promote swelling or fluid retention in your legs. Additional instructions for healed wound/skin care: remove silicone border dressing tomorrow        Call the [facility] [address] if your wound reopens, if new wounds occur, or if you have any other questions about your follow up care [phone].      [] Patient unable to sign Discharge Instructions given to ECF/Transportation/POA    Electronically signed by Bryon Yang RN on 7/29/2022 at 8:38 AM             Electronically signed by Kaitlin Green MD on 7/29/2022 at 8:46 AM

## 2022-07-29 NOTE — DISCHARGE INSTR - COC
Continuity of Care Form    Patient Name: Selwyn Nayak   :  1950  MRN:  [id number]    Admit date:  2022  Discharge date:  ***    Code Status Order: Prior   Advance Directives:     Admitting Physician:  No admitting provider for patient encounter. PCP: Jennifer Alvarado MD    Discharging Nurse: [facility] Unit/Room#: No information available for this encounter.   Discharging Unit Phone Number: ***    Emergency Contact:   Extended Emergency Contact Information  Primary Emergency Contact: Kostas Michel  Address: [address] [place] [address], [address] [facility] Eva Shukla [address] Phone: [phone]  Mobile Phone: [phone]  Relation: Spouse  Secondary Emergency Contact: Vanessa Sanchez [address] Phone: [phone]  Relation: Child    Past Surgical History:  Past Surgical History:   Procedure Laterality Date    FINGER AMPUTATION      left middle    HYSTERECTOMY (CERVIX STATUS UNKNOWN)      OTHER SURGICAL HISTORY      uterine prolapse       Immunization History:   Immunization History   Administered Date(s) Administered    COVID-19, PFIZER PURPLE top, DILUTE for use, (age 15 y+), 30mcg/0.3mL 03/10/2021, 2021    Influenza, High Dose (Fluzone 65 yrs and older) 2014, 2016, 2016, 10/25/2019    Influenza, High-dose, Quadv, 65 yrs +, IM (Fluzone) 2020, 2021    Influenza, Intradermal, Preservative free 10/25/2012, 10/28/2013    Influenza, Quadv, 6 mo and older, IM, PF (Flulaval, Fluarix) 10/15/2018    Pneumococcal Conjugate 13-valent (Ncfklnt43) 2016    Pneumococcal Polysaccharide (Uxeelpoxo67) 2017    Tdap (Boostrix, Adacel) 2010    Zoster Recombinant (Shingrix) 2020, 2020       Active Problems:  Patient Active Problem List   Diagnosis Code    Essential hypertension I10    Varicose veins I83.90    Family history of malignant neoplasm of gastrointestinal tract Z80.0    Hyperlipidemia E78.5 Accessory skin tags Q82.8    Hyperglycemia R73.9    Sigmoid diverticulitis K57.32    Intrinsic eczema L20.84    Varicose veins of both lower extremities I83.93    History of amputation of finger Z89.029    Hydronephrosis with renal and ureteral calculous obstruction N13.2    IBRAHIMA (acute kidney injury) ([facility] [address] N17.9    Heart murmur R01.1    Nonhealing surgical wound T81.89XA       Isolation/Infection:   Isolation            No Isolation          Patient Infection Status       None to display            Nurse Assessment:  Last Vital Signs: /81   Pulse 71   Temp 97.3 °F (36.3 °C) (Temporal)   Resp 16     Last documented pain score (0-10 scale):    Last Weight:   Wt Readings from Last 1 Encounters:   07/07/22 170 lb (77.1 kg)     Mental Status:  {IP PT MENTAL STATUS:20030}    IV Access:  { CAROLINE IV ACCESS:741044563}    Nursing Mobility/ADLs:  Walking   {CHP DME NBVO:349519588}  Transfer  {P DME OTSX:386353300}  Bathing  {CHP DME LJGX:752908239}  Dressing  {CHP DME VXCZ:290737027}  Toileting  {CHP DME MSKY:201556443}  Feeding  {[facility] DME ETXP:919371117}  Med Admin  {P DME TXRM:013177607}  Med Delivery   {[facility] MED Delivery:362235861}    Wound Care Documentation and Therapy:        Elimination:  Continence: Bowel: {YES / HQ:35264}  Bladder: {YES / LA:61911}  Urinary Catheter: {Urinary Catheter:160722799}   Colostomy/Ileostomy/Ileal Conduit: {YES / DT:59216}       Date of Last BM: ***  No intake or output data in the 24 hours ending 07/29/22 0838  No intake/output data recorded.     Safety Concerns:     [address] Safety Concerns:667900113}    Impairments/Disabilities:      [address] Impairments/Disabilities:203178914}    Nutrition Therapy:  Current Nutrition Therapy:   [address] Diet List:474445636}    Routes of Feeding: {P DME Other Feedings:639906525}  Liquids: {Slp liquid thickness:63233}  Daily Fluid Restriction: {CHP DME Yes amt example:273075435}  Last Modified Barium Swallow with Video (Video Swallowing Test):

## 2022-08-05 ENCOUNTER — HOSPITAL ENCOUNTER (OUTPATIENT)
Dept: CT IMAGING | Age: 72
Discharge: HOME | End: 2022-08-05
Payer: MEDICARE

## 2022-08-05 ENCOUNTER — OFFICE VISIT (OUTPATIENT)
Dept: FAMILY MEDICINE CLINIC | Age: 72
End: 2022-08-05
Payer: MEDICARE

## 2022-08-05 ENCOUNTER — NURSE TRIAGE (OUTPATIENT)
Dept: OTHER | Facility: CLINIC | Age: 72
End: 2022-08-05

## 2022-08-05 VITALS
SYSTOLIC BLOOD PRESSURE: 118 MMHG | WEIGHT: 168 LBS | TEMPERATURE: 98.1 F | DIASTOLIC BLOOD PRESSURE: 80 MMHG | BODY MASS INDEX: 27.12 KG/M2

## 2022-08-05 DIAGNOSIS — R10.30 LOWER ABDOMINAL PAIN: ICD-10-CM

## 2022-08-05 DIAGNOSIS — R10.30 LOWER ABDOMINAL PAIN: Primary | ICD-10-CM

## 2022-08-05 PROCEDURE — 1123F ACP DISCUSS/DSCN MKR DOCD: CPT | Performed by: FAMILY MEDICINE

## 2022-08-05 PROCEDURE — 99214 OFFICE O/P EST MOD 30 MIN: CPT | Performed by: FAMILY MEDICINE

## 2022-08-05 PROCEDURE — 74177 CT ABD & PELVIS W/CONTRAST: CPT

## 2022-08-05 PROCEDURE — G8417 CALC BMI ABV UP PARAM F/U: HCPCS | Performed by: FAMILY MEDICINE

## 2022-08-05 PROCEDURE — 3017F COLORECTAL CA SCREEN DOC REV: CPT | Performed by: FAMILY MEDICINE

## 2022-08-05 PROCEDURE — 6360000004 HC RX CONTRAST MEDICATION: Performed by: FAMILY MEDICINE

## 2022-08-05 PROCEDURE — G8427 DOCREV CUR MEDS BY ELIG CLIN: HCPCS | Performed by: FAMILY MEDICINE

## 2022-08-05 PROCEDURE — 1036F TOBACCO NON-USER: CPT | Performed by: FAMILY MEDICINE

## 2022-08-05 PROCEDURE — G8399 PT W/DXA RESULTS DOCUMENT: HCPCS | Performed by: FAMILY MEDICINE

## 2022-08-05 PROCEDURE — 1090F PRES/ABSN URINE INCON ASSESS: CPT | Performed by: FAMILY MEDICINE

## 2022-08-05 RX ORDER — HYDROCODONE BITARTRATE AND ACETAMINOPHEN 5; 325 MG/1; MG/1
1 TABLET ORAL EVERY 4 HOURS PRN
Qty: 30 TABLET | Refills: 0 | Status: SHIPPED | OUTPATIENT
Start: 2022-08-05 | End: 2022-08-10

## 2022-08-05 RX ORDER — METRONIDAZOLE 500 MG/1
500 TABLET ORAL 3 TIMES DAILY
Qty: 30 TABLET | Refills: 0 | Status: SHIPPED | OUTPATIENT
Start: 2022-08-05 | End: 2022-08-15

## 2022-08-05 RX ORDER — CIPROFLOXACIN 500 MG/1
500 TABLET, FILM COATED ORAL 2 TIMES DAILY
Qty: 20 TABLET | Refills: 0 | Status: SHIPPED | OUTPATIENT
Start: 2022-08-05 | End: 2022-08-15

## 2022-08-05 RX ADMIN — DIATRIZOATE MEGLUMINE AND DIATRIZOATE SODIUM 20 ML: 600; 100 SOLUTION ORAL; RECTAL at 18:06

## 2022-08-05 RX ADMIN — IOPAMIDOL 75 ML: 755 INJECTION, SOLUTION INTRAVENOUS at 18:07

## 2022-08-05 ASSESSMENT — ENCOUNTER SYMPTOMS
CONSTIPATION: 0
NAUSEA: 1
RECTAL PAIN: 0
VOMITING: 0
DIARRHEA: 0
SHORTNESS OF BREATH: 0
ABDOMINAL PAIN: 1

## 2022-08-05 NOTE — PROGRESS NOTES
SUBJECTIVE:    Juan Knox is a 66 y.o. female who presents for a follow up visit. Chief Complaint   Patient presents with    Abdominal Pain     Patient c/o lower abd pain, started on wed. Passing mucous with stool. Chills. Abdominal Pain  This is a new problem. Episode onset: 2 days ago. The problem occurs constantly. The pain is located in the LLQ and RLQ. The pain is at a severity of 6/10. The quality of the pain is aching and dull. Associated symptoms include anorexia and nausea (some). Pertinent negatives include no constipation, diarrhea, fever or vomiting. Associated symptoms comments: Passing mucus. The pain is aggravated by movement and palpation. She has tried nothing for the symptoms. diverticulitis in 2017      Patient's medications, allergies, past medical,surgical, social and family histories were reviewed and updated as appropriate. Past Medical History:   Diagnosis Date    Asymptomatic varicose veins     Family history of malignant neoplasm of gastrointestinal tract     Hyperlipidemia     Hypertension      Past Surgical History:   Procedure Laterality Date    FINGER AMPUTATION      left middle    HYSTERECTOMY (CERVIX STATUS UNKNOWN)      OTHER SURGICAL HISTORY      uterine prolapse     Family History   Problem Relation Age of Onset    Cancer Mother         lung. colon, skin    High Blood Pressure Father         cerebral aneurysm    Cancer Maternal Grandmother     Cancer Maternal Grandfather     High Cholesterol Paternal Grandmother     Stroke Paternal Grandmother     High Cholesterol Paternal Grandfather     Stroke Paternal Grandfather      Social History     Tobacco Use    Smoking status: Never    Smokeless tobacco: Never   Substance Use Topics    Alcohol use:  Yes     Alcohol/week: 0.0 standard drinks     Comment: occ      Allergies   Allergen Reactions    Bactrim Rash     Current Outpatient Medications on File Prior to Visit   Medication Sig Dispense Refill    Compression Stockings MISC by Does not apply route Strength 20-30mmHg  Style: Other pull up stockings- knee high  Extremity: bilateral  Donning aid recommended: No 1 each 0    triamcinolone (KENALOG) 0.1 % ointment Apply to affected area BID PRN flares 30 g 1    rosuvastatin (CRESTOR) 5 MG tablet Take 1 tablet by mouth daily 90 tablet 3    amLODIPine (NORVASC) 5 MG tablet Take 1 tablet by mouth daily 90 tablet 3    lisinopril (PRINIVIL;ZESTRIL) 40 MG tablet Take 1 tablet by mouth daily 90 tablet 3    vitamin D 25 MCG (1000 UT) CAPS Take 1 capsule by mouth daily       Omega-3 Fatty Acids (FISH OIL) 1000 MG CAPS Take 1,000 mg by mouth 2 times daily      Calcium Carbonate-Vitamin D (CALCIUM-D) 600-400 MG-UNIT TABS Take by mouth 2 times daily       No current facility-administered medications on file prior to visit. Review of Systems   Constitutional:  Negative for fever. Respiratory:  Negative for shortness of breath. Cardiovascular:  Negative for chest pain. Gastrointestinal:  Positive for abdominal pain (Lower abdomen with LLQ > RLQ), anorexia and nausea (some). Negative for constipation, diarrhea, rectal pain and vomiting. Genitourinary:  Negative for difficulty urinating. Psychiatric/Behavioral:  Positive for sleep disturbance (up 4 to 5 times due to pain the past 2 nights). OBJECTIVE:    /80   Temp 98.1 °F (36.7 °C)   Wt 168 lb (76.2 kg)   BMI 27.12 kg/m²    Physical Exam  Constitutional:       General: She is not in acute distress. Appearance: Normal appearance. She is well-developed. HENT:      Head: Normocephalic and atraumatic. Right Ear: Tympanic membrane and external ear normal.      Left Ear: External ear normal.      Nose: Nose normal.      Mouth/Throat:      Mouth: Mucous membranes are moist.      Pharynx: No posterior oropharyngeal erythema. Eyes:      General:         Right eye: No discharge.       Conjunctiva/sclera: Conjunctivae normal.   Neck:      Thyroid: No thyromegaly. Vascular: No JVD. Trachea: No tracheal deviation. Cardiovascular:      Rate and Rhythm: Normal rate and regular rhythm. Heart sounds: Normal heart sounds. Pulmonary:      Effort: Pulmonary effort is normal. No respiratory distress. Breath sounds: Normal breath sounds. No rales. Abdominal:      Palpations: There is no mass. Tenderness: There is abdominal tenderness (Across lower abdomen with left lower quadrant the point of greatest tenderness. ). There is no guarding or rebound. Hernia: No hernia is present. Musculoskeletal:      Cervical back: Normal range of motion and neck supple. Lymphadenopathy:      Cervical: No cervical adenopathy. Skin:     General: Skin is warm and dry. Neurological:      Mental Status: She is alert and oriented to person, place, and time. Psychiatric:         Mood and Affect: Mood normal.         Behavior: Behavior normal.       ASSESSMENT/PLAN:    Promise Wilson was seen today for abdominal pain. Diagnoses and all orders for this visit:    Lower abdominal pain  -     CT ABDOMEN PELVIS W IV CONTRAST Additional Contrast? Oral; Future  -     ciprofloxacin (CIPRO) 500 MG tablet; Take 1 tablet by mouth in the morning and 1 tablet before bedtime. Do all this for 10 days. -     metroNIDAZOLE (FLAGYL) 500 MG tablet; Take 1 tablet by mouth in the morning and 1 tablet at noon and 1 tablet before bedtime. Do all this for 10 days.  -     HYDROcodone-acetaminophen (NORCO) 5-325 MG per tablet; Take 1 tablet by mouth every 4 hours as needed for Pain for up to 5 days. Intended supply: 5 days. Take lowest dose possible to manage pain  Further disposition will depend on the results of her CT of her abdomen and pelvis. Handout on diverticulitis is given. Return if symptoms worsen or fail to improve.     Please note portions of this note were completed with a voicerecognition program.  Efforts were made to edit the dictations but occasionally words are mis-transcribed.

## 2022-09-02 ENCOUNTER — PATIENT MESSAGE (OUTPATIENT)
Dept: DERMATOLOGY | Age: 72
End: 2022-09-02

## 2022-09-02 ENCOUNTER — TELEPHONE (OUTPATIENT)
Dept: DERMATOLOGY | Age: 72
End: 2022-09-02

## 2022-09-02 RX ORDER — PREDNISONE 10 MG/1
TABLET ORAL
Qty: 40 TABLET | Refills: 0 | Status: SHIPPED | OUTPATIENT
Start: 2022-09-02 | End: 2022-09-18

## 2022-09-02 NOTE — TELEPHONE ENCOUNTER
Pt calling states she is having some discomfort on both of her inner arms states it itch and is on the trunk of her body as have some concern pls return call back @ [phone] states this began this week

## 2022-09-02 NOTE — TELEPHONE ENCOUNTER
Returned pt call  Informed pt MD out of office at present  Will return on Tuesday   Pt states has had rash all week  Had similar in past  Been using triamcinolone she used previously  Helped in past 2017, 2018  Once with steroid which worked, another time steroid did not help    Pt sending photo to Dev    Suggested pt call PCP  Or urgent care  Can wait til MD returns to office

## 2022-09-02 NOTE — TELEPHONE ENCOUNTER
I spoke to the patient. She developed a pruritic eruption on the upper extremities, chest and abdomen over the last few days. She has been using triamcinolone 0.1% cream with only mild improvement. She is not aware of any new exposures. She has not been working in the yard. Pictures were reviewed. Concern for allergic contact dermatitis    Prednisone taper over the next 16 days starting at 40 mg. Discussed risks of sleep disturbance and elevated blood sugar. Continue triamcinolone 0.1% cream twice daily until improved.

## 2022-09-02 NOTE — TELEPHONE ENCOUNTER
From: Zac Taylor  To: Dr. Jia Ugalde: 9/2/2022 1:27 PM EDT  Subject: Carlreema Paddock - skin rash    Here are the photos you requested. I could only send to. I do have blotches on the trunk of my body. Thanks for your help!   Hema Basilio

## 2022-11-14 ENCOUNTER — TELEPHONE (OUTPATIENT)
Dept: FAMILY MEDICINE CLINIC | Age: 72
End: 2022-11-14

## 2022-11-14 NOTE — TELEPHONE ENCOUNTER
----- Message from [place] sent at 11/14/2022 10:30 AM EST -----  Subject: Appointment Request    Reason for Call: Established Patient Appointment needed: Urgent Cough Cold    QUESTIONS    Reason for appointment request? No appointments available during search     Additional Information for Provider?  Patient has had a cough over a week   and has diverticulitis.   ---------------------------------------------------------------------------  --------------  [facility]  [phone]; OK to leave message on voicemail  ---------------------------------------------------------------------------  --------------  SCRIPT ANSWERS  COVID Screen: Red

## 2022-11-15 ENCOUNTER — OFFICE VISIT (OUTPATIENT)
Dept: FAMILY MEDICINE CLINIC | Age: 72
End: 2022-11-15
Payer: MEDICARE

## 2022-11-15 DIAGNOSIS — R05.3 CHRONIC COUGH: ICD-10-CM

## 2022-11-15 DIAGNOSIS — K57.92 DIVERTICULITIS: Primary | ICD-10-CM

## 2022-11-15 PROCEDURE — G8484 FLU IMMUNIZE NO ADMIN: HCPCS | Performed by: FAMILY MEDICINE

## 2022-11-15 PROCEDURE — 1036F TOBACCO NON-USER: CPT | Performed by: FAMILY MEDICINE

## 2022-11-15 PROCEDURE — G8399 PT W/DXA RESULTS DOCUMENT: HCPCS | Performed by: FAMILY MEDICINE

## 2022-11-15 PROCEDURE — 99214 OFFICE O/P EST MOD 30 MIN: CPT | Performed by: FAMILY MEDICINE

## 2022-11-15 PROCEDURE — 1123F ACP DISCUSS/DSCN MKR DOCD: CPT | Performed by: FAMILY MEDICINE

## 2022-11-15 PROCEDURE — G8428 CUR MEDS NOT DOCUMENT: HCPCS | Performed by: FAMILY MEDICINE

## 2022-11-15 PROCEDURE — 3017F COLORECTAL CA SCREEN DOC REV: CPT | Performed by: FAMILY MEDICINE

## 2022-11-15 PROCEDURE — G8417 CALC BMI ABV UP PARAM F/U: HCPCS | Performed by: FAMILY MEDICINE

## 2022-11-15 PROCEDURE — 1090F PRES/ABSN URINE INCON ASSESS: CPT | Performed by: FAMILY MEDICINE

## 2022-11-15 RX ORDER — METRONIDAZOLE 500 MG/1
500 TABLET ORAL 3 TIMES DAILY
Qty: 30 TABLET | Refills: 0 | Status: SHIPPED | OUTPATIENT
Start: 2022-11-15 | End: 2022-11-25

## 2022-11-15 RX ORDER — LEVOFLOXACIN 500 MG/1
500 TABLET, FILM COATED ORAL DAILY
Qty: 10 TABLET | Refills: 0 | Status: SHIPPED | OUTPATIENT
Start: 2022-11-15 | End: 2022-11-25

## 2022-11-15 RX ORDER — HYDROCODONE BITARTRATE AND HOMATROPINE METHYLBROMIDE ORAL SOLUTION 5; 1.5 MG/5ML; MG/5ML
2.5 LIQUID ORAL 4 TIMES DAILY PRN
Qty: 70 ML | Refills: 0 | Status: SHIPPED | OUTPATIENT
Start: 2022-11-15 | End: 2022-11-22

## 2022-11-15 ASSESSMENT — ENCOUNTER SYMPTOMS
ABDOMINAL PAIN: 1
DIARRHEA: 1
CONSTIPATION: 1
COUGH: 1

## 2022-11-15 NOTE — PROGRESS NOTES
SUBJECTIVE:    Richard Gallegos is a 67 y.o. female who presents for a follow up visit. Chief Complaint   Patient presents with    Cough        Cough  This is a chronic problem. The current episode started more than 1 year ago. The problem occurs every few minutes. The cough is Non-productive. Associated symptoms include ear congestion and nasal congestion. Pertinent negatives include no postnasal drip. She has tried nothing for the symptoms. Abdominal Pain  This is a new problem. The current episode started in the past 7 days. The onset quality is sudden. The problem occurs constantly. The problem has been unchanged. The pain is located in the suprapubic region. The pain is at a severity of 4/10. The quality of the pain is aching, sharp and colicky. Associated symptoms include constipation and diarrhea (now soft). The pain is relieved by Nothing. She has tried nothing for the symptoms. Patient's medications, allergies, past medical,surgical, social and family histories were reviewed and updated as appropriate. Past Medical History:   Diagnosis Date    Asymptomatic varicose veins     Family history of malignant neoplasm of gastrointestinal tract     Hyperlipidemia     Hypertension      Past Surgical History:   Procedure Laterality Date    FINGER AMPUTATION      left middle    HYSTERECTOMY (CERVIX STATUS UNKNOWN)      OTHER SURGICAL HISTORY      uterine prolapse     Family History   Problem Relation Age of Onset    Cancer Mother         lung. colon, skin    High Blood Pressure Father         cerebral aneurysm    Cancer Maternal Grandmother     Cancer Maternal Grandfather     High Cholesterol Paternal Grandmother     Stroke Paternal Grandmother     High Cholesterol Paternal Grandfather     Stroke Paternal Grandfather      Social History     Tobacco Use    Smoking status: Never    Smokeless tobacco: Never   Substance Use Topics    Alcohol use:  Yes     Alcohol/week: 0.0 standard drinks     Comment: occ Allergies   Allergen Reactions    Bactrim Rash     Current Outpatient Medications on File Prior to Visit   Medication Sig Dispense Refill    Compression Stockings MISC by Does not apply route Strength 20-30mmHg  Style: Other pull up stockings- knee high  Extremity: bilateral  Donning aid recommended: No 1 each 0    triamcinolone (KENALOG) 0.1 % ointment Apply to affected area BID PRN flares 30 g 1    rosuvastatin (CRESTOR) 5 MG tablet Take 1 tablet by mouth daily 90 tablet 3    amLODIPine (NORVASC) 5 MG tablet Take 1 tablet by mouth daily 90 tablet 3    lisinopril (PRINIVIL;ZESTRIL) 40 MG tablet Take 1 tablet by mouth daily 90 tablet 3    vitamin D 25 MCG (1000 UT) CAPS Take 1 capsule by mouth daily       Omega-3 Fatty Acids (FISH OIL) 1000 MG CAPS Take 1,000 mg by mouth 2 times daily      Calcium Carbonate-Vitamin D (CALCIUM-D) 600-400 MG-UNIT TABS Take by mouth 2 times daily       No current facility-administered medications on file prior to visit. Review of Systems   HENT:  Negative for postnasal drip. Respiratory:  Positive for cough. Gastrointestinal:  Positive for abdominal pain, constipation and diarrhea (now soft). OBJECTIVE:    There were no vitals taken for this visit. Physical Exam  Constitutional:       Appearance: She is well-developed. HENT:      Head: Normocephalic and atraumatic. Right Ear: External ear normal.      Left Ear: External ear normal.      Nose: Nose normal.   Eyes:      General:         Right eye: No discharge. Conjunctiva/sclera: Conjunctivae normal.   Neck:      Thyroid: No thyromegaly. Vascular: No JVD. Trachea: No tracheal deviation. Cardiovascular:      Rate and Rhythm: Normal rate and regular rhythm. Heart sounds: Normal heart sounds. Pulmonary:      Effort: Pulmonary effort is normal. No respiratory distress. Breath sounds: Normal breath sounds. No rales.    Abdominal:      General: Bowel sounds are normal.      Palpations: Abdomen is soft. Tenderness: There is abdominal tenderness (mild suprapubic area). Musculoskeletal:      Cervical back: Normal range of motion and neck supple. Lymphadenopathy:      Cervical: No cervical adenopathy. Skin:     General: Skin is warm and dry. Neurological:      Mental Status: She is alert and oriented to person, place, and time. ASSESSMENT/PLAN:    Juan J Joni was seen today for cough. Diagnoses and all orders for this visit:    Diverticulitis  -     levoFLOXacin (LEVAQUIN) 500 MG tablet; Take 1 tablet by mouth daily for 10 days  -     metroNIDAZOLE (FLAGYL) 500 MG tablet; Take 1 tablet by mouth 3 times daily for 10 days    Chronic cough  -     HYDROcodone homatropine (HYCODAN) 5-1.5 MG/5ML solution; Take 2.5 mLs by mouth 4 times daily as needed (cough) for up to 7 days. Return for regularly scheduled follow-up. Please note portions of this note were completed with a voicerecognition program.  Efforts were made to edit the dictations but occasionally words are mis-transcribed.

## 2022-12-13 ENCOUNTER — HOSPITAL ENCOUNTER (OUTPATIENT)
Dept: MRI IMAGING | Age: 72
Discharge: HOME | End: 2022-12-13
Payer: MEDICARE

## 2022-12-13 DIAGNOSIS — D32.9 MENINGIOMA (HCC): ICD-10-CM

## 2022-12-13 PROCEDURE — 6360000004 HC RX CONTRAST MEDICATION: Performed by: NEUROLOGICAL SURGERY

## 2022-12-13 PROCEDURE — 70553 MRI BRAIN STEM W/O & W/DYE: CPT

## 2022-12-13 PROCEDURE — A9579 GAD-BASE MR CONTRAST NOS,1ML: HCPCS | Performed by: NEUROLOGICAL SURGERY

## 2022-12-13 RX ADMIN — GADOTERIDOL 16 ML: 279.3 INJECTION, SOLUTION INTRAVENOUS at 10:30

## 2022-12-27 ENCOUNTER — TELEPHONE (OUTPATIENT)
Dept: FAMILY MEDICINE CLINIC | Age: 72
End: 2022-12-27

## 2022-12-27 DIAGNOSIS — N30.01 ACUTE CYSTITIS WITH HEMATURIA: ICD-10-CM

## 2022-12-27 DIAGNOSIS — K57.92 DIVERTICULITIS: ICD-10-CM

## 2022-12-27 RX ORDER — CIPROFLOXACIN 500 MG/1
500 TABLET, FILM COATED ORAL 2 TIMES DAILY
Qty: 14 TABLET | Refills: 0 | Status: SHIPPED | OUTPATIENT
Start: 2022-12-27

## 2022-12-27 RX ORDER — METRONIDAZOLE 500 MG/1
500 TABLET ORAL 3 TIMES DAILY
Qty: 30 TABLET | Refills: 0 | Status: SHIPPED | OUTPATIENT
Start: 2022-12-27 | End: 2023-01-06

## 2022-12-27 NOTE — TELEPHONE ENCOUNTER
----- Message from Joanna Fabian sent at 12/27/2022  9:53 AM EST -----  Subject: Refill Request    QUESTIONS  Name of Medication? ciprofloxacin (CIPRO) 500 MG tablet  Patient-reported dosage and instructions? Take 1 tablet by mouth in the   morning and 1 tablet before bedtime. Do all this for 10 days. How many days do you have left? 0  Preferred Pharmacy? [facility] [id number]  Pharmacy phone number (if available)? [phone]  Additional Information for Provider? pt is having troubles with her   diverticulitis  ---------------------------------------------------------------------------  --------------,  Name of Medication? metroNIDAZOLE (FLAGYL) 500 MG tablet  Patient-reported dosage and instructions? Take 1 tablet by mouth in the   morning and 1 tablet at noon and 1 tablet before bedtime. Do all this for   10 days. How many days do you have left? 0  Preferred Pharmacy? [facility] [id number]  Pharmacy phone number (if available)? [phone]  Additional Information for Provider? please call pt when scripts get   called in  ---------------------------------------------------------------------------  --------------  [address]  What is the best way for the office to contact you? OK to leave message on   voicemail  Preferred Call Back Phone Number? [phone]  ---------------------------------------------------------------------------  --------------  SCRIPT ANSWERS  Relationship to Patient?  Self

## 2023-01-06 DIAGNOSIS — I10 ESSENTIAL HYPERTENSION: ICD-10-CM

## 2023-01-06 DIAGNOSIS — E78.2 MIXED HYPERLIPIDEMIA: ICD-10-CM

## 2023-01-06 LAB
A/G RATIO: 1.3 (ref 1.1–2.2)
ALBUMIN SERPL-MCNC: 4 G/DL (ref 3.4–5)
ALP BLD-CCNC: 63 U/L (ref 40–129)
ALT SERPL-CCNC: 9 U/L (ref 10–40)
ANION GAP SERPL CALCULATED.3IONS-SCNC: 12 MMOL/L (ref 3–16)
AST SERPL-CCNC: 15 U/L (ref 15–37)
BASOPHILS ABSOLUTE: 0.1 K/UL (ref 0–0.2)
BASOPHILS RELATIVE PERCENT: 0.6 %
BILIRUB SERPL-MCNC: 0.4 MG/DL (ref 0–1)
BUN BLDV-MCNC: 13 MG/DL (ref 7–20)
CALCIUM SERPL-MCNC: 9.3 MG/DL (ref 8.3–10.6)
CHLORIDE BLD-SCNC: 104 MMOL/L (ref 99–110)
CHOLESTEROL, FASTING: 129 MG/DL (ref 0–199)
CO2: 25 MMOL/L (ref 21–32)
CREAT SERPL-MCNC: 1.1 MG/DL (ref 0.6–1.2)
EOSINOPHILS ABSOLUTE: 0 K/UL (ref 0–0.6)
EOSINOPHILS RELATIVE PERCENT: 0.5 %
GFR SERPL CREATININE-BSD FRML MDRD: 53 ML/MIN/{1.73_M2}
GLUCOSE FASTING: 91 MG/DL (ref 70–99)
HCT VFR BLD CALC: 41.1 % (ref 36–48)
HDLC SERPL-MCNC: 52 MG/DL (ref 40–60)
HEMOGLOBIN: 13.3 G/DL (ref 12–16)
LDL CHOLESTEROL CALCULATED: 58 MG/DL
LYMPHOCYTES ABSOLUTE: 2 K/UL (ref 1–5.1)
LYMPHOCYTES RELATIVE PERCENT: 24.2 %
MCH RBC QN AUTO: 29.6 PG (ref 26–34)
MCHC RBC AUTO-ENTMCNC: 32.3 G/DL (ref 31–36)
MCV RBC AUTO: 91.7 FL (ref 80–100)
MONOCYTES ABSOLUTE: 0.4 K/UL (ref 0–1.3)
MONOCYTES RELATIVE PERCENT: 4.7 %
NEUTROPHILS ABSOLUTE: 5.8 K/UL (ref 1.7–7.7)
NEUTROPHILS RELATIVE PERCENT: 70 %
PDW BLD-RTO: 14.4 % (ref 12.4–15.4)
PLATELET # BLD: 261 K/UL (ref 135–450)
PMV BLD AUTO: 9.3 FL (ref 5–10.5)
POTASSIUM SERPL-SCNC: 4.1 MMOL/L (ref 3.5–5.1)
RBC # BLD: 4.48 M/UL (ref 4–5.2)
SODIUM BLD-SCNC: 141 MMOL/L (ref 136–145)
TOTAL PROTEIN: 7 G/DL (ref 6.4–8.2)
TRIGLYCERIDE, FASTING: 94 MG/DL (ref 0–150)
VLDLC SERPL CALC-MCNC: 19 MG/DL
WBC # BLD: 8.3 K/UL (ref 4–11)

## 2023-01-07 LAB
CREATININE URINE: 222.1 MG/DL (ref 28–259)
MICROALBUMIN UR-MCNC: <1.2 MG/DL
MICROALBUMIN/CREAT UR-RTO: NORMAL MG/G (ref 0–30)

## 2023-01-09 ENCOUNTER — OFFICE VISIT (OUTPATIENT)
Dept: DERMATOLOGY | Age: 73
End: 2023-01-09

## 2023-01-09 DIAGNOSIS — L82.1 SEBORRHEIC KERATOSIS: ICD-10-CM

## 2023-01-09 DIAGNOSIS — L81.4 LENTIGINES: ICD-10-CM

## 2023-01-09 DIAGNOSIS — L30.9 HAND DERMATITIS: ICD-10-CM

## 2023-01-09 DIAGNOSIS — Z85.828 HISTORY OF NONMELANOMA SKIN CANCER: Primary | ICD-10-CM

## 2023-01-09 DIAGNOSIS — D22.9 MULTIPLE NEVI: ICD-10-CM

## 2023-01-09 DIAGNOSIS — L57.0 AK (ACTINIC KERATOSIS): ICD-10-CM

## 2023-01-09 DIAGNOSIS — L30.9 DERMATITIS: ICD-10-CM

## 2023-01-09 NOTE — PROGRESS NOTES
SUBJECTIVE:    Teresa Michel is a 72 y.o. female who presents for a follow up visit.    Chief Complaint   Patient presents with    Hypertension        Hyperlipidemia  This is a chronic problem. Lipid results: Total cholesterol 129, triglycerides 94, HDL 52, LDL 58. Pertinent negatives include no chest pain or shortness of breath. Treatments tried: Omega-3 fish oil and Crestor 5 mg daily. The current treatment provides significant improvement of lipids. Compliance problems include adherence to exercise and adherence to diet.  Risk factors for coronary artery disease include dyslipidemia, hypertension, a sedentary lifestyle and post-menopausal.   Hypertension  This is a chronic problem. The current episode started more than 1 year ago. The problem is controlled. Pertinent negatives include no chest pain or shortness of breath. Risk factors for coronary artery disease include sedentary lifestyle and dyslipidemia. Past treatments include ACE inhibitors and calcium channel blockers. The current treatment provides significant improvement. Compliance problems include exercise and diet.     Decreased hearing  This is a chronic problem.  She says she thinks her hearing is getting worse and she would like to have an evaluation for possible hearing aids.    Patient's medications, allergies, past medical,surgical, social and family histories were reviewed and updated as appropriate.     Past Medical History:   Diagnosis Date    Asymptomatic varicose veins     Family history of malignant neoplasm of gastrointestinal tract     Hyperlipidemia     Hypertension      Past Surgical History:   Procedure Laterality Date    FINGER AMPUTATION      left middle    HYSTERECTOMY (CERVIX STATUS UNKNOWN)      OTHER SURGICAL HISTORY      uterine prolapse     Family History   Problem Relation Age of Onset    Cancer Mother         lung. colon, skin    High Blood Pressure Father         cerebral aneurysm    Cancer Maternal Grandmother     Cancer  Maternal Grandfather     High Cholesterol Paternal Grandmother     Stroke Paternal Grandmother     High Cholesterol Paternal Grandfather     Stroke Paternal Grandfather      Social History     Tobacco Use    Smoking status: Never    Smokeless tobacco: Never   Substance Use Topics    Alcohol use: Yes     Alcohol/week: 0.0 standard drinks     Comment: occ      Allergies   Allergen Reactions    Bactrim Rash     Current Outpatient Medications on File Prior to Visit   Medication Sig Dispense Refill    triamcinolone (KENALOG) 0.1 % ointment Apply to affected area BID PRN flares 30 g 1    rosuvastatin (CRESTOR) 5 MG tablet Take 1 tablet by mouth daily 90 tablet 3    amLODIPine (NORVASC) 5 MG tablet Take 1 tablet by mouth daily 90 tablet 3    lisinopril (PRINIVIL;ZESTRIL) 40 MG tablet Take 1 tablet by mouth daily 90 tablet 3    vitamin D 25 MCG (1000 UT) CAPS Take 1 capsule by mouth daily       Omega-3 Fatty Acids (FISH OIL) 1000 MG CAPS Take 1,000 mg by mouth 2 times daily      Calcium Carbonate-Vitamin D (CALCIUM-D) 600-400 MG-UNIT TABS Take by mouth 2 times daily      Compression Stockings MISC by Does not apply route Strength 20-30mmHg  Style: Other pull up stockings- knee high  Extremity: bilateral  Donning aid recommended: No 1 each 0     No current facility-administered medications on file prior to visit. Review of Systems   Constitutional:  Negative for activity change and fatigue. HENT:  Negative for congestion, postnasal drip and rhinorrhea. Respiratory:  Negative for shortness of breath. Cardiovascular:  Negative for chest pain. Gastrointestinal:  Negative for abdominal pain, constipation and diarrhea. Genitourinary:  Negative for difficulty urinating. Musculoskeletal:  Negative for arthralgias and back pain. Neurological:  Negative for dizziness and light-headedness. Psychiatric/Behavioral:  Negative for dysphoric mood and sleep disturbance. The patient is not nervous/anxious. OBJECTIVE:    /80 (Site: Left Upper Arm, Position: Sitting, Cuff Size: Medium Adult)   Pulse 68   Temp 98.1 °F (36.7 °C) (Infrared)   Wt 158 lb 6.4 oz (71.8 kg)   SpO2 99%   BMI 25.57 kg/m²    Physical Exam  Constitutional:       Appearance: She is well-developed. HENT:      Head: Normocephalic and atraumatic. Right Ear: External ear normal.      Left Ear: External ear normal.      Nose: Nose normal.   Eyes:      General:         Right eye: No discharge. Conjunctiva/sclera: Conjunctivae normal.   Neck:      Thyroid: No thyromegaly. Vascular: No JVD. Trachea: No tracheal deviation. Cardiovascular:      Rate and Rhythm: Normal rate and regular rhythm. Heart sounds: Normal heart sounds. Pulmonary:      Effort: Pulmonary effort is normal. No respiratory distress. Breath sounds: Normal breath sounds. No rales. Musculoskeletal:      Cervical back: Normal range of motion and neck supple. Lymphadenopathy:      Cervical: No cervical adenopathy. Skin:     General: Skin is warm and dry. Neurological:      Mental Status: She is alert and oriented to person, place, and time. Psychiatric:         Mood and Affect: Mood normal.         Behavior: Behavior normal.       ASSESSMENT/PLAN:    Mack Jones was seen today for hypertension. Diagnoses and all orders for this visit:    Essential hypertension  Controlled on current medication    Mixed hyperlipidemia  LDL is at goal of less than 70 with a value of 58  -     Comprehensive Metabolic Panel, Fasting; Future  -     CBC with Auto Differential; Future  -     Lipid, Fasting; Future  -     TSH with Reflex;  Future    Needs flu shot  -     Influenza, FLUAD, (age 72 y+), IM, Preservative Free, 0.5 mL    Diverticulitis  This is a recurrent problem but patient has been doing fairly well and she is up-to-date on her colonoscopies    Stage 3a chronic kidney disease ([facility] [address]  Doing well with most recent estimated filtration rate 53    Hyperglycemia  -     Hemoglobin A1C; Future    Decreased hearing of both ears  -     Anushka Koch, Luke Grewal DO, Otolaryngology, [facility]      Return in about 6 months (around 7/10/2023). Please note portions of this note were completed with a voicerecognition program.  Efforts were made to edit the dictations but occasionally words are mis-transcribed.

## 2023-01-09 NOTE — PROGRESS NOTES
[facility] Dermatology  Kirsty Zafar MD  [phone]      Gershon Cushing  1950    67 y.o. female     Date of Visit: 1/9/2023    Last seen: 7-2022 for FSE    Chief Complaint: lesions/moles, f/u skin cancer  Chief Complaint   Patient presents with    Skin Exam     History of Present Illness:    1. F/u SCC - L shin s/p excision of SCC in this area 2-12-22. Original path read as SCC - well-diff, KA-type. Final excision read as no residual SCC within the excision. Had probs with prolonged healing of wound after small dehiscence. Saw wound care to assist with healing - finally healed. Surrounding skin is firm with scar tissue but no pain, no new concerns otw. 2. F/u other NMSC. - R medial canthal area - BCC many years ago. No probs with this site and no other new concerns. 3. Here for evaluation of multiple asx pigmented lesions on the trunk and extremities, present for many years; no change in size/shape/color of any lesions; no bleeding lesions. 4. F/u AK - central upper chest - bx 3-2020. No probs since. 1 new - R nasal tip - asx.    5, 6. F/u for facial dermatitis - better from baseline. flaring last year on the FH and cheeks - she attributes to mask use. Better recently though and has not needed much in terms of treatment. Hands better. Has triamcinolone to use at home as needed flares. Changed cleansers after patch testing and minimizing makeup. s/p TRUE test patch testing in 2019    Panel 18 (quaternium-15) with +  Panel 21 (formaldehye) with +  Panel 27 (tixocortol-21-pivalate) with ++    *Pruritic erythematous eruption on mainly face + upper chest and upper back - flared initially over summer 2017 and required prednisone in Sept 2017, cleared. Improved but then flared again  Improved bt didn't clear with subsequent prednisone. Doesn't use soap on the face.   Uses [employer] products - changed to [employer] products ~ 1 year ago and hasn't used recently. *Bx;d 3-2018 - read as subacute dermatitis w/ eos  *cleared with TAC after bx's and has had intermittent flares since last seen but much milder and clear with TAC  *still unsure of trigger    + family hx of skin cancer - NMSC    Review of Systems:  Gen: Feels well, good sense of health. Skin: No changing moles or lesions. No new rashes. Past Medical History, Family History, Surgical History, Medications and Allergies reviewed. Outpatient Medications Marked as Taking for the 1/9/23 encounter (Office Visit) with Nery Duggan MD   Medication Sig Dispense Refill    ciprofloxacin (CIPRO) 500 MG tablet Take 1 tablet by mouth 2 times daily 14 tablet 0    Compression Stockings MISC by Does not apply route Strength 20-30mmHg  Style:  Other pull up stockings- knee high  Extremity: bilateral  Donning aid recommended: No 1 each 0    triamcinolone (KENALOG) 0.1 % ointment Apply to affected area BID PRN flares 30 g 1    rosuvastatin (CRESTOR) 5 MG tablet Take 1 tablet by mouth daily 90 tablet 3    amLODIPine (NORVASC) 5 MG tablet Take 1 tablet by mouth daily 90 tablet 3    lisinopril (PRINIVIL;ZESTRIL) 40 MG tablet Take 1 tablet by mouth daily 90 tablet 3    vitamin D 25 MCG (1000 UT) CAPS Take 1 capsule by mouth daily       Omega-3 Fatty Acids (FISH OIL) 1000 MG CAPS Take 1,000 mg by mouth 2 times daily      Calcium Carbonate-Vitamin D (CALCIUM-D) 600-400 MG-UNIT TABS Take by mouth 2 times daily       Allergies   Allergen Reactions    Bactrim Rash       Past Medical History:   Diagnosis Date    Asymptomatic varicose veins     Family history of malignant neoplasm of gastrointestinal tract     Hyperlipidemia     Hypertension      Past Surgical History:   Procedure Laterality Date    FINGER AMPUTATION      left middle    HYSTERECTOMY (CERVIX STATUS UNKNOWN)      OTHER SURGICAL HISTORY      uterine prolapse       Physical Examination     Gen, well-appearing  FSE today    Face clear except for mild erythema  Scalp, hands/periungal area normal    trunk and extremities with scattered brown macules and papules   R medial canthal area clear    Central upper chest with scar - clear  R nasal tip with erythematous roughly scaled macule   Hands with minimal dryness today    *L shin - firm scarred skin          Has photos of more scaly erythematous edematous patches on the face with flares. Assessment and Plan     1. L lower medial shin - chronic wound finally healed s/p excision SCC 2-2022  2. Hx of NMSC, no signs recurrence  3. Benign-appearing nevi, SK's, lentigines  - Monitor for ABCD's of MM and si/sx of NMSC  Continue sun protection - OTC sunscreen with SPF 30-50+ recommended and reviewed usage  Encouraged skin check in 6 mos (sooner if indicated), self checks    4. AK - central upper chest - bx 3-2020 - clear  - cont sun protection  - R nasal tip - 1 new  - - 1 lesion(s) treated with liquid nitrogen with cryac or swab. Treated with 2 cycles for 1-5 seconds each after consent from patient. Patient educated on risk of blister, hypopigmentation/scar and wound care. Tolerated well. 5. Facial dermatitis - unclear etiology, stable/better recently  - ? Contact component but TRUE test +'s don't necessarily clinically correlate since TAC has helped  - resume TAC oint intermittently prn worsening flares; ed se/misuse and limited use  - consider elidel/protopic/eucrisa if needing more consistent steroid use  - re-eval if no improvement    6.  Hand dermatitis - from washing - stable  - TAC oint bid until clear and prn flares; ed se/misuse    F/u 6 mos and if all clear then, can go back to annual.

## 2023-01-10 ENCOUNTER — OFFICE VISIT (OUTPATIENT)
Dept: FAMILY MEDICINE CLINIC | Age: 73
End: 2023-01-10
Payer: MEDICARE

## 2023-01-10 VITALS
WEIGHT: 158.4 LBS | BODY MASS INDEX: 25.57 KG/M2 | DIASTOLIC BLOOD PRESSURE: 80 MMHG | OXYGEN SATURATION: 99 % | TEMPERATURE: 98.1 F | HEART RATE: 68 BPM | SYSTOLIC BLOOD PRESSURE: 138 MMHG

## 2023-01-10 DIAGNOSIS — Z23 NEEDS FLU SHOT: ICD-10-CM

## 2023-01-10 DIAGNOSIS — N18.31 STAGE 3A CHRONIC KIDNEY DISEASE (HCC): ICD-10-CM

## 2023-01-10 DIAGNOSIS — R73.9 HYPERGLYCEMIA: ICD-10-CM

## 2023-01-10 DIAGNOSIS — K57.92 DIVERTICULITIS: ICD-10-CM

## 2023-01-10 DIAGNOSIS — E78.2 MIXED HYPERLIPIDEMIA: ICD-10-CM

## 2023-01-10 DIAGNOSIS — Z13.1 SCREENING FOR DIABETES MELLITUS: ICD-10-CM

## 2023-01-10 DIAGNOSIS — H91.93 DECREASED HEARING OF BOTH EARS: ICD-10-CM

## 2023-01-10 DIAGNOSIS — I10 ESSENTIAL HYPERTENSION: Primary | ICD-10-CM

## 2023-01-10 PROBLEM — N18.30 CHRONIC RENAL DISEASE, STAGE III (HCC): Status: ACTIVE | Noted: 2023-01-10

## 2023-01-10 PROCEDURE — G8484 FLU IMMUNIZE NO ADMIN: HCPCS | Performed by: FAMILY MEDICINE

## 2023-01-10 PROCEDURE — 3075F SYST BP GE 130 - 139MM HG: CPT | Performed by: FAMILY MEDICINE

## 2023-01-10 PROCEDURE — G8427 DOCREV CUR MEDS BY ELIG CLIN: HCPCS | Performed by: FAMILY MEDICINE

## 2023-01-10 PROCEDURE — 1123F ACP DISCUSS/DSCN MKR DOCD: CPT | Performed by: FAMILY MEDICINE

## 2023-01-10 PROCEDURE — G0008 ADMIN INFLUENZA VIRUS VAC: HCPCS | Performed by: FAMILY MEDICINE

## 2023-01-10 PROCEDURE — 1090F PRES/ABSN URINE INCON ASSESS: CPT | Performed by: FAMILY MEDICINE

## 2023-01-10 PROCEDURE — 99214 OFFICE O/P EST MOD 30 MIN: CPT | Performed by: FAMILY MEDICINE

## 2023-01-10 PROCEDURE — 1036F TOBACCO NON-USER: CPT | Performed by: FAMILY MEDICINE

## 2023-01-10 PROCEDURE — 3017F COLORECTAL CA SCREEN DOC REV: CPT | Performed by: FAMILY MEDICINE

## 2023-01-10 PROCEDURE — 90694 VACC AIIV4 NO PRSRV 0.5ML IM: CPT | Performed by: FAMILY MEDICINE

## 2023-01-10 PROCEDURE — 3079F DIAST BP 80-89 MM HG: CPT | Performed by: FAMILY MEDICINE

## 2023-01-10 PROCEDURE — G8399 PT W/DXA RESULTS DOCUMENT: HCPCS | Performed by: FAMILY MEDICINE

## 2023-01-10 PROCEDURE — G8417 CALC BMI ABV UP PARAM F/U: HCPCS | Performed by: FAMILY MEDICINE

## 2023-01-10 ASSESSMENT — ENCOUNTER SYMPTOMS
SHORTNESS OF BREATH: 0
BACK PAIN: 0
ABDOMINAL PAIN: 0
RHINORRHEA: 0
DIARRHEA: 0
CONSTIPATION: 0

## 2023-01-10 ASSESSMENT — PATIENT HEALTH QUESTIONNAIRE - PHQ9
SUM OF ALL RESPONSES TO PHQ QUESTIONS 1-9: 0
1. LITTLE INTEREST OR PLEASURE IN DOING THINGS: 0
2. FEELING DOWN, DEPRESSED OR HOPELESS: 0
SUM OF ALL RESPONSES TO PHQ9 QUESTIONS 1 & 2: 0
SUM OF ALL RESPONSES TO PHQ QUESTIONS 1-9: 0

## 2023-03-23 DIAGNOSIS — K57.92 DIVERTICULITIS: ICD-10-CM

## 2023-03-23 DIAGNOSIS — N30.01 ACUTE CYSTITIS WITH HEMATURIA: ICD-10-CM

## 2023-03-23 RX ORDER — CIPROFLOXACIN 500 MG/1
500 TABLET, FILM COATED ORAL 2 TIMES DAILY
Qty: 14 TABLET | Refills: 0 | Status: SHIPPED | OUTPATIENT
Start: 2023-03-23

## 2023-03-23 RX ORDER — METRONIDAZOLE 500 MG/1
500 TABLET ORAL 3 TIMES DAILY
Qty: 30 TABLET | Refills: 0 | Status: SHIPPED | OUTPATIENT
Start: 2023-03-23 | End: 2023-04-02

## 2023-03-23 NOTE — TELEPHONE ENCOUNTER
----- Message from hospitals ELLA Hensley sent at 3/23/2023  2:37 PM EDT -----  Subject: Medication Problem    Medication: ciprofloxacin (CIPRO) 500 MG tablet  Dosage: 500 mg   Ordering Provider: Osmar Thapa. Question/Problem: The pt stated she is having a diverticulitis flare up   and stated that there are two medications that she takes for this issue   and would like to know if they can be sent to her pharmacy. The pt wasn't   completely sure of the name of the second medication. Please advise.       Pharmacy: [facility] [id number] - [facility], [facility] [place] [address] [place] [address] [phone]    ---------------------------------------------------------------------------  --------------  Jamison Duane INFO  [phone]; OK to leave message on voicemail  ---------------------------------------------------------------------------  --------------    SCRIPT ANSWERS  Relationship to Patient: Self

## 2023-05-24 ENCOUNTER — OFFICE VISIT (OUTPATIENT)
Dept: AUDIOLOGY | Age: 73
End: 2023-05-24

## 2023-05-24 DIAGNOSIS — H90.3 SENSORINEURAL HEARING LOSS, BILATERAL: Primary | ICD-10-CM

## 2023-05-24 PROCEDURE — 99999 PR OFFICE/OUTPT VISIT,PROCEDURE ONLY: CPT

## 2023-05-24 NOTE — PROGRESS NOTES
Christopher Hassan  [birth year]88 y.o. female   [id number]      [address] [place] [address]    Christopher Hassan was seen today, 5/24/2023 , for a Hearing Aid Evaluation following audiologic evaluation on 4/11/23. Patient has no prior history of hearing aid use. Patient does not have an insurance benefit for hearing aids. Patient is responsible for any cost over the previously listed benefit (if any). Hearing aid benefit worksheet scanned into media tab. DATE: 5/24/2023     PROCEDURES:     REVIEWED AUDIOGRAM AND HEARING EVALUATION RESULTS:            SOUNDFIELD TESTING:     Name of test Type of amplification Level of signal Level of noise % Correct        Nu-6  None  Phonak L90-RL 55dBHL N/A 68%  88%    QuickSIN None  Phonak L90-RL 70dBHL varied 9.5 SNR loss  4.5 SNR loss     LIFESTYLE EVALUATION:      How do you spend most of your day? Work, playing tennis, watching grand kids play at sporting events, watch TV    When is your hearing loss most problematic? Tv is too loud, having people ask to repeat    In what situation would you most like to hear better? Understanding others when they are not facing her, on the phone (tends to talk louder), hearing across tennis court    After a discussion of evaluation results, discussion of levels of technology and prices, and lifestyle assessment, Christopher Hassan has decided to consider the options and contact Audiology when a decision has been made. .     Technology to be considered: Premium vs Advanced. Picked color P1,  power 1M, large open dome, AU. Signed purchase agreement. Patient cost due at time of fitting: $3220/3104   PATIENT EDUCATION:      - Verbally reviewed benefits and limitations of devices and available features in hearing aids. - Verbally discussed realistic expectations of hearing aid use    - Verbally discussed importance of adaptation period with devices and corresponding technology levels.     Information was shared verbally with patient

## 2023-06-22 ENCOUNTER — TELEPHONE (OUTPATIENT)
Dept: FAMILY MEDICINE CLINIC | Age: 73
End: 2023-06-22

## 2023-06-22 DIAGNOSIS — N30.01 ACUTE CYSTITIS WITH HEMATURIA: ICD-10-CM

## 2023-06-22 DIAGNOSIS — K57.92 DIVERTICULITIS: ICD-10-CM

## 2023-06-22 RX ORDER — METRONIDAZOLE 500 MG/1
500 TABLET ORAL 3 TIMES DAILY
Qty: 30 TABLET | Refills: 0 | Status: SHIPPED | OUTPATIENT
Start: 2023-06-22 | End: 2023-07-02

## 2023-06-22 RX ORDER — CIPROFLOXACIN 500 MG/1
500 TABLET, FILM COATED ORAL 2 TIMES DAILY
Qty: 20 TABLET | Refills: 0 | Status: SHIPPED | OUTPATIENT
Start: 2023-06-22

## 2023-06-22 NOTE — TELEPHONE ENCOUNTER
Diverticulitis started last night. Dr Joel Ventura typically calls in these two meds for pt when this happens.     Please advise    ciprofloxacin (CIPRO) 500 MG tablet   metroNIDAZOLE (FLAGYL) 500 MG tablet      [facility] [id number] - [address], [address] [place] [address]   Phone:  [phone]  Fax:  [phone]

## 2023-06-22 NOTE — TELEPHONE ENCOUNTER
Will send. Looks as though the last time she took medications for this was in March - may consider follow up with GI with multiple flares in the past 6-7 months.

## 2023-07-07 DIAGNOSIS — R73.9 HYPERGLYCEMIA: ICD-10-CM

## 2023-07-07 DIAGNOSIS — E78.2 MIXED HYPERLIPIDEMIA: ICD-10-CM

## 2023-07-07 LAB
ALBUMIN SERPL-MCNC: 4.6 G/DL (ref 3.4–5)
ALBUMIN/GLOB SERPL: 1.6 {RATIO} (ref 1.1–2.2)
ALP SERPL-CCNC: 68 U/L (ref 40–129)
ALT SERPL-CCNC: 9 U/L (ref 10–40)
ANION GAP SERPL CALCULATED.3IONS-SCNC: 14 MMOL/L (ref 3–16)
AST SERPL-CCNC: 15 U/L (ref 15–37)
BASOPHILS # BLD: 0.1 K/UL (ref 0–0.2)
BASOPHILS NFR BLD: 0.8 %
BILIRUB SERPL-MCNC: 0.6 MG/DL (ref 0–1)
BUN SERPL-MCNC: 26 MG/DL (ref 7–20)
CALCIUM SERPL-MCNC: 9.5 MG/DL (ref 8.3–10.6)
CHLORIDE SERPL-SCNC: 105 MMOL/L (ref 99–110)
CHOLEST SERPL-MCNC: 160 MG/DL (ref 0–199)
CO2 SERPL-SCNC: 24 MMOL/L (ref 21–32)
CREAT SERPL-MCNC: 1.5 MG/DL (ref 0.6–1.2)
DEPRECATED RDW RBC AUTO: 14.2 % (ref 12.4–15.4)
EOSINOPHIL # BLD: 0.1 K/UL (ref 0–0.6)
EOSINOPHIL NFR BLD: 1.1 %
GFR SERPLBLD CREATININE-BSD FMLA CKD-EPI: 36 ML/MIN/{1.73_M2}
GLUCOSE P FAST SERPL-MCNC: 101 MG/DL (ref 70–99)
HCT VFR BLD AUTO: 38.6 % (ref 36–48)
HDLC SERPL-MCNC: 53 MG/DL (ref 40–60)
HGB BLD-MCNC: 13.2 G/DL (ref 12–16)
LDL CHOLESTEROL CALCULATED: 80 MG/DL
LYMPHOCYTES # BLD: 1.8 K/UL (ref 1–5.1)
LYMPHOCYTES NFR BLD: 30.2 %
MCH RBC QN AUTO: 31.4 PG (ref 26–34)
MCHC RBC AUTO-ENTMCNC: 34.2 G/DL (ref 31–36)
MCV RBC AUTO: 91.7 FL (ref 80–100)
MONOCYTES # BLD: 0.4 K/UL (ref 0–1.3)
MONOCYTES NFR BLD: 6.5 %
NEUTROPHILS # BLD: 3.7 K/UL (ref 1.7–7.7)
NEUTROPHILS NFR BLD: 61.4 %
PLATELET # BLD AUTO: 231 K/UL (ref 135–450)
PMV BLD AUTO: 9.7 FL (ref 5–10.5)
POTASSIUM SERPL-SCNC: 4.8 MMOL/L (ref 3.5–5.1)
PROT SERPL-MCNC: 7.5 G/DL (ref 6.4–8.2)
RBC # BLD AUTO: 4.21 M/UL (ref 4–5.2)
SODIUM SERPL-SCNC: 143 MMOL/L (ref 136–145)
TRIGL SERPL-MCNC: 135 MG/DL (ref 0–150)
TSH SERPL DL<=0.005 MIU/L-ACNC: 1.5 UIU/ML (ref 0.27–4.2)
VLDLC SERPL CALC-MCNC: 27 MG/DL
WBC # BLD AUTO: 6 K/UL (ref 4–11)

## 2023-07-08 LAB
EST. AVERAGE GLUCOSE BLD GHB EST-MCNC: 111.2 MG/DL
HBA1C MFR BLD: 5.5 %

## 2023-07-10 NOTE — PROGRESS NOTES
SUBJECTIVE:    Miah Hermosillo is a 67 y.o. female who presents for a follow up visit. Chief Complaint   Patient presents with    Medicare AWV    Hypertension        Hyperlipidemia  This is a chronic problem. The current episode started more than 1 year ago. Lipid results: Total cholesterol 160, triglycerides 135, HDL 53, LDL 80. Pertinent negatives include no chest pain or shortness of breath. Current antihyperlipidemic treatment includes statins. The current treatment provides significant improvement of lipids. Compliance problems include adherence to exercise and adherence to diet. Risk factors for coronary artery disease include dyslipidemia, post-menopausal, a sedentary lifestyle and hypertension. Hypertension  This is a chronic problem. The current episode started more than 1 year ago. The problem is controlled. Pertinent negatives include no chest pain or shortness of breath. Risk factors for coronary artery disease include dyslipidemia, sedentary lifestyle and post-menopausal state. Past treatments include ACE inhibitors and calcium channel blockers. The current treatment provides significant improvement. Compliance problems include exercise and diet. Hypertensive end-organ damage includes kidney disease. Patient's medications, allergies, past medical,surgical, social and family histories were reviewed and updated as appropriate. Past Medical History:   Diagnosis Date    Asymptomatic varicose veins     Family history of malignant neoplasm of gastrointestinal tract     Hyperlipidemia     Hypertension      Past Surgical History:   Procedure Laterality Date    FINGER AMPUTATION      left middle    HYSTERECTOMY (CERVIX STATUS UNKNOWN)      OTHER SURGICAL HISTORY      uterine prolapse     Family History   Problem Relation Age of Onset    Cancer Mother         lung.  colon, skin    High Blood Pressure Father         cerebral aneurysm    Cancer Maternal Grandmother     Cancer Maternal Grandfather

## 2023-07-11 ENCOUNTER — OFFICE VISIT (OUTPATIENT)
Dept: DERMATOLOGY | Age: 73
End: 2023-07-11

## 2023-07-11 ENCOUNTER — OFFICE VISIT (OUTPATIENT)
Dept: FAMILY MEDICINE CLINIC | Age: 73
End: 2023-07-11
Payer: MEDICARE

## 2023-07-11 VITALS
TEMPERATURE: 98.5 F | WEIGHT: 161.2 LBS | OXYGEN SATURATION: 97 % | SYSTOLIC BLOOD PRESSURE: 110 MMHG | DIASTOLIC BLOOD PRESSURE: 72 MMHG | BODY MASS INDEX: 25.91 KG/M2 | HEART RATE: 76 BPM | HEIGHT: 66 IN | RESPIRATION RATE: 16 BRPM

## 2023-07-11 DIAGNOSIS — I10 ESSENTIAL HYPERTENSION: Primary | ICD-10-CM

## 2023-07-11 DIAGNOSIS — Z23 NEED FOR VACCINATION: ICD-10-CM

## 2023-07-11 DIAGNOSIS — L30.9 DERMATITIS: ICD-10-CM

## 2023-07-11 DIAGNOSIS — Z85.828 HISTORY OF NONMELANOMA SKIN CANCER: Primary | ICD-10-CM

## 2023-07-11 DIAGNOSIS — N18.32 STAGE 3B CHRONIC KIDNEY DISEASE (HCC): ICD-10-CM

## 2023-07-11 DIAGNOSIS — E78.2 MIXED HYPERLIPIDEMIA: ICD-10-CM

## 2023-07-11 DIAGNOSIS — Z00.00 INITIAL MEDICARE ANNUAL WELLNESS VISIT: ICD-10-CM

## 2023-07-11 PROCEDURE — 1123F ACP DISCUSS/DSCN MKR DOCD: CPT | Performed by: FAMILY MEDICINE

## 2023-07-11 PROCEDURE — 90471 IMMUNIZATION ADMIN: CPT | Performed by: FAMILY MEDICINE

## 2023-07-11 PROCEDURE — G8427 DOCREV CUR MEDS BY ELIG CLIN: HCPCS | Performed by: FAMILY MEDICINE

## 2023-07-11 PROCEDURE — 3078F DIAST BP <80 MM HG: CPT | Performed by: FAMILY MEDICINE

## 2023-07-11 PROCEDURE — 90715 TDAP VACCINE 7 YRS/> IM: CPT | Performed by: FAMILY MEDICINE

## 2023-07-11 PROCEDURE — 1036F TOBACCO NON-USER: CPT | Performed by: FAMILY MEDICINE

## 2023-07-11 PROCEDURE — G8399 PT W/DXA RESULTS DOCUMENT: HCPCS | Performed by: FAMILY MEDICINE

## 2023-07-11 PROCEDURE — 99214 OFFICE O/P EST MOD 30 MIN: CPT | Performed by: FAMILY MEDICINE

## 2023-07-11 PROCEDURE — G8417 CALC BMI ABV UP PARAM F/U: HCPCS | Performed by: FAMILY MEDICINE

## 2023-07-11 PROCEDURE — 3074F SYST BP LT 130 MM HG: CPT | Performed by: FAMILY MEDICINE

## 2023-07-11 PROCEDURE — 3017F COLORECTAL CA SCREEN DOC REV: CPT | Performed by: FAMILY MEDICINE

## 2023-07-11 PROCEDURE — G0438 PPPS, INITIAL VISIT: HCPCS | Performed by: FAMILY MEDICINE

## 2023-07-11 PROCEDURE — 1090F PRES/ABSN URINE INCON ASSESS: CPT | Performed by: FAMILY MEDICINE

## 2023-07-11 RX ORDER — AMLODIPINE BESYLATE 5 MG/1
5 TABLET ORAL DAILY
Qty: 90 TABLET | Refills: 3 | Status: SHIPPED | OUTPATIENT
Start: 2023-07-11

## 2023-07-11 RX ORDER — ROSUVASTATIN CALCIUM 5 MG/1
5 TABLET, COATED ORAL DAILY
Qty: 90 TABLET | Refills: 3 | Status: SHIPPED | OUTPATIENT
Start: 2023-07-11

## 2023-07-11 RX ORDER — LISINOPRIL 40 MG/1
40 TABLET ORAL DAILY
Qty: 90 TABLET | Refills: 3 | Status: SHIPPED | OUTPATIENT
Start: 2023-07-11

## 2023-07-11 SDOH — ECONOMIC STABILITY: FOOD INSECURITY: WITHIN THE PAST 12 MONTHS, THE FOOD YOU BOUGHT JUST DIDN'T LAST AND YOU DIDN'T HAVE MONEY TO GET MORE.: NEVER TRUE

## 2023-07-11 SDOH — ECONOMIC STABILITY: INCOME INSECURITY: HOW HARD IS IT FOR YOU TO PAY FOR THE VERY BASICS LIKE FOOD, HOUSING, MEDICAL CARE, AND HEATING?: NOT HARD AT ALL

## 2023-07-11 SDOH — ECONOMIC STABILITY: FOOD INSECURITY: WITHIN THE PAST 12 MONTHS, YOU WORRIED THAT YOUR FOOD WOULD RUN OUT BEFORE YOU GOT MONEY TO BUY MORE.: NEVER TRUE

## 2023-07-11 SDOH — ECONOMIC STABILITY: HOUSING INSECURITY
IN THE LAST 12 MONTHS, WAS THERE A TIME WHEN YOU DID NOT HAVE A STEADY PLACE TO SLEEP OR SLEPT IN A SHELTER (INCLUDING NOW)?: NO

## 2023-07-11 ASSESSMENT — PATIENT HEALTH QUESTIONNAIRE - PHQ9
SUM OF ALL RESPONSES TO PHQ QUESTIONS 1-9: 0
SUM OF ALL RESPONSES TO PHQ9 QUESTIONS 1 & 2: 0
SUM OF ALL RESPONSES TO PHQ QUESTIONS 1-9: 0
SUM OF ALL RESPONSES TO PHQ QUESTIONS 1-9: 0
1. LITTLE INTEREST OR PLEASURE IN DOING THINGS: 0
2. FEELING DOWN, DEPRESSED OR HOPELESS: 0
SUM OF ALL RESPONSES TO PHQ QUESTIONS 1-9: 0

## 2023-07-11 ASSESSMENT — ENCOUNTER SYMPTOMS
BACK PAIN: 0
CHEST TIGHTNESS: 0
RHINORRHEA: 0
CONSTIPATION: 0
DIARRHEA: 0
SHORTNESS OF BREATH: 0

## 2023-07-11 ASSESSMENT — LIFESTYLE VARIABLES
HOW OFTEN DO YOU HAVE A DRINK CONTAINING ALCOHOL: MONTHLY OR LESS
HOW MANY STANDARD DRINKS CONTAINING ALCOHOL DO YOU HAVE ON A TYPICAL DAY: 1 OR 2

## 2023-07-11 NOTE — PROGRESS NOTES
[facility] Dermatology  Luz Leiva MD  [phone]      Sara Sinha  1950    67 y.o. female     Date of Visit: 7/11/2023    Last seen: 1-2023 for FSE    Chief Complaint: f/u dermatitis and f/u [facility]  Chief Complaint   Patient presents with    Follow-up     Facial dermatitis-no current treatment since after last visit     History of Present Illness:    1. F/u SCC - L shin s/p excision of SCC in this area 2-12-22. Original path read as SCC - well-diff, KA-type. Final excision read as no residual SCC within the excision. Had probs with prolonged healing of wound after small dehiscence. Saw wound care to assist with healing and finally healed. Surrounding skin is firm with scar tissue but no pain, no new concerns otw. Other NMSC. - R medial canthal area - BCC many years ago. No probs with this site and no other new concerns. 2. F/u for facial and hand dermatitis - remains overall clear since last seen. Was bad on the FH and cheeks previously but denies any recent probs. Has triamcinolone to use at home as needed flares but hasn't used. Changed cleansers after patch testing and minimizing makeup. s/p TRUE test patch testing in 2019    Panel 18 (quaternium-15) with +  Panel 21 (formaldehye) with +  Panel 27 (tixocortol-21-pivalate) with ++    *Pruritic erythematous eruption on mainly face + upper chest and upper back - flared initially over summer 2017 and required prednisone in Sept 2017, cleared. Improved but then flared again  Improved bt didn't clear with subsequent prednisone. Doesn't use soap on the face. Was using [employer] products - stopped. *Bx'd 3-2018 - read as subacute dermatitis w/ eos  *cleared with TAC after bx's and has had intermittent flares since last seen but much milder and clear with TAC  *still unsure of trigger    + family hx of skin cancer - NMSC    Review of Systems:  Gen: Feels well, good sense of health. Skin: No changing moles or lesions.

## 2023-10-23 ENCOUNTER — OFFICE VISIT (OUTPATIENT)
Dept: FAMILY MEDICINE CLINIC | Age: 73
End: 2023-10-23
Payer: MEDICARE

## 2023-10-23 DIAGNOSIS — H61.23 BILATERAL IMPACTED CERUMEN: Primary | ICD-10-CM

## 2023-10-23 PROCEDURE — G8417 CALC BMI ABV UP PARAM F/U: HCPCS | Performed by: NURSE PRACTITIONER

## 2023-10-23 PROCEDURE — G8427 DOCREV CUR MEDS BY ELIG CLIN: HCPCS | Performed by: NURSE PRACTITIONER

## 2023-10-23 PROCEDURE — 99211 OFF/OP EST MAY X REQ PHY/QHP: CPT | Performed by: NURSE PRACTITIONER

## 2023-11-26 DIAGNOSIS — U07.1 COVID: Primary | ICD-10-CM

## 2023-11-27 ENCOUNTER — TELEPHONE (OUTPATIENT)
Dept: FAMILY MEDICINE CLINIC | Age: 73
End: 2023-11-27

## 2023-11-27 NOTE — TELEPHONE ENCOUNTER
Those are normal SE of the med.  If thinks can tolerate to finish would try to finish but if SE too much ok to stop

## 2023-11-27 NOTE — TELEPHONE ENCOUNTER
Patient was started on Paxlovid over the weekend, tested positive for covid on Saturday. Is on a lower dose due to kidney function. Last night started with abd cramping , loose stool , vomiting (whit foam), has a very bad taste in her mouth that will not go away. She has not yet taken it today and wondering if she should continue it? Please advise.

## 2023-12-28 ENCOUNTER — HOSPITAL ENCOUNTER (OUTPATIENT)
Dept: MRI IMAGING | Age: 73
Discharge: HOME | End: 2023-12-28
Attending: NEUROLOGICAL SURGERY
Payer: MEDICARE

## 2023-12-28 DIAGNOSIS — D32.9 MENINGIOMA (HCC): ICD-10-CM

## 2023-12-28 PROCEDURE — 6360000004 HC RX CONTRAST MEDICATION: Performed by: NEUROLOGICAL SURGERY

## 2023-12-28 PROCEDURE — 2580000003 HC RX 258: Performed by: NEUROLOGICAL SURGERY

## 2023-12-28 PROCEDURE — A9579 GAD-BASE MR CONTRAST NOS,1ML: HCPCS | Performed by: NEUROLOGICAL SURGERY

## 2023-12-28 PROCEDURE — 70553 MRI BRAIN STEM W/O & W/DYE: CPT

## 2023-12-28 RX ORDER — SODIUM CHLORIDE 0.9 % (FLUSH) 0.9 %
5-40 SYRINGE (ML) INJECTION 2 TIMES DAILY
Status: DISCONTINUED | OUTPATIENT
Start: 2023-12-28 | End: 2023-12-29 | Stop reason: HOSPADM

## 2023-12-28 RX ADMIN — GADOTERIDOL 15 ML: 279.3 INJECTION, SOLUTION INTRAVENOUS at 09:30

## 2023-12-28 RX ADMIN — SODIUM CHLORIDE, PRESERVATIVE FREE 10 ML: 5 INJECTION INTRAVENOUS at 09:31

## 2024-01-12 DIAGNOSIS — E78.2 MIXED HYPERLIPIDEMIA: ICD-10-CM

## 2024-01-12 DIAGNOSIS — I10 ESSENTIAL HYPERTENSION: ICD-10-CM

## 2024-01-12 LAB
ALBUMIN SERPL-MCNC: 4.6 G/DL (ref 3.4–5)
ALBUMIN/GLOB SERPL: 1.7 {RATIO} (ref 1.1–2.2)
ALP SERPL-CCNC: 89 U/L (ref 40–129)
ALT SERPL-CCNC: 13 U/L (ref 10–40)
ANION GAP SERPL CALCULATED.3IONS-SCNC: 12 MMOL/L (ref 3–16)
AST SERPL-CCNC: 19 U/L (ref 15–37)
BASOPHILS # BLD: 0 K/UL (ref 0–0.2)
BASOPHILS NFR BLD: 0.5 %
BILIRUB SERPL-MCNC: 0.8 MG/DL (ref 0–1)
BUN SERPL-MCNC: 17 MG/DL (ref 7–20)
CALCIUM SERPL-MCNC: 8.8 MG/DL (ref 8.3–10.6)
CHLORIDE SERPL-SCNC: 106 MMOL/L (ref 99–110)
CHOLEST SERPL-MCNC: 159 MG/DL (ref 0–199)
CO2 SERPL-SCNC: 24 MMOL/L (ref 21–32)
CREAT SERPL-MCNC: 1 MG/DL (ref 0.6–1.2)
DEPRECATED RDW RBC AUTO: 13.9 % (ref 12.4–15.4)
EOSINOPHIL # BLD: 0.1 K/UL (ref 0–0.6)
EOSINOPHIL NFR BLD: 1.9 %
GFR SERPLBLD CREATININE-BSD FMLA CKD-EPI: 59 ML/MIN/{1.73_M2}
GLUCOSE P FAST SERPL-MCNC: 87 MG/DL (ref 70–99)
HCT VFR BLD AUTO: 39.4 % (ref 36–48)
HDLC SERPL-MCNC: 48 MG/DL (ref 40–60)
HGB BLD-MCNC: 13.4 G/DL (ref 12–16)
LDL CHOLESTEROL CALCULATED: 88 MG/DL
LYMPHOCYTES # BLD: 2 K/UL (ref 1–5.1)
LYMPHOCYTES NFR BLD: 37 %
MCH RBC QN AUTO: 31 PG (ref 26–34)
MCHC RBC AUTO-ENTMCNC: 34 G/DL (ref 31–36)
MCV RBC AUTO: 91.1 FL (ref 80–100)
MONOCYTES # BLD: 0.4 K/UL (ref 0–1.3)
MONOCYTES NFR BLD: 6.5 %
NEUTROPHILS # BLD: 2.9 K/UL (ref 1.7–7.7)
NEUTROPHILS NFR BLD: 54.1 %
PLATELET # BLD AUTO: 193 K/UL (ref 135–450)
PMV BLD AUTO: 10 FL (ref 5–10.5)
POTASSIUM SERPL-SCNC: 4.3 MMOL/L (ref 3.5–5.1)
PROT SERPL-MCNC: 7.3 G/DL (ref 6.4–8.2)
RBC # BLD AUTO: 4.33 M/UL (ref 4–5.2)
SODIUM SERPL-SCNC: 142 MMOL/L (ref 136–145)
TRIGL SERPL-MCNC: 114 MG/DL (ref 0–150)
VLDLC SERPL CALC-MCNC: 23 MG/DL
WBC # BLD AUTO: 5.4 K/UL (ref 4–11)

## 2024-01-15 PROBLEM — N18.31 STAGE 3A CHRONIC KIDNEY DISEASE (HCC): Status: ACTIVE | Noted: 2023-01-10

## 2024-01-15 NOTE — PROGRESS NOTES
SUBJECTIVE:    Teresa Michel is a 73 y.o. female who presents for a follow up visit.    Chief Complaint   Patient presents with    Hypertension    Follow-up     Discuss labs.        Hyperlipidemia  This is a chronic problem. The current episode started more than 1 year ago. Lipid results: Total chol 159, Trig 114, HDL 48, LDL88. Pertinent negatives include no chest pain or shortness of breath. Current antihyperlipidemic treatment includes statins. The current treatment provides significant improvement of lipids. Compliance problems include adherence to exercise and adherence to diet.  Risk factors for coronary artery disease include hypertension, dyslipidemia, post-menopausal and a sedentary lifestyle.   Hypertension  This is a chronic problem. The current episode started more than 1 year ago. The problem is controlled. Pertinent negatives include no chest pain, headaches or shortness of breath. Risk factors for coronary artery disease include dyslipidemia, sedentary lifestyle and post-menopausal state. Past treatments include ACE inhibitors and calcium channel blockers. The current treatment provides significant improvement.        Patient's medications, allergies, past medical,surgical, social and family histories were reviewed and updated as appropriate.     Past Medical History:   Diagnosis Date    Asymptomatic varicose veins     Family history of malignant neoplasm of gastrointestinal tract     Hyperlipidemia     Hypertension      Past Surgical History:   Procedure Laterality Date    FINGER AMPUTATION      left middle    HYSTERECTOMY (CERVIX STATUS UNKNOWN)      OTHER SURGICAL HISTORY      uterine prolapse     Family History   Problem Relation Age of Onset    Cancer Mother         lung. colon, skin    High Blood Pressure Father         cerebral aneurysm    Cancer Maternal Grandmother     Cancer Maternal Grandfather     High Cholesterol Paternal Grandmother     Stroke Paternal Grandmother     High Cholesterol

## 2024-01-16 ENCOUNTER — OFFICE VISIT (OUTPATIENT)
Dept: FAMILY MEDICINE CLINIC | Age: 74
End: 2024-01-16
Payer: MEDICARE

## 2024-01-16 VITALS
OXYGEN SATURATION: 97 % | BODY MASS INDEX: 27.12 KG/M2 | SYSTOLIC BLOOD PRESSURE: 124 MMHG | RESPIRATION RATE: 20 BRPM | HEART RATE: 80 BPM | DIASTOLIC BLOOD PRESSURE: 80 MMHG | WEIGHT: 168 LBS | TEMPERATURE: 97.3 F

## 2024-01-16 DIAGNOSIS — I10 ESSENTIAL HYPERTENSION: ICD-10-CM

## 2024-01-16 DIAGNOSIS — Z23 NEED FOR INFLUENZA VACCINATION: Primary | ICD-10-CM

## 2024-01-16 DIAGNOSIS — E78.2 MIXED HYPERLIPIDEMIA: ICD-10-CM

## 2024-01-16 DIAGNOSIS — N18.31 STAGE 3A CHRONIC KIDNEY DISEASE (HCC): ICD-10-CM

## 2024-01-16 DIAGNOSIS — D32.9 MENINGIOMA (HCC): ICD-10-CM

## 2024-01-16 PROCEDURE — 3017F COLORECTAL CA SCREEN DOC REV: CPT | Performed by: FAMILY MEDICINE

## 2024-01-16 PROCEDURE — G8417 CALC BMI ABV UP PARAM F/U: HCPCS | Performed by: FAMILY MEDICINE

## 2024-01-16 PROCEDURE — 3074F SYST BP LT 130 MM HG: CPT | Performed by: FAMILY MEDICINE

## 2024-01-16 PROCEDURE — G8427 DOCREV CUR MEDS BY ELIG CLIN: HCPCS | Performed by: FAMILY MEDICINE

## 2024-01-16 PROCEDURE — 90694 VACC AIIV4 NO PRSRV 0.5ML IM: CPT | Performed by: FAMILY MEDICINE

## 2024-01-16 PROCEDURE — 3079F DIAST BP 80-89 MM HG: CPT | Performed by: FAMILY MEDICINE

## 2024-01-16 PROCEDURE — G0008 ADMIN INFLUENZA VIRUS VAC: HCPCS | Performed by: FAMILY MEDICINE

## 2024-01-16 PROCEDURE — G8484 FLU IMMUNIZE NO ADMIN: HCPCS | Performed by: FAMILY MEDICINE

## 2024-01-16 PROCEDURE — 1123F ACP DISCUSS/DSCN MKR DOCD: CPT | Performed by: FAMILY MEDICINE

## 2024-01-16 PROCEDURE — 1036F TOBACCO NON-USER: CPT | Performed by: FAMILY MEDICINE

## 2024-01-16 PROCEDURE — 99214 OFFICE O/P EST MOD 30 MIN: CPT | Performed by: FAMILY MEDICINE

## 2024-01-16 PROCEDURE — 1090F PRES/ABSN URINE INCON ASSESS: CPT | Performed by: FAMILY MEDICINE

## 2024-01-16 PROCEDURE — G8399 PT W/DXA RESULTS DOCUMENT: HCPCS | Performed by: FAMILY MEDICINE

## 2024-01-16 ASSESSMENT — ENCOUNTER SYMPTOMS
DIARRHEA: 0
BACK PAIN: 0
SHORTNESS OF BREATH: 0
CONSTIPATION: 0
COUGH: 0
CHEST TIGHTNESS: 0
RHINORRHEA: 0

## 2024-01-16 ASSESSMENT — PATIENT HEALTH QUESTIONNAIRE - PHQ9
SUM OF ALL RESPONSES TO PHQ QUESTIONS 1-9: 0
SUM OF ALL RESPONSES TO PHQ QUESTIONS 1-9: 0
2. FEELING DOWN, DEPRESSED OR HOPELESS: 0
SUM OF ALL RESPONSES TO PHQ QUESTIONS 1-9: 0
SUM OF ALL RESPONSES TO PHQ QUESTIONS 1-9: 0
1. LITTLE INTEREST OR PLEASURE IN DOING THINGS: 0
SUM OF ALL RESPONSES TO PHQ9 QUESTIONS 1 & 2: 0

## 2024-01-23 ENCOUNTER — OFFICE VISIT (OUTPATIENT)
Dept: DERMATOLOGY | Age: 74
End: 2024-01-23
Payer: MEDICARE

## 2024-01-23 DIAGNOSIS — D22.9 MULTIPLE NEVI: ICD-10-CM

## 2024-01-23 DIAGNOSIS — L30.9 DERMATITIS: ICD-10-CM

## 2024-01-23 DIAGNOSIS — D48.5 NEOPLASM OF UNCERTAIN BEHAVIOR OF SKIN: ICD-10-CM

## 2024-01-23 DIAGNOSIS — L60.3 NAIL DYSTROPHY: ICD-10-CM

## 2024-01-23 DIAGNOSIS — L82.1 SEBORRHEIC KERATOSIS: ICD-10-CM

## 2024-01-23 DIAGNOSIS — L30.9 HAND DERMATITIS: ICD-10-CM

## 2024-01-23 DIAGNOSIS — L57.0 AK (ACTINIC KERATOSIS): ICD-10-CM

## 2024-01-23 DIAGNOSIS — Z85.828 HISTORY OF NONMELANOMA SKIN CANCER: Primary | ICD-10-CM

## 2024-01-23 DIAGNOSIS — L81.4 LENTIGINES: ICD-10-CM

## 2024-01-23 PROCEDURE — 3017F COLORECTAL CA SCREEN DOC REV: CPT | Performed by: DERMATOLOGY

## 2024-01-23 PROCEDURE — 1036F TOBACCO NON-USER: CPT | Performed by: DERMATOLOGY

## 2024-01-23 PROCEDURE — 99214 OFFICE O/P EST MOD 30 MIN: CPT | Performed by: DERMATOLOGY

## 2024-01-23 PROCEDURE — G8399 PT W/DXA RESULTS DOCUMENT: HCPCS | Performed by: DERMATOLOGY

## 2024-01-23 PROCEDURE — G8484 FLU IMMUNIZE NO ADMIN: HCPCS | Performed by: DERMATOLOGY

## 2024-01-23 PROCEDURE — 1123F ACP DISCUSS/DSCN MKR DOCD: CPT | Performed by: DERMATOLOGY

## 2024-01-23 PROCEDURE — G8417 CALC BMI ABV UP PARAM F/U: HCPCS | Performed by: DERMATOLOGY

## 2024-01-23 PROCEDURE — G8427 DOCREV CUR MEDS BY ELIG CLIN: HCPCS | Performed by: DERMATOLOGY

## 2024-01-23 PROCEDURE — 1090F PRES/ABSN URINE INCON ASSESS: CPT | Performed by: DERMATOLOGY

## 2024-01-23 RX ORDER — GENTAMICIN SULFATE 1 MG/G
OINTMENT TOPICAL
Qty: 15 G | Refills: 1 | Status: SHIPPED | OUTPATIENT
Start: 2024-01-23 | End: 2024-01-30

## 2024-01-23 NOTE — PROGRESS NOTES
[facility] Dermatology  Mckenna Marr MD  [phone]      Teresa Michel  1950    73 y.o. female     Date of Visit: 1/23/2024    Last seen: 1-2023 and 7-2023    Chief Complaint: lesions/moles, f/u skin cancer  Chief Complaint   Patient presents with    Skin Exam     History of Present Illness:    1. F/u SCC - L shin s/p excision of SCC in this area 2-12-22.  Original path read as SCC - well-diff, KA-type.  Final excision read as no residual SCC within the excision.  Had probs with prolonged healing of wound after small dehiscence.    Saw wound care to assist with healing - finally healed.  Surrounding skin is firm with scar tissue but no pain, no new concerns otw.    2. F/u other NMSC.  - R medial canthal area - BCC many years ago.  No probs with this site and no other new concerns.    3. Here for evaluation of multiple asx pigmented lesions on the trunk and extremities, present for many years; no change in size/shape/color of any lesions; no bleeding lesions.    4. F/u AK - central upper chest - bx 3-2020.  No probs since.  No new.    5, 6. F/u for facial dermatitis - better from baseline.  flaring in the past on the FH and cheeks - she attributed to mask use.    Better recently though and has not needed much in terms of treatment.  Hands better.  Has triamcinolone to use at home as needed flares.   Patch testing as noted below.    7. Notes a concerning area on the R shin - round sore spot then seemed to improve/go away, noticed initially right before Xmas.  Denies trauma.  No trx tried.    8. C/o changing in the nail of the L thumb x few mos.  No trx tried.  Asx.     Changed cleansers after patch testing and minimizing makeup.  s/p TRUE test patch testing in 2019    Panel 18 (quaternium-15) with +  Panel 21 (formaldehye) with +  Panel 27 (tixocortol-21-pivalate) with ++    *Pruritic erythematous eruption on mainly face + upper chest and upper back - flared initially over summer 2017 and

## 2024-03-18 ENCOUNTER — OFFICE VISIT (OUTPATIENT)
Dept: DERMATOLOGY | Age: 74
End: 2024-03-18
Payer: MEDICARE

## 2024-03-18 DIAGNOSIS — D48.5 NEOPLASM OF UNCERTAIN BEHAVIOR OF SKIN: Primary | ICD-10-CM

## 2024-03-18 DIAGNOSIS — L60.3 NAIL DYSTROPHY: ICD-10-CM

## 2024-03-18 PROCEDURE — G8484 FLU IMMUNIZE NO ADMIN: HCPCS | Performed by: DERMATOLOGY

## 2024-03-18 PROCEDURE — G8399 PT W/DXA RESULTS DOCUMENT: HCPCS | Performed by: DERMATOLOGY

## 2024-03-18 PROCEDURE — 99212 OFFICE O/P EST SF 10 MIN: CPT | Performed by: DERMATOLOGY

## 2024-03-18 PROCEDURE — 1123F ACP DISCUSS/DSCN MKR DOCD: CPT | Performed by: DERMATOLOGY

## 2024-03-18 PROCEDURE — 3017F COLORECTAL CA SCREEN DOC REV: CPT | Performed by: DERMATOLOGY

## 2024-03-18 PROCEDURE — G8417 CALC BMI ABV UP PARAM F/U: HCPCS | Performed by: DERMATOLOGY

## 2024-03-18 PROCEDURE — 11104 PUNCH BX SKIN SINGLE LESION: CPT | Performed by: DERMATOLOGY

## 2024-03-18 PROCEDURE — G8427 DOCREV CUR MEDS BY ELIG CLIN: HCPCS | Performed by: DERMATOLOGY

## 2024-03-18 PROCEDURE — 1036F TOBACCO NON-USER: CPT | Performed by: DERMATOLOGY

## 2024-03-18 PROCEDURE — 1090F PRES/ABSN URINE INCON ASSESS: CPT | Performed by: DERMATOLOGY

## 2024-03-18 NOTE — PROGRESS NOTES
[facility]  Mckenna Marr MD  [phone]      Teresa Michel  1950    73 y.o. female     Date of Visit: 3/18/2024    Last seen: 1-2024     Chief Complaint: lesion, nail  Chief Complaint   Patient presents with    Follow-up     Shin about the same  Fingernail about the same      History of Present Illness:    *see note from 1-2024 for all derm probs    1. Here for f/u for a concerning persistent firm area on the R shin - had a round sore spot then seemed to improve/go away, noticed initially right before Xmas.  Denies trauma.  No trx tried.  Has not really improved since last seen in January.    2.  Follow-up for discoloration of the nail of the L thumb -first noticed in fall 2023.had some concern for pseudomonal infection at last visit and started on gent ointment and vinegar soaks.  Not sure if she has had any improvement with using the since last seen.  Not consistent about the vinegar soaks but has been using the gent.  No other nails involved.      + family hx of skin cancer - NMSC    Review of Systems:  Gen: Feels well, good sense of health.     Past Medical History, Family History, Surgical History, Medications and Allergies reviewed.    Outpatient Medications Marked as Taking for the 3/18/24 encounter (Office Visit) with Mckenna Marr MD   Medication Sig Dispense Refill    rosuvastatin (CRESTOR) 5 MG tablet Take 1 tablet by mouth daily 90 tablet 3    amLODIPine (NORVASC) 5 MG tablet Take 1 tablet by mouth daily 90 tablet 3    lisinopril (PRINIVIL;ZESTRIL) 40 MG tablet Take 1 tablet by mouth daily 90 tablet 3    vitamin D 25 MCG (1000 UT) CAPS Take 1 capsule by mouth daily      Calcium Carbonate-Vitamin D (CALCIUM-D) 600-400 MG-UNIT TABS Take by mouth 2 times daily       Allergies   Allergen Reactions    Bactrim Rash       Past Medical History:   Diagnosis Date    Asymptomatic varicose veins     Family history of malignant neoplasm of gastrointestinal tract

## 2024-03-21 LAB
CPT CODE: NORMAL
CPT DISCLAIMER: NORMAL
DIAGNOSIS: NORMAL
MICROSCOPIC EXAMINATION: NORMAL
PERFORMING LOCATION: NORMAL
PLACE OF SERVICE: NORMAL

## 2024-03-22 ENCOUNTER — OFFICE VISIT (OUTPATIENT)
Dept: FAMILY MEDICINE CLINIC | Age: 74
End: 2024-03-22

## 2024-03-22 VITALS — HEART RATE: 75 BPM | TEMPERATURE: 98.6 F | OXYGEN SATURATION: 98 %

## 2024-03-22 DIAGNOSIS — B96.89 ACUTE BACTERIAL SINUSITIS: Primary | ICD-10-CM

## 2024-03-22 DIAGNOSIS — J01.90 ACUTE BACTERIAL SINUSITIS: Primary | ICD-10-CM

## 2024-03-22 RX ORDER — AMOXICILLIN AND CLAVULANATE POTASSIUM 875; 125 MG/1; MG/1
1 TABLET, FILM COATED ORAL 2 TIMES DAILY
Qty: 20 TABLET | Refills: 0 | Status: SHIPPED | OUTPATIENT
Start: 2024-03-22 | End: 2024-04-01

## 2024-03-22 ASSESSMENT — ENCOUNTER SYMPTOMS
COUGH: 0
NAUSEA: 0
DIARRHEA: 0
VOMITING: 0
SHORTNESS OF BREATH: 0

## 2024-03-22 NOTE — PROGRESS NOTES
Teresa Michel  : 1950  Encounter date: 3/22/2024    This is a 73 y.o. female who presents with  Chief Complaint   Patient presents with    Cough     History of present illness:    HPI   Presents to clinic today with concerns for sinus congestion/drainage along with chest congestion/coughing that started approximately one week prior.  Reports some tactile fevers/chills.  Denies any SOB/wheezing.  Has not taken any OTC medications for symptom relief. Reports discolored sinus drainage.  Overall has had persistent symptoms.  Has had recent sick contacts with .       Allergies   Allergen Reactions    Bactrim Rash     Current Outpatient Medications   Medication Sig Dispense Refill    amoxicillin-clavulanate (AUGMENTIN) 875-125 MG per tablet Take 1 tablet by mouth 2 times daily for 10 days 20 tablet 0    rosuvastatin (CRESTOR) 5 MG tablet Take 1 tablet by mouth daily 90 tablet 3    amLODIPine (NORVASC) 5 MG tablet Take 1 tablet by mouth daily 90 tablet 3    lisinopril (PRINIVIL;ZESTRIL) 40 MG tablet Take 1 tablet by mouth daily 90 tablet 3    vitamin D 25 MCG (1000 UT) CAPS Take 1 capsule by mouth daily      Calcium Carbonate-Vitamin D (CALCIUM-D) 600-400 MG-UNIT TABS Take by mouth 2 times daily       No current facility-administered medications for this visit.      Review of Systems   Constitutional:  Negative for activity change, appetite change, chills, fatigue and fever.   Respiratory:  Negative for cough and shortness of breath.    Cardiovascular:  Negative for chest pain and palpitations.   Gastrointestinal:  Negative for diarrhea, nausea and vomiting.     Past medical, surgical, family and social history were reviewed and updated with the patient.    Objective:    Pulse 75   Temp 98.6 °F (37 °C) (Tympanic)   SpO2 98%         BP Readings from Last 3 Encounters:   24 124/80   23 110/72   23 124/79     Wt Readings from Last 3 Encounters:   24 76.2 kg (168 lb)   23 73.1

## 2024-04-02 ENCOUNTER — TELEPHONE (OUTPATIENT)
Dept: DERMATOLOGY | Age: 74
End: 2024-04-02

## 2024-04-03 ENCOUNTER — NURSE ONLY (OUTPATIENT)
Dept: DERMATOLOGY | Age: 74
End: 2024-04-03

## 2024-04-03 DIAGNOSIS — Z48.02 VISIT FOR SUTURE REMOVAL: Primary | ICD-10-CM

## 2024-04-03 PROCEDURE — 99024 POSTOP FOLLOW-UP VISIT: CPT | Performed by: DERMATOLOGY

## 2024-05-10 ENCOUNTER — OFFICE VISIT (OUTPATIENT)
Dept: FAMILY MEDICINE CLINIC | Age: 74
End: 2024-05-10
Payer: MEDICARE

## 2024-05-10 VITALS — HEART RATE: 85 BPM | OXYGEN SATURATION: 98 % | TEMPERATURE: 99.3 F

## 2024-05-10 DIAGNOSIS — J01.90 ACUTE BACTERIAL SINUSITIS: Primary | ICD-10-CM

## 2024-05-10 DIAGNOSIS — R05.1 ACUTE COUGH: ICD-10-CM

## 2024-05-10 DIAGNOSIS — B96.89 ACUTE BACTERIAL SINUSITIS: Primary | ICD-10-CM

## 2024-05-10 PROCEDURE — G8417 CALC BMI ABV UP PARAM F/U: HCPCS | Performed by: NURSE PRACTITIONER

## 2024-05-10 PROCEDURE — 1090F PRES/ABSN URINE INCON ASSESS: CPT | Performed by: NURSE PRACTITIONER

## 2024-05-10 PROCEDURE — 3017F COLORECTAL CA SCREEN DOC REV: CPT | Performed by: NURSE PRACTITIONER

## 2024-05-10 PROCEDURE — 99213 OFFICE O/P EST LOW 20 MIN: CPT | Performed by: NURSE PRACTITIONER

## 2024-05-10 PROCEDURE — 1036F TOBACCO NON-USER: CPT | Performed by: NURSE PRACTITIONER

## 2024-05-10 PROCEDURE — G8428 CUR MEDS NOT DOCUMENT: HCPCS | Performed by: NURSE PRACTITIONER

## 2024-05-10 PROCEDURE — G8399 PT W/DXA RESULTS DOCUMENT: HCPCS | Performed by: NURSE PRACTITIONER

## 2024-05-10 PROCEDURE — 1123F ACP DISCUSS/DSCN MKR DOCD: CPT | Performed by: NURSE PRACTITIONER

## 2024-05-10 RX ORDER — DOXYCYCLINE HYCLATE 100 MG
100 TABLET ORAL 2 TIMES DAILY
Qty: 20 TABLET | Refills: 0 | Status: SHIPPED | OUTPATIENT
Start: 2024-05-10 | End: 2024-05-20

## 2024-05-10 RX ORDER — BENZONATATE 100 MG/1
100-200 CAPSULE ORAL 3 TIMES DAILY PRN
Qty: 60 CAPSULE | Refills: 0 | Status: SHIPPED | OUTPATIENT
Start: 2024-05-10 | End: 2024-05-17

## 2024-05-10 NOTE — PROGRESS NOTES
Teresa Michel  : 1950  Encounter date: 5/10/2024    This is a 73 y.o. female who presents with  Chief Complaint   Patient presents with    Cough     History of present illness:    HPI   Presents to clinic today with concerns for sinus congestion/drainage along with chest congestion/coughing that started approximately 6 days prior.  Per patient symptoms have been progressively worsened with soreness in throat and coughing, discolored drainage - yellow.  No wheezing.  Denies any fever/chills/muscle aches.  No OTC medications for symptom relief.  She was in a barn over the weekend and don't know if it triggered anything.  Recent sick contacts with family members.     Allergies   Allergen Reactions    Bactrim Rash     Current Outpatient Medications   Medication Sig Dispense Refill    rosuvastatin (CRESTOR) 5 MG tablet Take 1 tablet by mouth daily 90 tablet 3    amLODIPine (NORVASC) 5 MG tablet Take 1 tablet by mouth daily 90 tablet 3    lisinopril (PRINIVIL;ZESTRIL) 40 MG tablet Take 1 tablet by mouth daily 90 tablet 3    vitamin D 25 MCG (1000 UT) CAPS Take 1 capsule by mouth daily      Calcium Carbonate-Vitamin D (CALCIUM-D) 600-400 MG-UNIT TABS Take by mouth 2 times daily       No current facility-administered medications for this visit.      Review of Systems   Constitutional:  Negative for activity change, appetite change, chills, fatigue and fever.   Respiratory:  Positive for cough. Negative for shortness of breath.    Cardiovascular:  Negative for chest pain and palpitations.   Gastrointestinal:  Negative for diarrhea, nausea and vomiting.     Past medical, surgical, family and social history were reviewed and updated with the patient.    Objective:    Pulse 85   Temp 99.3 °F (37.4 °C) (Tympanic)   SpO2 98%         BP Readings from Last 3 Encounters:   24 124/80   23 110/72   23 124/79     Wt Readings from Last 3 Encounters:   24 76.2 kg (168 lb)   23 73.1 kg (161 lb

## 2024-07-13 NOTE — PROGRESS NOTES
SUBJECTIVE:    Teresa Michel is a 73 y.o. female who presents for a follow up visit.    Chief Complaint   Patient presents with    Medicare AWV    Follow-up        Hyperlipidemia  This is a chronic problem. The current episode started more than 1 year ago. Pertinent negatives include no chest pain or shortness of breath. Current antihyperlipidemic treatment includes statins. The current treatment provides significant improvement of lipids. Compliance problems include adherence to exercise and adherence to diet.  Risk factors for coronary artery disease include dyslipidemia, post-menopausal, a sedentary lifestyle and hypertension.   Hypertension  This is a chronic problem. The current episode started more than 1 year ago. The problem is controlled. Associated symptoms include neck pain. Pertinent negatives include no chest pain, headaches, palpitations or shortness of breath. Risk factors for coronary artery disease include sedentary lifestyle, post-menopausal state and dyslipidemia. Past treatments include ACE inhibitors and calcium channel blockers. The current treatment provides significant improvement. Compliance problems include exercise and diet.    Neck Pain   This is a new problem. The current episode started in the past 7 days. The problem occurs constantly. The problem has been gradually improving. The pain is associated with nothing. The pain is present in the right side. The quality of the pain is described as aching. The pain is mild. The symptoms are aggravated by position and twisting. Pertinent negatives include no chest pain, fever, headaches, pain with swallowing or trouble swallowing. The treatment provided moderate relief.        Patient's medications, allergies, past medical,surgical, social and family histories were reviewed and updated as appropriate.     Past Medical History:   Diagnosis Date    Asymptomatic varicose veins     Family history of malignant neoplasm of gastrointestinal tract

## 2024-07-15 DIAGNOSIS — E78.2 MIXED HYPERLIPIDEMIA: ICD-10-CM

## 2024-07-16 ENCOUNTER — OFFICE VISIT (OUTPATIENT)
Dept: FAMILY MEDICINE CLINIC | Age: 74
End: 2024-07-16
Payer: MEDICARE

## 2024-07-16 VITALS
BODY MASS INDEX: 27.56 KG/M2 | DIASTOLIC BLOOD PRESSURE: 72 MMHG | HEIGHT: 66 IN | WEIGHT: 171.5 LBS | HEART RATE: 100 BPM | TEMPERATURE: 97.5 F | OXYGEN SATURATION: 93 % | SYSTOLIC BLOOD PRESSURE: 122 MMHG

## 2024-07-16 DIAGNOSIS — Z00.00 MEDICARE ANNUAL WELLNESS VISIT, SUBSEQUENT: ICD-10-CM

## 2024-07-16 DIAGNOSIS — N18.31 STAGE 3A CHRONIC KIDNEY DISEASE (HCC): ICD-10-CM

## 2024-07-16 DIAGNOSIS — I10 ESSENTIAL HYPERTENSION: Primary | ICD-10-CM

## 2024-07-16 DIAGNOSIS — E78.2 MIXED HYPERLIPIDEMIA: ICD-10-CM

## 2024-07-16 PROBLEM — N17.9 AKI (ACUTE KIDNEY INJURY) (HCC): Status: RESOLVED | Noted: 2018-10-15 | Resolved: 2024-07-16

## 2024-07-16 LAB
ALBUMIN SERPL-MCNC: 4.4 G/DL (ref 3.4–5)
ALBUMIN/GLOB SERPL: 1.4 {RATIO} (ref 1.1–2.2)
ALP SERPL-CCNC: 88 U/L (ref 40–129)
ALT SERPL-CCNC: 16 U/L (ref 10–40)
ANION GAP SERPL CALCULATED.3IONS-SCNC: 17 MMOL/L (ref 3–16)
AST SERPL-CCNC: 18 U/L (ref 15–37)
BILIRUB SERPL-MCNC: 0.6 MG/DL (ref 0–1)
BUN SERPL-MCNC: 22 MG/DL (ref 7–20)
CALCIUM SERPL-MCNC: 9.6 MG/DL (ref 8.3–10.6)
CHLORIDE SERPL-SCNC: 104 MMOL/L (ref 99–110)
CHOLEST SERPL-MCNC: 150 MG/DL (ref 0–199)
CO2 SERPL-SCNC: 21 MMOL/L (ref 21–32)
CREAT SERPL-MCNC: 1.1 MG/DL (ref 0.6–1.2)
GFR SERPLBLD CREATININE-BSD FMLA CKD-EPI: 52 ML/MIN/{1.73_M2}
GLUCOSE P FAST SERPL-MCNC: 93 MG/DL (ref 70–99)
HDLC SERPL-MCNC: 43 MG/DL (ref 40–60)
LDL CHOLESTEROL: 82 MG/DL
POTASSIUM SERPL-SCNC: 5 MMOL/L (ref 3.5–5.1)
PROT SERPL-MCNC: 7.5 G/DL (ref 6.4–8.2)
SODIUM SERPL-SCNC: 142 MMOL/L (ref 136–145)
TRIGL SERPL-MCNC: 123 MG/DL (ref 0–150)
TSH SERPL DL<=0.005 MIU/L-ACNC: 2.87 UIU/ML (ref 0.27–4.2)
VLDLC SERPL CALC-MCNC: 25 MG/DL

## 2024-07-16 PROCEDURE — G8427 DOCREV CUR MEDS BY ELIG CLIN: HCPCS | Performed by: FAMILY MEDICINE

## 2024-07-16 PROCEDURE — 99214 OFFICE O/P EST MOD 30 MIN: CPT | Performed by: FAMILY MEDICINE

## 2024-07-16 PROCEDURE — G8399 PT W/DXA RESULTS DOCUMENT: HCPCS | Performed by: FAMILY MEDICINE

## 2024-07-16 PROCEDURE — 3078F DIAST BP <80 MM HG: CPT | Performed by: FAMILY MEDICINE

## 2024-07-16 PROCEDURE — G8417 CALC BMI ABV UP PARAM F/U: HCPCS | Performed by: FAMILY MEDICINE

## 2024-07-16 PROCEDURE — 3017F COLORECTAL CA SCREEN DOC REV: CPT | Performed by: FAMILY MEDICINE

## 2024-07-16 PROCEDURE — 3074F SYST BP LT 130 MM HG: CPT | Performed by: FAMILY MEDICINE

## 2024-07-16 PROCEDURE — 1090F PRES/ABSN URINE INCON ASSESS: CPT | Performed by: FAMILY MEDICINE

## 2024-07-16 PROCEDURE — 1036F TOBACCO NON-USER: CPT | Performed by: FAMILY MEDICINE

## 2024-07-16 PROCEDURE — G0439 PPPS, SUBSEQ VISIT: HCPCS | Performed by: FAMILY MEDICINE

## 2024-07-16 PROCEDURE — 1123F ACP DISCUSS/DSCN MKR DOCD: CPT | Performed by: FAMILY MEDICINE

## 2024-07-16 RX ORDER — ROSUVASTATIN CALCIUM 5 MG/1
5 TABLET, COATED ORAL DAILY
Qty: 90 TABLET | Refills: 3 | Status: SHIPPED | OUTPATIENT
Start: 2024-07-16

## 2024-07-16 RX ORDER — LISINOPRIL 40 MG/1
40 TABLET ORAL DAILY
Qty: 90 TABLET | Refills: 3 | Status: SHIPPED | OUTPATIENT
Start: 2024-07-16

## 2024-07-16 RX ORDER — AMLODIPINE BESYLATE 5 MG/1
5 TABLET ORAL DAILY
Qty: 90 TABLET | Refills: 3 | Status: SHIPPED | OUTPATIENT
Start: 2024-07-16

## 2024-07-16 SDOH — ECONOMIC STABILITY: FOOD INSECURITY: WITHIN THE PAST 12 MONTHS, YOU WORRIED THAT YOUR FOOD WOULD RUN OUT BEFORE YOU GOT MONEY TO BUY MORE.: NEVER TRUE

## 2024-07-16 SDOH — ECONOMIC STABILITY: FOOD INSECURITY: WITHIN THE PAST 12 MONTHS, THE FOOD YOU BOUGHT JUST DIDN'T LAST AND YOU DIDN'T HAVE MONEY TO GET MORE.: NEVER TRUE

## 2024-07-16 SDOH — ECONOMIC STABILITY: INCOME INSECURITY: HOW HARD IS IT FOR YOU TO PAY FOR THE VERY BASICS LIKE FOOD, HOUSING, MEDICAL CARE, AND HEATING?: NOT HARD AT ALL

## 2024-07-16 ASSESSMENT — ENCOUNTER SYMPTOMS
DIARRHEA: 0
BACK PAIN: 0
TROUBLE SWALLOWING: 0
RHINORRHEA: 0
CONSTIPATION: 0
EYE ITCHING: 0
SHORTNESS OF BREATH: 0
CHEST TIGHTNESS: 0

## 2024-07-16 ASSESSMENT — PATIENT HEALTH QUESTIONNAIRE - PHQ9
SUM OF ALL RESPONSES TO PHQ QUESTIONS 1-9: 0
1. LITTLE INTEREST OR PLEASURE IN DOING THINGS: NOT AT ALL
SUM OF ALL RESPONSES TO PHQ9 QUESTIONS 1 & 2: 0
2. FEELING DOWN, DEPRESSED OR HOPELESS: NOT AT ALL
SUM OF ALL RESPONSES TO PHQ QUESTIONS 1-9: 0

## 2024-07-16 NOTE — PATIENT INSTRUCTIONS
Learning About Being Active as an Older Adult  Why is being active important as you get older?     Being active is one of the best things you can do for your health. And it's never too late to start. Being active--or getting active, if you aren't already--has definite benefits. It can:  Give you more energy,  Keep your mind sharp.  Improve balance to reduce your risk of falls.  Help you manage chronic illness with fewer medicines.  No matter how old you are, how fit you are, or what health problems you have, there is a form of activity that will work for you. And the more physical activity you can do, the better your overall health will be.  What kinds of activity can help you stay healthy?  Being more active will make your daily activities easier. Physical activity includes planned exercise and things you do in daily life. There are four types of activity:  Aerobic.  Doing aerobic activity makes your heart and lungs strong.  Includes walking, dancing, and gardening.  Aim for at least 2½ hours spread throughout the week.  It improves your energy and can help you sleep better.  Muscle-strengthening.  This type of activity can help maintain muscle and strengthen bones.  Includes climbing stairs, using resistance bands, and lifting or carrying heavy loads.  Aim for at least twice a week.  It can help protect the knees and other joints.  Stretching.  Stretching gives you better range of motion in joints and muscles.  Includes upper arm stretches, calf stretches, and gentle yoga.  Aim for at least twice a week, preferably after your muscles are warmed up from other activities.  It can help you function better in daily life.  Balancing.  This helps you stay coordinated and have good posture.  Includes heel-to-toe walking, josie chi, and certain types of yoga.  Aim for at least 3 days a week.  It can reduce your risk of falling.  Even if you have a hard time meeting the recommendations, it's better to be more active

## 2024-07-29 ENCOUNTER — OFFICE VISIT (OUTPATIENT)
Dept: DERMATOLOGY | Age: 74
End: 2024-07-29
Payer: MEDICARE

## 2024-07-29 DIAGNOSIS — L30.9 HAND DERMATITIS: ICD-10-CM

## 2024-07-29 DIAGNOSIS — L81.4 LENTIGINES: ICD-10-CM

## 2024-07-29 DIAGNOSIS — D22.9 MULTIPLE NEVI: ICD-10-CM

## 2024-07-29 DIAGNOSIS — Z85.828 HISTORY OF NONMELANOMA SKIN CANCER: Primary | ICD-10-CM

## 2024-07-29 DIAGNOSIS — M79.3 PANNICULITIS: ICD-10-CM

## 2024-07-29 DIAGNOSIS — L82.1 SEBORRHEIC KERATOSIS: ICD-10-CM

## 2024-07-29 DIAGNOSIS — L30.9 DERMATITIS: ICD-10-CM

## 2024-07-29 DIAGNOSIS — L57.0 AK (ACTINIC KERATOSIS): ICD-10-CM

## 2024-07-29 PROCEDURE — G8427 DOCREV CUR MEDS BY ELIG CLIN: HCPCS | Performed by: DERMATOLOGY

## 2024-07-29 PROCEDURE — G8399 PT W/DXA RESULTS DOCUMENT: HCPCS | Performed by: DERMATOLOGY

## 2024-07-29 PROCEDURE — 3017F COLORECTAL CA SCREEN DOC REV: CPT | Performed by: DERMATOLOGY

## 2024-07-29 PROCEDURE — 99214 OFFICE O/P EST MOD 30 MIN: CPT | Performed by: DERMATOLOGY

## 2024-07-29 PROCEDURE — 1036F TOBACCO NON-USER: CPT | Performed by: DERMATOLOGY

## 2024-07-29 PROCEDURE — G8417 CALC BMI ABV UP PARAM F/U: HCPCS | Performed by: DERMATOLOGY

## 2024-07-29 PROCEDURE — 1090F PRES/ABSN URINE INCON ASSESS: CPT | Performed by: DERMATOLOGY

## 2024-07-29 PROCEDURE — 1123F ACP DISCUSS/DSCN MKR DOCD: CPT | Performed by: DERMATOLOGY

## 2024-07-29 PROCEDURE — 17000 DESTRUCT PREMALG LESION: CPT | Performed by: DERMATOLOGY

## 2024-07-29 NOTE — PROGRESS NOTES
[facility]  Mckenna Marr MD  [phone]      Teresa Michel  1950    73 y.o. female     Date of Visit: 7/29/2024    Last seen: 1-2024 for FSE and 4-2024 for lesion    Chief Complaint: lesions/moles, f/u skin cancer  Chief Complaint   Patient presents with    Skin Exam     History of Present Illness:    1. F/u SCC - L shin s/p excision of SCC in this area 2-12-22.  Original path read as SCC - well-diff, KA-type.  Final excision read as no residual SCC within the excision.  Had probs with prolonged healing of wound after small dehiscence.    Saw wound care to assist with healing - finally healed.  Surrounding skin is firm with scar tissue but no pain, no new concerns otw.    2. F/u other NMSC.  - R medial canthal area - BCC many years ago.  No probs with this site and no other new concerns.    3. Here for evaluation of multiple asx pigmented lesions on the trunk and extremities, present for many years; no change in size/shape/color of any lesions; no bleeding lesions.    4. F/u AK - central upper chest - bx 3-2020.  No probs since.  1 new - L nasal sidewall.    5, 6. F/u for facial dermatitis - better from baseline.  flaring in the past on the FH and cheeks - she attributed to mask use.    Better recently though and has not needed much in terms of treatment.  Hands better.  Has triamcinolone to use at home as needed flares.   Patch testing as noted below.    7. f/u for a concerning persistent firm area on the R shin - had a round sore spot then seemed to improve/go away, noticed initially right before Xmas 2023.  Denies trauma.    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  Better since then - now asx.  No new lesions.    Changed cleansers after patch testing and minimizing makeup.  s/p TRUE test patch testing in 2019    Panel 18 (quaternium-15) with +  Panel 21 (formaldehye)

## 2024-07-29 NOTE — PATIENT INSTRUCTIONS
Cryosurgery (Freezing) Wound Care Instructions    AFTER THE PROCEDURE:   You will notice swelling and redness around the site. This is normal.   You may experience a sharp or sore feeling for the next several days. For this discomfort, you may take acetaminophen (Tylenol©).   A blister may develop at the treated area, sometimes as soon as by the end of the day. After several days, the blister will subside and a scab will form.   If the area is bumped or traumatized during the first few days following freezing, you may develop bleeding into the blister, forming a blood blister. This is nothing to be alarmed about.  If the blister is tense, uncomfortable, or much larger than the site that was frozen, you may pop the blister along its edge with a sterile needle (boiled, heated under a flame, or cleaned with alcohol) to allow the fluid to drain out. If the blister does not bother you, no treatment is needed.   Do NOT peel off the top of the blister roof. It will act as a dressing on top of your wound.   WOUND CARE:   You may shower or bathe as usual, but avoid scrubbing the areas that have been frozen.    Cleanse the site twice a day with mild soapy water, and then apply a thin film of white petrolatum (Vaseline©).   You do not need to cover the area, but can if you prefer.   Do NOT allow the site to become dry or crusted, or attempt to dry it out with rubbing alcohol or hydrogen peroxide.   Continue this regimen until the area is pink and healed. Depending on the size and location of your cryosurgery site, healing may take 2 to 4 weeks.   The area may continue to be pink for several weeks, and over the next few months may become darker or lighter than the surrounding skin. This may be a permanent change.        Protecting Yourself From the Sun    Apply an over-the-counter broad spectrum water resistant sunscreen with an SPF of at least 30 to exposed areas of the skin. Don’t forget the ears and lips! Remember to reapply

## 2024-08-05 ENCOUNTER — TELEPHONE (OUTPATIENT)
Dept: DERMATOLOGY | Age: 74
End: 2024-08-05

## 2024-08-05 NOTE — TELEPHONE ENCOUNTER
Patient needs new prescription of Triamcinalone ointment. What she has is .  Please send to [facility] pharmacy in [place] on [phone] [facility].  She is having a flare and needs this asap.[phone]

## 2024-08-06 RX ORDER — TRIAMCINOLONE ACETONIDE 1 MG/G
OINTMENT TOPICAL
Qty: 30 G | Refills: 1 | Status: SHIPPED | OUTPATIENT
Start: 2024-08-06

## 2025-01-13 DIAGNOSIS — E78.2 MIXED HYPERLIPIDEMIA: ICD-10-CM

## 2025-01-13 DIAGNOSIS — I10 ESSENTIAL HYPERTENSION: ICD-10-CM

## 2025-01-14 ENCOUNTER — HOSPITAL ENCOUNTER (OUTPATIENT)
Dept: MRI IMAGING | Age: 75
Discharge: HOME | End: 2025-01-14
Attending: NEUROLOGICAL SURGERY
Payer: MEDICARE

## 2025-01-14 DIAGNOSIS — D32.9 MENINGIOMA (HCC): ICD-10-CM

## 2025-01-14 LAB
ALBUMIN SERPL-MCNC: 4.4 G/DL (ref 3.4–5)
ALBUMIN/GLOB SERPL: 1.8 {RATIO} (ref 1.1–2.2)
ALP SERPL-CCNC: 78 U/L (ref 40–129)
ALT SERPL-CCNC: 18 U/L (ref 10–40)
ANION GAP SERPL CALCULATED.3IONS-SCNC: 11 MMOL/L (ref 3–16)
AST SERPL-CCNC: 20 U/L (ref 15–37)
BASOPHILS # BLD: 0 K/UL (ref 0–0.2)
BASOPHILS NFR BLD: 0.8 %
BILIRUB SERPL-MCNC: 0.7 MG/DL (ref 0–1)
BUN SERPL-MCNC: 18 MG/DL (ref 7–20)
CALCIUM SERPL-MCNC: 9.2 MG/DL (ref 8.3–10.6)
CHLORIDE SERPL-SCNC: 107 MMOL/L (ref 99–110)
CHOLEST SERPL-MCNC: 160 MG/DL (ref 0–199)
CO2 SERPL-SCNC: 25 MMOL/L (ref 21–32)
CREAT SERPL-MCNC: 1 MG/DL (ref 0.6–1.2)
DEPRECATED RDW RBC AUTO: 14.7 % (ref 12.4–15.4)
EOSINOPHIL # BLD: 0.1 K/UL (ref 0–0.6)
EOSINOPHIL NFR BLD: 2.7 %
GFR SERPLBLD CREATININE-BSD FMLA CKD-EPI: 59 ML/MIN/{1.73_M2}
GLUCOSE P FAST SERPL-MCNC: 83 MG/DL (ref 70–99)
HCT VFR BLD AUTO: 40.9 % (ref 36–48)
HDLC SERPL-MCNC: 46 MG/DL (ref 40–60)
HGB BLD-MCNC: 13.4 G/DL (ref 12–16)
LDL CHOLESTEROL: 90 MG/DL
LYMPHOCYTES # BLD: 2.1 K/UL (ref 1–5.1)
LYMPHOCYTES NFR BLD: 38.8 %
MCH RBC QN AUTO: 31.1 PG (ref 26–34)
MCHC RBC AUTO-ENTMCNC: 32.7 G/DL (ref 31–36)
MCV RBC AUTO: 95.3 FL (ref 80–100)
MONOCYTES # BLD: 0.4 K/UL (ref 0–1.3)
MONOCYTES NFR BLD: 6.8 %
NEUTROPHILS # BLD: 2.7 K/UL (ref 1.7–7.7)
NEUTROPHILS NFR BLD: 50.9 %
PLATELET # BLD AUTO: 210 K/UL (ref 135–450)
PMV BLD AUTO: 10.4 FL (ref 5–10.5)
POTASSIUM SERPL-SCNC: 4.1 MMOL/L (ref 3.5–5.1)
PROT SERPL-MCNC: 6.9 G/DL (ref 6.4–8.2)
RBC # BLD AUTO: 4.29 M/UL (ref 4–5.2)
SODIUM SERPL-SCNC: 143 MMOL/L (ref 136–145)
TRIGL SERPL-MCNC: 121 MG/DL (ref 0–150)
VLDLC SERPL CALC-MCNC: 24 MG/DL
WBC # BLD AUTO: 5.3 K/UL (ref 4–11)

## 2025-01-14 PROCEDURE — 6360000004 HC RX CONTRAST MEDICATION: Performed by: NEUROLOGICAL SURGERY

## 2025-01-14 PROCEDURE — A9579 GAD-BASE MR CONTRAST NOS,1ML: HCPCS | Performed by: NEUROLOGICAL SURGERY

## 2025-01-14 PROCEDURE — 70553 MRI BRAIN STEM W/O & W/DYE: CPT

## 2025-01-14 RX ADMIN — GADOTERIDOL 16 ML: 279.3 INJECTION, SOLUTION INTRAVENOUS at 16:19

## 2025-01-15 NOTE — PROGRESS NOTES
appropriate.     Past Medical History:   Diagnosis Date    Asymptomatic varicose veins     Family history of malignant neoplasm of gastrointestinal tract     Hyperlipidemia     Hypertension      Past Surgical History:   Procedure Laterality Date    FINGER AMPUTATION      left middle    HYSTERECTOMY (CERVIX STATUS UNKNOWN)      OTHER SURGICAL HISTORY      uterine prolapse     Family History   Problem Relation Age of Onset    Cancer Mother         lung. colon, skin    High Blood Pressure Father         cerebral aneurysm    Cancer Maternal Grandmother     Cancer Maternal Grandfather     High Cholesterol Paternal Grandmother     Stroke Paternal Grandmother     High Cholesterol Paternal Grandfather     Stroke Paternal Grandfather      Social History     Tobacco Use    Smoking status: Never    Smokeless tobacco: Never   Substance Use Topics    Alcohol use: Yes     Alcohol/week: 0.0 standard drinks of alcohol     Comment: occ      Allergies   Allergen Reactions    Bactrim Rash     Current Outpatient Medications on File Prior to Visit   Medication Sig Dispense Refill    triamcinolone (KENALOG) 0.1 % ointment Apply to affected area BID PRN flares 30 g 1    lisinopril (PRINIVIL;ZESTRIL) 40 MG tablet Take 1 tablet by mouth daily 90 tablet 3    rosuvastatin (CRESTOR) 5 MG tablet Take 1 tablet by mouth daily 90 tablet 3    amLODIPine (NORVASC) 5 MG tablet Take 1 tablet by mouth daily 90 tablet 3    vitamin D 25 MCG (1000 UT) CAPS Take 1 capsule by mouth daily      Calcium Carbonate-Vitamin D (CALCIUM-D) 600-400 MG-UNIT TABS Take by mouth 2 times daily       No current facility-administered medications on file prior to visit.        Review of Systems   Constitutional:  Negative for activity change and fatigue.   HENT:  Positive for congestion and postnasal drip. Negative for rhinorrhea.    Respiratory:  Positive for cough.    Gastrointestinal:  Positive for anal bleeding (blood with wiping). Negative for constipation, diarrhea

## 2025-01-16 ENCOUNTER — OFFICE VISIT (OUTPATIENT)
Dept: FAMILY MEDICINE CLINIC | Age: 75
End: 2025-01-16
Payer: MEDICARE

## 2025-01-16 VITALS
HEART RATE: 76 BPM | WEIGHT: 162 LBS | DIASTOLIC BLOOD PRESSURE: 70 MMHG | OXYGEN SATURATION: 96 % | BODY MASS INDEX: 26.15 KG/M2 | TEMPERATURE: 97.5 F | SYSTOLIC BLOOD PRESSURE: 112 MMHG

## 2025-01-16 DIAGNOSIS — I10 ESSENTIAL HYPERTENSION: ICD-10-CM

## 2025-01-16 DIAGNOSIS — N18.31 STAGE 3A CHRONIC KIDNEY DISEASE (HCC): ICD-10-CM

## 2025-01-16 DIAGNOSIS — R05.2 SUBACUTE COUGH: ICD-10-CM

## 2025-01-16 DIAGNOSIS — E78.2 MIXED HYPERLIPIDEMIA: Primary | ICD-10-CM

## 2025-01-16 PROCEDURE — 3074F SYST BP LT 130 MM HG: CPT | Performed by: FAMILY MEDICINE

## 2025-01-16 PROCEDURE — G8399 PT W/DXA RESULTS DOCUMENT: HCPCS | Performed by: FAMILY MEDICINE

## 2025-01-16 PROCEDURE — 99214 OFFICE O/P EST MOD 30 MIN: CPT | Performed by: FAMILY MEDICINE

## 2025-01-16 PROCEDURE — 1090F PRES/ABSN URINE INCON ASSESS: CPT | Performed by: FAMILY MEDICINE

## 2025-01-16 PROCEDURE — G8427 DOCREV CUR MEDS BY ELIG CLIN: HCPCS | Performed by: FAMILY MEDICINE

## 2025-01-16 PROCEDURE — 1159F MED LIST DOCD IN RCRD: CPT | Performed by: FAMILY MEDICINE

## 2025-01-16 PROCEDURE — 3017F COLORECTAL CA SCREEN DOC REV: CPT | Performed by: FAMILY MEDICINE

## 2025-01-16 PROCEDURE — 1123F ACP DISCUSS/DSCN MKR DOCD: CPT | Performed by: FAMILY MEDICINE

## 2025-01-16 PROCEDURE — G8417 CALC BMI ABV UP PARAM F/U: HCPCS | Performed by: FAMILY MEDICINE

## 2025-01-16 PROCEDURE — 1036F TOBACCO NON-USER: CPT | Performed by: FAMILY MEDICINE

## 2025-01-16 PROCEDURE — 3078F DIAST BP <80 MM HG: CPT | Performed by: FAMILY MEDICINE

## 2025-01-16 RX ORDER — BENZONATATE 200 MG/1
200 CAPSULE ORAL 3 TIMES DAILY PRN
Qty: 30 CAPSULE | Refills: 0 | Status: SHIPPED | OUTPATIENT
Start: 2025-01-16 | End: 2025-01-26

## 2025-01-16 RX ORDER — METHYLPREDNISOLONE 4 MG/1
TABLET ORAL
Qty: 1 KIT | Refills: 0 | Status: SHIPPED | OUTPATIENT
Start: 2025-01-16 | End: 2025-01-22

## 2025-01-16 RX ORDER — DOXYCYCLINE HYCLATE 100 MG
100 TABLET ORAL 2 TIMES DAILY
Qty: 20 TABLET | Refills: 0 | Status: SHIPPED | OUTPATIENT
Start: 2025-01-16 | End: 2025-01-26

## 2025-01-16 SDOH — ECONOMIC STABILITY: FOOD INSECURITY: WITHIN THE PAST 12 MONTHS, YOU WORRIED THAT YOUR FOOD WOULD RUN OUT BEFORE YOU GOT MONEY TO BUY MORE.: NEVER TRUE

## 2025-01-16 SDOH — ECONOMIC STABILITY: FOOD INSECURITY: WITHIN THE PAST 12 MONTHS, THE FOOD YOU BOUGHT JUST DIDN'T LAST AND YOU DIDN'T HAVE MONEY TO GET MORE.: NEVER TRUE

## 2025-01-16 ASSESSMENT — ENCOUNTER SYMPTOMS
DIARRHEA: 0
COUGH: 1
ANAL BLEEDING: 1
CONSTIPATION: 0
RECTAL PAIN: 0
RHINORRHEA: 0

## 2025-01-16 ASSESSMENT — PATIENT HEALTH QUESTIONNAIRE - PHQ9
SUM OF ALL RESPONSES TO PHQ QUESTIONS 1-9: 0
1. LITTLE INTEREST OR PLEASURE IN DOING THINGS: NOT AT ALL
SUM OF ALL RESPONSES TO PHQ9 QUESTIONS 1 & 2: 0
SUM OF ALL RESPONSES TO PHQ QUESTIONS 1-9: 0
2. FEELING DOWN, DEPRESSED OR HOPELESS: NOT AT ALL

## 2025-01-30 ENCOUNTER — OFFICE VISIT (OUTPATIENT)
Age: 75
End: 2025-01-30
Payer: MEDICARE

## 2025-01-30 DIAGNOSIS — Z85.828 HISTORY OF NONMELANOMA SKIN CANCER: ICD-10-CM

## 2025-01-30 DIAGNOSIS — L82.1 SEBORRHEIC KERATOSIS: ICD-10-CM

## 2025-01-30 DIAGNOSIS — L57.0 AK (ACTINIC KERATOSIS): ICD-10-CM

## 2025-01-30 DIAGNOSIS — D22.9 MULTIPLE NEVI: ICD-10-CM

## 2025-01-30 DIAGNOSIS — D48.5 NEOPLASM OF UNCERTAIN BEHAVIOR OF SKIN: Primary | ICD-10-CM

## 2025-01-30 DIAGNOSIS — L30.9 DERMATITIS: ICD-10-CM

## 2025-01-30 DIAGNOSIS — L81.4 LENTIGINES: ICD-10-CM

## 2025-01-30 PROCEDURE — 99213 OFFICE O/P EST LOW 20 MIN: CPT | Performed by: DERMATOLOGY

## 2025-01-30 NOTE — PATIENT INSTRUCTIONS

## 2025-01-30 NOTE — PROGRESS NOTES
[facility]  Mckenna Marr MD  [phone]      Teresa Michel  1950    74 y.o. female     Date of Visit: 1/30/2025    Last seen: 1-2024 for FSE and 4-2024 for lesion    Chief Complaint: lesions/moles, f/u skin cancer  Chief Complaint   Patient presents with    Skin Exam     History of Present Illness:    1. F/u SCC - L shin s/p excision of SCC in this area 2-12-22.  Original path read as SCC - well-diff, KA-type.  Final excision read as no residual SCC within the excision.  Had probs with prolonged healing of wound after small dehiscence.    Saw wound care to assist with healing - finally healed.  Surrounding skin is firm with scar tissue but no pain, no new concerns otw.    Other NMSC.  - R medial canthal area - BCC many years ago.  No probs with this site and no other new concerns.    2. Here for evaluation of multiple asx pigmented lesions on the trunk and extremities, present for many years; no change in size/shape/color of any lesions; no bleeding lesions.    3. F/u AK - central upper chest - bx 3-2020.  No probs since.  1 new - L nasal sidewall.    4, 5. F/u for facial dermatitis - better from baseline.  flaring in the past on the FH and cheeks - she attributed to mask use.    Better recently though and has not needed much in terms of treatment.  Hands better.  Has triamcinolone to use at home as needed flares.   Patch testing as noted below.    6. f/u for a concerning persistent firm area on the R shin - had a round sore spot then seemed to improve/go away, noticed initially right before Xmas 2023.  Denies trauma.    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  Better since then - now asx.  No new lesions.    7.  She has a concerning sensitive lesion on the upper chest.  This has been present for at least a couple months.      Dermatitis Hx:  Changed cleansers after patch  Add 29961 Cpt? (Important Note: In 2017 The Use Of 35680 Is Being Tracked By Cms To Determine Future Global Period Reimbursement For Global Periods): no Detail Level: Detailed

## 2025-02-04 LAB — DERMATOLOGY PATHOLOGY REPORT: NORMAL

## 2025-03-25 ENCOUNTER — TELEPHONE (OUTPATIENT)
Dept: FAMILY MEDICINE CLINIC | Age: 75
End: 2025-03-25

## 2025-03-25 DIAGNOSIS — N30.01 ACUTE CYSTITIS WITH HEMATURIA: ICD-10-CM

## 2025-03-25 RX ORDER — CIPROFLOXACIN 500 MG/1
500 TABLET, FILM COATED ORAL 2 TIMES DAILY
Qty: 14 TABLET | Refills: 0 | Status: SHIPPED | OUTPATIENT
Start: 2025-03-25

## 2025-03-25 NOTE — TELEPHONE ENCOUNTER
Patient c/o frequent urination, fatigue, and low back pain on left side x 2 days  Did an at home UTI test that was positive.  Would like to know if ATB can be sent in.    Traci Shanks

## 2025-06-26 ENCOUNTER — OFFICE VISIT (OUTPATIENT)
Dept: FAMILY MEDICINE CLINIC | Age: 75
End: 2025-06-26

## 2025-06-26 VITALS — TEMPERATURE: 97.1 F

## 2025-06-26 DIAGNOSIS — N18.32 STAGE 3B CHRONIC KIDNEY DISEASE (HCC): ICD-10-CM

## 2025-06-26 DIAGNOSIS — H10.33 ACUTE CONJUNCTIVITIS OF BOTH EYES, UNSPECIFIED ACUTE CONJUNCTIVITIS TYPE: Primary | ICD-10-CM

## 2025-06-26 RX ORDER — ERYTHROMYCIN 5 MG/G
OINTMENT OPHTHALMIC
Qty: 3.5 G | Refills: 1 | Status: SHIPPED | OUTPATIENT
Start: 2025-06-26 | End: 2025-07-06

## 2025-06-26 ASSESSMENT — ENCOUNTER SYMPTOMS
EYE ITCHING: 1
PHOTOPHOBIA: 0
DOUBLE VISION: 0
EYE PAIN: 0
EYE DISCHARGE: 1

## 2025-06-26 ASSESSMENT — VISUAL ACUITY: OU: 1

## 2025-06-26 NOTE — PROGRESS NOTES
SUBJECTIVE:    Teresa Michel is a 74 y.o. female who presents for a follow up visit.    Chief Complaint   Patient presents with    Conjunctivitis        Conjunctivitis   The current episode started 2 days ago. The onset was sudden. The problem has been gradually worsening. The problem is mild. Nothing relieves the symptoms. Associated symptoms include eye itching, hearing loss and eye discharge. Pertinent negatives include no fever, no decreased vision, no double vision, no photophobia, no congestion, no ear discharge, no ear pain, no headaches and no eye pain.   Patient's daughter has similar symptoms    Patient's medications, allergies, past medical,surgical, social and family histories were reviewed and updated as appropriate.     Past Medical History:   Diagnosis Date    Asymptomatic varicose veins     Family history of malignant neoplasm of gastrointestinal tract     Hyperlipidemia     Hypertension      Past Surgical History:   Procedure Laterality Date    FINGER AMPUTATION      left middle    HYSTERECTOMY (CERVIX STATUS UNKNOWN)      OTHER SURGICAL HISTORY      uterine prolapse     Family History   Problem Relation Age of Onset    Cancer Mother         lung. colon, skin    High Blood Pressure Father         cerebral aneurysm    Cancer Maternal Grandmother     Cancer Maternal Grandfather     High Cholesterol Paternal Grandmother     Stroke Paternal Grandmother     High Cholesterol Paternal Grandfather     Stroke Paternal Grandfather      Social History     Tobacco Use    Smoking status: Never    Smokeless tobacco: Never   Substance Use Topics    Alcohol use: Yes     Alcohol/week: 0.0 standard drinks of alcohol     Comment: occ      Allergies   Allergen Reactions    Bactrim Rash     Current Outpatient Medications on File Prior to Visit   Medication Sig Dispense Refill    ciprofloxacin (CIPRO) 500 MG tablet Take 1 tablet by mouth 2 times daily 14 tablet 0    triamcinolone (KENALOG) 0.1 % ointment Apply to

## 2025-07-21 DIAGNOSIS — E78.2 MIXED HYPERLIPIDEMIA: ICD-10-CM

## 2025-07-21 LAB
ALBUMIN SERPL-MCNC: 4.2 G/DL (ref 3.4–5)
ALBUMIN/GLOB SERPL: 1.7 {RATIO} (ref 1.1–2.2)
ALP SERPL-CCNC: 79 U/L (ref 40–129)
ALT SERPL-CCNC: 17 U/L (ref 10–40)
ANION GAP SERPL CALCULATED.3IONS-SCNC: 11 MMOL/L (ref 3–16)
AST SERPL-CCNC: 21 U/L (ref 15–37)
BASOPHILS # BLD: 0 K/UL (ref 0–0.2)
BASOPHILS NFR BLD: 1 %
BILIRUB SERPL-MCNC: 0.7 MG/DL (ref 0–1)
BUN SERPL-MCNC: 20 MG/DL (ref 7–20)
CALCIUM SERPL-MCNC: 9.1 MG/DL (ref 8.3–10.6)
CHLORIDE SERPL-SCNC: 107 MMOL/L (ref 99–110)
CHOLEST SERPL-MCNC: 154 MG/DL (ref 0–199)
CO2 SERPL-SCNC: 23 MMOL/L (ref 21–32)
CREAT SERPL-MCNC: 1 MG/DL (ref 0.6–1.2)
DEPRECATED RDW RBC AUTO: 14.6 % (ref 12.4–15.4)
EOSINOPHIL # BLD: 0.1 K/UL (ref 0–0.6)
EOSINOPHIL NFR BLD: 2.4 %
GFR SERPLBLD CREATININE-BSD FMLA CKD-EPI: 58 ML/MIN/{1.73_M2}
GLUCOSE P FAST SERPL-MCNC: 91 MG/DL (ref 70–99)
HCT VFR BLD AUTO: 40.3 % (ref 36–48)
HDLC SERPL-MCNC: 46 MG/DL (ref 40–60)
HGB BLD-MCNC: 13.6 G/DL (ref 12–16)
LDL CHOLESTEROL: 90 MG/DL
LYMPHOCYTES # BLD: 1.8 K/UL (ref 1–5.1)
LYMPHOCYTES NFR BLD: 40.3 %
MCH RBC QN AUTO: 31.1 PG (ref 26–34)
MCHC RBC AUTO-ENTMCNC: 33.8 G/DL (ref 31–36)
MCV RBC AUTO: 92 FL (ref 80–100)
MONOCYTES # BLD: 0.4 K/UL (ref 0–1.3)
MONOCYTES NFR BLD: 8.2 %
NEUTROPHILS # BLD: 2.1 K/UL (ref 1.7–7.7)
NEUTROPHILS NFR BLD: 48.1 %
PLATELET # BLD AUTO: 192 K/UL (ref 135–450)
PMV BLD AUTO: 10.2 FL (ref 5–10.5)
POTASSIUM SERPL-SCNC: 4.3 MMOL/L (ref 3.5–5.1)
PROT SERPL-MCNC: 6.7 G/DL (ref 6.4–8.2)
RBC # BLD AUTO: 4.38 M/UL (ref 4–5.2)
SODIUM SERPL-SCNC: 141 MMOL/L (ref 136–145)
TRIGL SERPL-MCNC: 90 MG/DL (ref 0–150)
TSH SERPL DL<=0.005 MIU/L-ACNC: 2.04 UIU/ML (ref 0.27–4.2)
VLDLC SERPL CALC-MCNC: 18 MG/DL
WBC # BLD AUTO: 4.4 K/UL (ref 4–11)

## 2025-07-22 SDOH — HEALTH STABILITY: PHYSICAL HEALTH: ON AVERAGE, HOW MANY DAYS PER WEEK DO YOU ENGAGE IN MODERATE TO STRENUOUS EXERCISE (LIKE A BRISK WALK)?: 2 DAYS

## 2025-07-22 SDOH — HEALTH STABILITY: PHYSICAL HEALTH: ON AVERAGE, HOW MANY MINUTES DO YOU ENGAGE IN EXERCISE AT THIS LEVEL?: 120 MIN

## 2025-07-22 ASSESSMENT — PATIENT HEALTH QUESTIONNAIRE - PHQ9
SUM OF ALL RESPONSES TO PHQ QUESTIONS 1-9: 0
8. MOVING OR SPEAKING SO SLOWLY THAT OTHER PEOPLE COULD HAVE NOTICED. OR THE OPPOSITE, BEING SO FIGETY OR RESTLESS THAT YOU HAVE BEEN MOVING AROUND A LOT MORE THAN USUAL: NOT AT ALL
7. TROUBLE CONCENTRATING ON THINGS, SUCH AS READING THE NEWSPAPER OR WATCHING TELEVISION: NOT AT ALL
SUM OF ALL RESPONSES TO PHQ QUESTIONS 1-9: 0
1. LITTLE INTEREST OR PLEASURE IN DOING THINGS: NOT AT ALL
3. TROUBLE FALLING OR STAYING ASLEEP: NOT AT ALL
5. POOR APPETITE OR OVEREATING: NOT AT ALL
4. FEELING TIRED OR HAVING LITTLE ENERGY: NOT AT ALL
2. FEELING DOWN, DEPRESSED OR HOPELESS: NOT AT ALL
SUM OF ALL RESPONSES TO PHQ QUESTIONS 1-9: 0
9. THOUGHTS THAT YOU WOULD BE BETTER OFF DEAD, OR OF HURTING YOURSELF: NOT AT ALL
SUM OF ALL RESPONSES TO PHQ QUESTIONS 1-9: 0
10. IF YOU CHECKED OFF ANY PROBLEMS, HOW DIFFICULT HAVE THESE PROBLEMS MADE IT FOR YOU TO DO YOUR WORK, TAKE CARE OF THINGS AT HOME, OR GET ALONG WITH OTHER PEOPLE: NOT DIFFICULT AT ALL
6. FEELING BAD ABOUT YOURSELF - OR THAT YOU ARE A FAILURE OR HAVE LET YOURSELF OR YOUR FAMILY DOWN: NOT AT ALL

## 2025-07-22 ASSESSMENT — LIFESTYLE VARIABLES
HOW OFTEN DO YOU HAVE A DRINK CONTAINING ALCOHOL: 2
HOW MANY STANDARD DRINKS CONTAINING ALCOHOL DO YOU HAVE ON A TYPICAL DAY: 1 OR 2
HOW OFTEN DO YOU HAVE A DRINK CONTAINING ALCOHOL: MONTHLY OR LESS
HOW OFTEN DO YOU HAVE SIX OR MORE DRINKS ON ONE OCCASION: 1
HOW MANY STANDARD DRINKS CONTAINING ALCOHOL DO YOU HAVE ON A TYPICAL DAY: 1

## 2025-07-24 NOTE — PROGRESS NOTES
SUBJECTIVE:    Teresa Michel is a 74 y.o. female who presents for a follow up visit.    Chief Complaint   Patient presents with    Medicare AWV        Hyperlipidemia  This is a chronic problem. The current episode started more than 1 year ago. Lipid results: Total cholesterol 154, triglycerides 90, HDL 46, LDL 90. Pertinent negatives include no chest pain or shortness of breath. Current antihyperlipidemic treatment includes statins. The current treatment provides significant improvement of lipids. Compliance problems include adherence to exercise and adherence to diet.  Risk factors for coronary artery disease include dyslipidemia, hypertension, a sedentary lifestyle and post-menopausal.   Hypertension  This is a chronic problem. The current episode started more than 1 year ago. The problem is controlled. Pertinent negatives include no chest pain or shortness of breath. Risk factors for coronary artery disease include sedentary lifestyle, post-menopausal state and dyslipidemia. Past treatments include ACE inhibitors and calcium channel blockers. The current treatment provides significant improvement. Compliance problems include exercise and diet.         Patient's medications, allergies, past medical,surgical, social and family histories were reviewed and updated as appropriate.     Past Medical History:   Diagnosis Date    Asymptomatic varicose veins     Family history of malignant neoplasm of gastrointestinal tract     Hyperlipidemia     Hypertension      Past Surgical History:   Procedure Laterality Date    FINGER AMPUTATION      left middle    HYSTERECTOMY (CERVIX STATUS UNKNOWN)      OTHER SURGICAL HISTORY      uterine prolapse     Family History   Problem Relation Age of Onset    Cancer Mother         lung. colon, skin    High Blood Pressure Father         cerebral aneurysm    Cancer Maternal Grandmother     Cancer Maternal Grandfather     High Cholesterol Paternal Grandmother     Stroke Paternal

## 2025-07-25 ENCOUNTER — OFFICE VISIT (OUTPATIENT)
Dept: FAMILY MEDICINE CLINIC | Age: 75
End: 2025-07-25

## 2025-07-25 VITALS
HEIGHT: 66 IN | OXYGEN SATURATION: 97 % | WEIGHT: 166 LBS | HEART RATE: 71 BPM | DIASTOLIC BLOOD PRESSURE: 70 MMHG | SYSTOLIC BLOOD PRESSURE: 112 MMHG | BODY MASS INDEX: 26.68 KG/M2 | TEMPERATURE: 97 F

## 2025-07-25 DIAGNOSIS — E78.2 MIXED HYPERLIPIDEMIA: Primary | ICD-10-CM

## 2025-07-25 DIAGNOSIS — Z00.00 MEDICARE ANNUAL WELLNESS VISIT, SUBSEQUENT: ICD-10-CM

## 2025-07-25 DIAGNOSIS — I10 ESSENTIAL HYPERTENSION: ICD-10-CM

## 2025-07-25 RX ORDER — ROSUVASTATIN CALCIUM 5 MG/1
5 TABLET, COATED ORAL DAILY
Qty: 90 TABLET | Refills: 3 | Status: SHIPPED | OUTPATIENT
Start: 2025-07-25

## 2025-07-25 RX ORDER — AMLODIPINE BESYLATE 5 MG/1
5 TABLET ORAL DAILY
Qty: 90 TABLET | Refills: 3 | Status: SHIPPED | OUTPATIENT
Start: 2025-07-25

## 2025-07-25 RX ORDER — LISINOPRIL 40 MG/1
40 TABLET ORAL DAILY
Qty: 90 TABLET | Refills: 3 | Status: SHIPPED | OUTPATIENT
Start: 2025-07-25

## 2025-07-25 ASSESSMENT — ENCOUNTER SYMPTOMS
DIARRHEA: 0
CONSTIPATION: 0
BACK PAIN: 0
SHORTNESS OF BREATH: 0
RHINORRHEA: 0

## 2025-07-31 ENCOUNTER — OFFICE VISIT (OUTPATIENT)
Age: 75
End: 2025-07-31
Payer: MEDICARE

## 2025-07-31 DIAGNOSIS — D48.5 NEOPLASM OF UNCERTAIN BEHAVIOR OF SKIN: ICD-10-CM

## 2025-07-31 DIAGNOSIS — L30.9 DERMATITIS: ICD-10-CM

## 2025-07-31 DIAGNOSIS — D22.9 MULTIPLE NEVI: ICD-10-CM

## 2025-07-31 DIAGNOSIS — L82.1 SEBORRHEIC KERATOSIS: ICD-10-CM

## 2025-07-31 DIAGNOSIS — Z85.828 HISTORY OF NONMELANOMA SKIN CANCER: Primary | ICD-10-CM

## 2025-07-31 DIAGNOSIS — L81.4 LENTIGINES: ICD-10-CM

## 2025-07-31 PROCEDURE — 99213 OFFICE O/P EST LOW 20 MIN: CPT | Performed by: DERMATOLOGY

## 2025-07-31 PROCEDURE — 11102 TANGNTL BX SKIN SINGLE LES: CPT | Performed by: DERMATOLOGY

## 2025-07-31 RX ORDER — TRIAMCINOLONE ACETONIDE 1 MG/G
OINTMENT TOPICAL
Qty: 30 G | Refills: 1 | Status: SHIPPED | OUTPATIENT
Start: 2025-07-31 | End: 2025-07-31 | Stop reason: SDUPTHER

## 2025-07-31 RX ORDER — TRIAMCINOLONE ACETONIDE 1 MG/G
OINTMENT TOPICAL
Qty: 30 G | Refills: 1 | Status: SHIPPED | OUTPATIENT
Start: 2025-07-31

## 2025-07-31 NOTE — PROGRESS NOTES
[facility]  Mckenna Marr MD  [phone]      Teresa Michel  1950    74 y.o. female     Date of Visit: 7/31/2025    Last seen: 1-2025 for FSE and 4-2024 for lesion    Chief Complaint: lesions/moles, f/u skin cancer  Chief Complaint   Patient presents with    Skin Exam     History of Present Illness:    1. F/u SCC - L shin s/p excision of SCC in this area 2-12-22.  Original path read as SCC - well-diff, KA-type.  Final excision read as no residual SCC within the excision.  Had probs with prolonged healing of wound after small dehiscence.    Saw wound care to assist with healing - finally healed.  Surrounding skin is firm with scar tissue but no pain, no new concerns otw.    Other NMSC.  - R medial [place] area - BCC many years ago.  No probs with this site and no other new concerns.    2. Here for evaluation of multiple asx pigmented lesions on the trunk and extremities, present for many years; no change in size/shape/color of any lesions; no bleeding lesions.    3. F/u AK - central upper chest - bx 3-2020.  No probs since.      4, 5. F/u for dermatitis - better from baseline.  Flaring on the torso recently but has cleared.    Mainly facial in the past.  flaring in the past on the FH and cheeks - she attributed to mask use.    Better recently though and has not needed much in terms of treatment.  Hands better.  Has triamcinolone to use at home as needed flares.   Patch testing as noted below.    6. f/u for a concerning persistent firm area on the R shin - had a round sore spot then seemed to improve/go away, noticed initially right before Xmas 2023.  Denies trauma.    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  Better since then - now asx.  No new lesions.    7. She has a concerning pigmented spot on the L thigh.  Asx.     Dermatitis Hx:  Changed cleansers after patch testing

## 2025-07-31 NOTE — PATIENT INSTRUCTIONS
Biopsy Wound Care Instructions    Keep the bandage in place for 24 hours.   Cleanse the wound with mild soapy water daily  Gently dry the area.  Apply Vaseline or petroleum jelly to the wound using a cotton tipped applicator.  Cover with a clean bandage.  Repeat this process until the biopsy site is healed.  If you had stitches placed, continue treating the site until the stitches are removed. Remember to make an appointment to return to have your stitches removed by our staff.  You may shower and bathe as usual.       ** Biopsy results generally take around 7 business days to come back.  If you have not heard from us by then, please call the office at [phone].    *Please note that biopsy results are released to both the patient and physician at the same time in [place].  Please allow time for your physician to review the results.  One of our staff members will reach out to you with the results and plan.

## 2025-08-05 LAB — DERMATOLOGY PATHOLOGY REPORT: NORMAL
